# Patient Record
Sex: MALE | Race: BLACK OR AFRICAN AMERICAN | NOT HISPANIC OR LATINO | URBAN - METROPOLITAN AREA
[De-identification: names, ages, dates, MRNs, and addresses within clinical notes are randomized per-mention and may not be internally consistent; named-entity substitution may affect disease eponyms.]

---

## 2017-05-26 ENCOUNTER — INPATIENT (INPATIENT)
Facility: HOSPITAL | Age: 27
LOS: 4 days | Discharge: HOME HEALTH | End: 2017-05-31
Attending: HOSPITALIST | Admitting: HOSPITALIST
Payer: MEDICARE

## 2017-05-26 VITALS
OXYGEN SATURATION: 84 % | SYSTOLIC BLOOD PRESSURE: 120 MMHG | RESPIRATION RATE: 14 BRPM | TEMPERATURE: 98 F | DIASTOLIC BLOOD PRESSURE: 84 MMHG | HEART RATE: 84 BPM

## 2017-05-26 DIAGNOSIS — E87.2 ACIDOSIS: ICD-10-CM

## 2017-05-26 DIAGNOSIS — R73.9 HYPERGLYCEMIA, UNSPECIFIED: ICD-10-CM

## 2017-05-26 DIAGNOSIS — E13.10 OTHER SPECIFIED DIABETES MELLITUS WITH KETOACIDOSIS WITHOUT COMA: ICD-10-CM

## 2017-05-26 DIAGNOSIS — F25.0 SCHIZOAFFECTIVE DISORDER, BIPOLAR TYPE: ICD-10-CM

## 2017-05-26 DIAGNOSIS — Z29.9 ENCOUNTER FOR PROPHYLACTIC MEASURES, UNSPECIFIED: ICD-10-CM

## 2017-05-26 DIAGNOSIS — F31.9 BIPOLAR DISORDER, UNSPECIFIED: ICD-10-CM

## 2017-05-26 LAB
ALBUMIN SERPL ELPH-MCNC: 4.1 G/DL — SIGNIFICANT CHANGE UP (ref 3.3–5)
ALP SERPL-CCNC: 70 U/L — SIGNIFICANT CHANGE UP (ref 40–120)
ALT FLD-CCNC: 12 U/L — SIGNIFICANT CHANGE UP (ref 4–41)
APPEARANCE UR: CLEAR — SIGNIFICANT CHANGE UP
AST SERPL-CCNC: 15 U/L — SIGNIFICANT CHANGE UP (ref 4–40)
B-OH-BUTYR SERPL-SCNC: 0.9 MMOL/L — HIGH (ref 0–0.4)
BASE EXCESS BLDV CALC-SCNC: 1.4 MMOL/L — SIGNIFICANT CHANGE UP
BASOPHILS # BLD AUTO: 0.02 K/UL — SIGNIFICANT CHANGE UP (ref 0–0.2)
BASOPHILS NFR BLD AUTO: 0.2 % — SIGNIFICANT CHANGE UP (ref 0–2)
BILIRUB SERPL-MCNC: 0.2 MG/DL — SIGNIFICANT CHANGE UP (ref 0.2–1.2)
BILIRUB UR-MCNC: NEGATIVE — SIGNIFICANT CHANGE UP
BLOOD GAS VENOUS - CREATININE: 0.72 MG/DL — SIGNIFICANT CHANGE UP (ref 0.5–1.3)
BLOOD UR QL VISUAL: NEGATIVE — SIGNIFICANT CHANGE UP
BUN SERPL-MCNC: 13 MG/DL — SIGNIFICANT CHANGE UP (ref 7–23)
CALCIUM SERPL-MCNC: 9.6 MG/DL — SIGNIFICANT CHANGE UP (ref 8.4–10.5)
CHLORIDE BLDV-SCNC: 97 MMOL/L — SIGNIFICANT CHANGE UP (ref 96–108)
CHLORIDE SERPL-SCNC: 91 MMOL/L — LOW (ref 98–107)
CO2 SERPL-SCNC: 21 MMOL/L — LOW (ref 22–31)
COLOR SPEC: SIGNIFICANT CHANGE UP
CREAT SERPL-MCNC: 0.95 MG/DL — SIGNIFICANT CHANGE UP (ref 0.5–1.3)
EOSINOPHIL # BLD AUTO: 0.03 K/UL — SIGNIFICANT CHANGE UP (ref 0–0.5)
EOSINOPHIL NFR BLD AUTO: 0.3 % — SIGNIFICANT CHANGE UP (ref 0–6)
GAS PNL BLDV: 130 MMOL/L — LOW (ref 136–146)
GLUCOSE BLDV-MCNC: 513 — CRITICAL HIGH (ref 70–99)
GLUCOSE SERPL-MCNC: 585 MG/DL — CRITICAL HIGH (ref 70–99)
GLUCOSE UR-MCNC: >1000 — SIGNIFICANT CHANGE UP
HCO3 BLDV-SCNC: 25 MMOL/L — SIGNIFICANT CHANGE UP (ref 20–27)
HCT VFR BLD CALC: 44.2 % — SIGNIFICANT CHANGE UP (ref 39–50)
HCT VFR BLDV CALC: 47.1 % — SIGNIFICANT CHANGE UP (ref 39–51)
HGB BLD-MCNC: 15.3 G/DL — SIGNIFICANT CHANGE UP (ref 13–17)
HGB BLDV-MCNC: 15.4 G/DL — SIGNIFICANT CHANGE UP (ref 13–17)
IMM GRANULOCYTES NFR BLD AUTO: 0.4 % — SIGNIFICANT CHANGE UP (ref 0–1.5)
KETONES UR-MCNC: SIGNIFICANT CHANGE UP
LACTATE BLDV-MCNC: 1.6 MMOL/L — SIGNIFICANT CHANGE UP (ref 0.5–2)
LEUKOCYTE ESTERASE UR-ACNC: NEGATIVE — SIGNIFICANT CHANGE UP
LYMPHOCYTES # BLD AUTO: 3.92 K/UL — HIGH (ref 1–3.3)
LYMPHOCYTES # BLD AUTO: 38.1 % — SIGNIFICANT CHANGE UP (ref 13–44)
MCHC RBC-ENTMCNC: 28.8 PG — SIGNIFICANT CHANGE UP (ref 27–34)
MCHC RBC-ENTMCNC: 34.6 % — SIGNIFICANT CHANGE UP (ref 32–36)
MCV RBC AUTO: 83.1 FL — SIGNIFICANT CHANGE UP (ref 80–100)
MONOCYTES # BLD AUTO: 0.74 K/UL — SIGNIFICANT CHANGE UP (ref 0–0.9)
MONOCYTES NFR BLD AUTO: 7.2 % — SIGNIFICANT CHANGE UP (ref 2–14)
NEUTROPHILS # BLD AUTO: 5.55 K/UL — SIGNIFICANT CHANGE UP (ref 1.8–7.4)
NEUTROPHILS NFR BLD AUTO: 53.8 % — SIGNIFICANT CHANGE UP (ref 43–77)
NITRITE UR-MCNC: NEGATIVE — SIGNIFICANT CHANGE UP
PCO2 BLDV: 43 MMHG — SIGNIFICANT CHANGE UP (ref 41–51)
PH BLDV: 7.4 PH — SIGNIFICANT CHANGE UP (ref 7.32–7.43)
PH UR: 6 — SIGNIFICANT CHANGE UP (ref 4.6–8)
PLATELET # BLD AUTO: 223 K/UL — SIGNIFICANT CHANGE UP (ref 150–400)
PMV BLD: 11.1 FL — SIGNIFICANT CHANGE UP (ref 7–13)
PO2 BLDV: 45 MMHG — HIGH (ref 35–40)
POTASSIUM BLDV-SCNC: 4.4 MMOL/L — SIGNIFICANT CHANGE UP (ref 3.4–4.5)
POTASSIUM SERPL-MCNC: 5 MMOL/L — SIGNIFICANT CHANGE UP (ref 3.5–5.3)
POTASSIUM SERPL-SCNC: 5 MMOL/L — SIGNIFICANT CHANGE UP (ref 3.5–5.3)
PROT SERPL-MCNC: 7.4 G/DL — SIGNIFICANT CHANGE UP (ref 6–8.3)
PROT UR-MCNC: NEGATIVE — SIGNIFICANT CHANGE UP
RBC # BLD: 5.32 M/UL — SIGNIFICANT CHANGE UP (ref 4.2–5.8)
RBC # FLD: 12.8 % — SIGNIFICANT CHANGE UP (ref 10.3–14.5)
RBC CASTS # UR COMP ASSIST: SIGNIFICANT CHANGE UP (ref 0–?)
SAO2 % BLDV: 80.2 % — SIGNIFICANT CHANGE UP (ref 60–85)
SODIUM SERPL-SCNC: 130 MMOL/L — LOW (ref 135–145)
SP GR SPEC: 1.03 — HIGH (ref 1–1.03)
UROBILINOGEN FLD QL: NORMAL E.U. — SIGNIFICANT CHANGE UP (ref 0.1–0.2)
WBC # BLD: 10.3 K/UL — SIGNIFICANT CHANGE UP (ref 3.8–10.5)
WBC # FLD AUTO: 10.3 K/UL — SIGNIFICANT CHANGE UP (ref 3.8–10.5)

## 2017-05-26 PROCEDURE — 99223 1ST HOSP IP/OBS HIGH 75: CPT | Mod: GC

## 2017-05-26 RX ORDER — PROPRANOLOL HCL 160 MG
40 CAPSULE, EXTENDED RELEASE 24HR ORAL
Qty: 0 | Refills: 0 | Status: DISCONTINUED | OUTPATIENT
Start: 2017-05-26 | End: 2017-05-31

## 2017-05-26 RX ORDER — SODIUM CHLORIDE 9 MG/ML
1000 INJECTION INTRAMUSCULAR; INTRAVENOUS; SUBCUTANEOUS
Qty: 0 | Refills: 0 | Status: DISCONTINUED | OUTPATIENT
Start: 2017-05-26 | End: 2017-05-27

## 2017-05-26 RX ORDER — DEXTROSE 50 % IN WATER 50 %
12.5 SYRINGE (ML) INTRAVENOUS ONCE
Qty: 0 | Refills: 0 | Status: DISCONTINUED | OUTPATIENT
Start: 2017-05-26 | End: 2017-05-31

## 2017-05-26 RX ORDER — DIVALPROEX SODIUM 500 MG/1
500 TABLET, DELAYED RELEASE ORAL
Qty: 0 | Refills: 0 | Status: DISCONTINUED | OUTPATIENT
Start: 2017-05-26 | End: 2017-05-31

## 2017-05-26 RX ORDER — DEXTROSE 50 % IN WATER 50 %
1 SYRINGE (ML) INTRAVENOUS ONCE
Qty: 0 | Refills: 0 | Status: DISCONTINUED | OUTPATIENT
Start: 2017-05-26 | End: 2017-05-31

## 2017-05-26 RX ORDER — INSULIN LISPRO 100/ML
VIAL (ML) SUBCUTANEOUS AT BEDTIME
Qty: 0 | Refills: 0 | Status: DISCONTINUED | OUTPATIENT
Start: 2017-05-26 | End: 2017-05-31

## 2017-05-26 RX ORDER — INSULIN LISPRO 100/ML
6 VIAL (ML) SUBCUTANEOUS ONCE
Qty: 0 | Refills: 0 | Status: COMPLETED | OUTPATIENT
Start: 2017-05-26 | End: 2017-05-26

## 2017-05-26 RX ORDER — DEXTROSE 50 % IN WATER 50 %
25 SYRINGE (ML) INTRAVENOUS ONCE
Qty: 0 | Refills: 0 | Status: DISCONTINUED | OUTPATIENT
Start: 2017-05-26 | End: 2017-05-31

## 2017-05-26 RX ORDER — INSULIN LISPRO 100/ML
VIAL (ML) SUBCUTANEOUS
Qty: 0 | Refills: 0 | Status: DISCONTINUED | OUTPATIENT
Start: 2017-05-26 | End: 2017-05-31

## 2017-05-26 RX ORDER — BENZTROPINE MESYLATE 1 MG
1 TABLET ORAL
Qty: 0 | Refills: 0 | Status: DISCONTINUED | OUTPATIENT
Start: 2017-05-26 | End: 2017-05-31

## 2017-05-26 RX ORDER — INSULIN GLARGINE 100 [IU]/ML
5 INJECTION, SOLUTION SUBCUTANEOUS ONCE
Qty: 0 | Refills: 0 | Status: COMPLETED | OUTPATIENT
Start: 2017-05-26 | End: 2017-05-26

## 2017-05-26 RX ORDER — SODIUM CHLORIDE 9 MG/ML
2000 INJECTION INTRAMUSCULAR; INTRAVENOUS; SUBCUTANEOUS ONCE
Qty: 0 | Refills: 0 | Status: COMPLETED | OUTPATIENT
Start: 2017-05-26 | End: 2017-05-26

## 2017-05-26 RX ORDER — SODIUM CHLORIDE 9 MG/ML
1000 INJECTION, SOLUTION INTRAVENOUS
Qty: 0 | Refills: 0 | Status: DISCONTINUED | OUTPATIENT
Start: 2017-05-26 | End: 2017-05-31

## 2017-05-26 RX ORDER — HALOPERIDOL DECANOATE 100 MG/ML
20 INJECTION INTRAMUSCULAR AT BEDTIME
Qty: 0 | Refills: 0 | Status: DISCONTINUED | OUTPATIENT
Start: 2017-05-26 | End: 2017-05-31

## 2017-05-26 RX ORDER — GLUCAGON INJECTION, SOLUTION 0.5 MG/.1ML
1 INJECTION, SOLUTION SUBCUTANEOUS ONCE
Qty: 0 | Refills: 0 | Status: DISCONTINUED | OUTPATIENT
Start: 2017-05-26 | End: 2017-05-31

## 2017-05-26 RX ADMIN — Medication 6 UNIT(S): at 20:14

## 2017-05-26 RX ADMIN — SODIUM CHLORIDE 2000 MILLILITER(S): 9 INJECTION INTRAMUSCULAR; INTRAVENOUS; SUBCUTANEOUS at 18:46

## 2017-05-26 NOTE — H&P ADULT - NSHPLABSRESULTS_GEN_ALL_CORE
15.3   1030 )-----------( 223      ( 26 May 2017 17:55 )             44.2           130<L>  |  91<L>  |  13  ----------------------------<  585<HH>  5.0   |  21<L>  |  0.95    Ca    9.6      26 May 2017 17:55    TPro  7.4  /  Alb  4.1  /  TBili  0.2  /  DBili  x   /  AST  15  /  ALT  12  /  AlkPhos  70                Urinalysis Basic - ( 26 May 2017 19:12 )    Color: COLORL / Appearance: CLEAR / S.034 / pH: 6.0  Gluc: >1000 / Ketone: MODERATE  / Bili: NEGATIVE / Urobili: NORMAL E.U.   Blood: NEGATIVE / Protein: NEGATIVE / Nitrite: NEGATIVE   Leuk Esterase: NEGATIVE / RBC: 0-2 / WBC x   Sq Epi: x / Non Sq Epi: x / Bacteria: x            Lactate Trend            CAPILLARY BLOOD GLUCOSE  290 (26 May 2017 21:46) 15.3   1030 )-----------( 223      ( 26 May 2017 17:55 )             44.2           130<L>  |  91<L>  |  13  ----------------------------<  585<HH>  5.0   |  21<L>  |  0.95    Ca    9.6      26 May 2017 17:55    TPro  7.4  /  Alb  4.1  /  TBili  0.2  /  DBili  x   /  AST  15  /  ALT  12  /  AlkPhos  70            Urinalysis Basic - ( 26 May 2017 19:12 )    Color: COLORL / Appearance: CLEAR / S.034 / pH: 6.0  Gluc: >1000 / Ketone: MODERATE  / Bili: NEGATIVE / Urobili: NORMAL E.U.   Blood: NEGATIVE / Protein: NEGATIVE / Nitrite: NEGATIVE   Leuk Esterase: NEGATIVE / RBC: 0-2 / WBC x   Sq Epi: x / Non Sq Epi: x / Bacteria: x      CAPILLARY BLOOD GLUCOSE  290 (26 May 2017 21:46)    EKG nsr, 70bpm, qtc 421, no ST changes, global Tw flattening  - my reading

## 2017-05-26 NOTE — H&P ADULT - HISTORY OF PRESENT ILLNESS
27M PMH scoliosis, DM, autism sent to ED by his PMD (Dr. Shania Ha) for elevated blood glucose in office (461 per note). Pt endorses polyuria and polydipsia but denies any other symptoms. Father at bedside notes pt drinks a lot of sugary sodas. Unsure what medications he takes. Father calling other family members to find out    In the ED, VS: T: 98.7, HR: 70s-90s, BP: 133-121/ 84-77, RR: 18, SpO2: 100% on RA. Labs on admission notable for Na of 132 (140 corrected for hyperglycemia), K+ of 5.0 (moderately hemolyzed), chloride of 91, bicarb of 21 (anion gap of 18), serum glucose of 585, and beta-hydroxybutyrate of 0.9. UA reveals moderate ketones, negative LE/nitrites, >1000 glucose. In the ED, pt recieved  humalog 6U SQ x 1 and 2L NS bolus x 1. Last 3 POC blood FS reads of: 464 --> 358 --> 290. 28yo M w/ pmhx of scoliosis, DM type II, bipolar type I disorder, schizo-affective schizophrenia, and autism spectrum disorder with mild mental retardation referred to the ER by his PCP for elevated blood glucose in the outpatient office during a routine office visit (FS of 461 as per note). Of note, collateral information obtained from pts father and sister given as he is able to provide only a limited history. Per sister, pt had been having increased thirst and urination over the past few days, but otherwise was in his normal state of health. Pt was diagnosed with DM several years ago and has been on metformin since. He does not routinely check his fingersticks at home. Pt's sister is responsible for administering his medications and reports compliance. Sister, however, notes that pt's father often takes him out to eat unhealthy, non-diabetes complaints foods from fast-food restaurants.  Pt at present has no complaints. Denies any vision changes, no headaches, no peripheral neuropathy, no abdominal pain, nausea, vomiting or diarrhea. Currently denies any increased thirst sensation or increased frequency of urination. No urinary symptoms or URI complaints. No recent illnesses preceding the event. No recent medication changes.     In the ED, VS: T: 98.7, HR: 70s-90s, BP: 133-121/ 84-77, RR: 18, SpO2: 100% on RA. Labs on admission notable for Na of 132 (140 corrected for hyperglycemia), K+ of 5.0 (moderately hemolyzed), chloride of 91, bicarb of 21 (anion gap of 18), serum glucose of 585, and beta-hydroxybutyrate of 0.9. UA reveals moderate ketones, negative LE/nitrites, >1000 glucose. In the ED, pt received  Humalog 6U SQ x 1 and 2L NS bolus x 1. Last 3 POC blood FS reads of: 464 --> 358 --> 290. 26yo Male, ambulatory without any assistive devices,w/ hx of scoliosis, DM type II, bipolar type I disorder, schizo-affective schizophrenia, and autism spectrum disorder with mild mental retardation referred to the ER by his PCP for elevated blood glucose in the outpatient office during a routine office visit (FS of 461 as per note). Of note, collateral information obtained from pts father and sister given as he is able to provide only a limited history. Per sister, pt had been having increased thirst and urination over the past few days, but otherwise was in his normal state of health. Pt was diagnosed with DM several years ago and has been on metformin since. He does not routinely check his fingersticks at home. Pt's sister is responsible for administering his medications and reports compliance. Sister, however, notes that pt's father often takes him out to eat unhealthy, non-diabetes complaints foods from fast-food restaurants.  Pt at present has no complaints. Denies any vision changes, no headaches, no peripheral neuropathy, no abdominal pain, nausea, vomiting or diarrhea. Currently denies any increased thirst sensation or increased frequency of urination. No urinary symptoms or URI complaints. No recent illnesses preceding the event. No recent medication changes.     In the ED, VS: T: 98.7, HR: 70s-90s, BP: 133-121/ 84-77, RR: 18, SpO2: 100% on RA. Labs on admission notable for Na of 132 (140 corrected for hyperglycemia), K+ of 5.0 (moderately hemolyzed), chloride of 91, bicarb of 21 (anion gap of 18), serum glucose of 585, and beta-hydroxybutyrate of 0.9. UA reveals moderate ketones, negative LE/nitrites, >1000 glucose. In the ED, pt received  Humalog 6U SQ x 1 and 2L NS bolus x 1. Last 3 POC blood FS reads of: 464 --> 358 --> 290.

## 2017-05-26 NOTE — H&P ADULT - PROBLEM SELECTOR PLAN 3
- c/w home meds as prescribed - c/w home meds as prescribed  - screening EKG r/o QTc prolongation EKg c/w global Tw flattening;   Likely due to dka/hhs  Repeat EKG once metabolic disarrangement resolved, if persisting would consider Cardiology evaluation

## 2017-05-26 NOTE — ED PROVIDER NOTE - ATTENDING CONTRIBUTION TO CARE
att28 y/o m with h/o DM, noncompliant with medications, MR, autism, schizoaffective, presents for hyperglycemia, sent in by PCP. + polyuria/polydipsia. No fever, no cough, no vomiting, no diarrhea, no abd pain. No resp distress. Exam as above. NTTP abd. Plan for labs, r/u dka, IVF, insulin, ua, r/u infection and admission since pt has cognitive impairment and noncomplaint. Mild elevation of bhb, will admit.   I have personally performed a face to face bedside history and physical examination of this patient. I have discussed the history, examination, review of systems, assessment and plan of management with the resident. I have reviewed the electronic medical record and amended it to reflect my history, review of systems, physical exam, assessment and plan.

## 2017-05-26 NOTE — ED ADULT NURSE REASSESSMENT NOTE - NS ED NURSE REASSESS COMMENT FT1
pt AO x1-2, ambulatory, transfers to [facility] in stable condition. report given MYRANDA Song.

## 2017-05-26 NOTE — H&P ADULT - LYMPHATIC
posterior cervical L/posterior cervical R/anterior cervical R/supraclavicular R/anterior cervical L/supraclavicular L

## 2017-05-26 NOTE — H&P ADULT - NSHPREVIEWOFSYSTEMS_GEN_ALL_CORE
REVIEW OF SYSTEMS:    CONSTITUTIONAL: No weakness, fevers or chills  EYES/ENT: No visual changes;  No dysphagia  NECK: No pain or stiffness  RESPIRATORY: No cough, wheezing, hemoptysis; No shortness of breath  CARDIOVASCULAR: No chest pain or palpitations; No lower extremity edema  GASTROINTESTINAL: No abdominal or epigastric pain. No nausea, vomiting, or hematemesis; No diarrhea or constipation. No melena or hematochezia.  GENITOURINARY: No dysuria, frequency or hematuria  NEUROLOGICAL: No numbness or weakness  SKIN: No itching, burning, rashes, or lesions   All other review of systems is negative unless indicated above. CONSTITUTIONAL: No weakness, fevers or chills  EYES/ENT: No visual changes;  No dysphagia  NECK: No pain or stiffness  RESPIRATORY: No cough, wheezing, hemoptysis; No shortness of breath  CARDIOVASCULAR: No chest pain or palpitations; No lower extremity edema  GASTROINTESTINAL: No abdominal or epigastric pain. No nausea, vomiting, or hematemesis; No diarrhea or constipation. No melena or hematochezia.  GENITOURINARY: No dysuria, frequency or hematuria  NEUROLOGICAL: No numbness or weakness  SKIN: No itching, burning, rashes, or lesions   All other review of systems is negative unless indicated above.

## 2017-05-26 NOTE — H&P ADULT - PROBLEM SELECTOR PLAN 2
A/w metabolic acidosis with AG (18) on admission with present of ketonuria and beta-hydroxybutyrate on labs  - trend BMP q4 hrs to ensure gap closing  - administer insulin (moderage ISS coverage and lantus 10U x 1 now)  - IVF hydration   - if gap remains open, will need MICU consult for insulin gtt A/w mild metabolic acidosis with AG (18) on admission with present of ketonuria and beta-hydroxybutyrate on labs  - trend BMP q4 hrs to ensure gap closing  - administer insulin (moderage ISS coverage and lantus 10U x 1 now)  - IVF hydration   - if gap remains open, will need MICU consult for insulin gtt

## 2017-05-26 NOTE — H&P ADULT - PROBLEM SELECTOR PLAN 1
Improved FS s/p Humalog 6U and 2L NS bolus (>500 -->  464 --> 358 --> 290). Likely in setting of inadequate diabetes medication as well as nonadherence to diabetic diet.   - would place pt on moderate ISS coverage for now  - will administer Lantus 10U this evening for long acting coverage  - restart pt on basal / bolus insulin regimen tmw based on 24 hr requirements  - check HgbA1c in a.m.   - trend BMP q6h and betahydroxybutyrate to ensure gap closing   - c/w aggressive IVF hydration (NS @ 125cc/hr for 10 hours) Mixed picture of mild DKA with possible hyperosmolar hyperglycemic state.  Improved FS s/p Humalog 6U and 2L NS bolus (>500 -->  464 --> 358 --> 290). Likely in setting of inadequate diabetes medication as well as nonadherence to diabetic diet.   - would place pt on moderate ISS coverage for now  - will administer Lantus 10U this evening for long acting coverage  - restart pt on basal / bolus insulin regimen tmw based on 24 hr requirements  - check HgbA1c in a.m.   - trend BMP q6h and betahydroxybutyrate to ensure gap closing   - c/w aggressive IVF hydration (NS @ 125cc/hr for 10 hours). Mixed picture of mild DKA hyperosmolar hyperglycemic state.  Improved FS s/p Humalog 6U and 2L NS bolus (>500 -->  464 --> 358 --> 290). Likely in setting of inadequate diabetes medication as well as nonadherence to diabetic diet.   - would place pt on moderate ISS coverage for now  - will administer Lantus 5U this evening for long acting coverage (naive to insulin)  - restart pt on basal / bolus insulin regimen tmw based on 24 hr requirements  - check HgbA1c in a.m.   - trend BMP q6h and betahydroxybutyrate to ensure gap closing   - c/w aggressive IVF hydration (NS @ 125cc/hr)

## 2017-05-26 NOTE — ED PROVIDER NOTE - CARE PLAN
Principal Discharge DX:	Hyperglycemia Principal Discharge DX:	Uncontrolled type 2 diabetes mellitus with ketoacidosis without coma, without long-term current use of insulin  Secondary Diagnosis:	Mental retardation

## 2017-05-26 NOTE — ED PROVIDER NOTE - OBJECTIVE STATEMENT
27M PMH scoliosis, DM, autism sent to ED by his PMD (Dr. Shania Ha) for elevated blood glucose in office (461 per note). Pt endorses polyuria and polydipsia but denies any other symptoms. Father at bedside notes pt drinks a lot of sugary sodas. Unsure what medications he takes. Father calling other family members to find out.

## 2017-05-26 NOTE — ED ADULT NURSE NOTE - OBJECTIVE STATEMENT
Pt AO x1-2, ambulatory, was sent by his PMD for elevated blood glucose levels. At triage blood glucose 464mg/dl. Denies any pain or discomfort by now. Pending evaluation by provider. Blood obtained and sent. IV inserted. Right Ac 20G. Awaiting further study. Will continue to monitor.

## 2017-05-26 NOTE — H&P ADULT - NSHPSOCIALHISTORY_GEN_ALL_CORE
Pt has mild mental retardation and autism. He lives with his parents and sister, who help him with ADLs and medication administration. Pt ambulates unassisted without difficulty. He is unemployed. He denies any h/o cigarettes smoking, no EtOH or illicit drug use.

## 2017-05-26 NOTE — ED PROVIDER NOTE - PRINCIPAL DIAGNOSIS
Hyperglycemia Uncontrolled type 2 diabetes mellitus with ketoacidosis without coma, without long-term current use of insulin

## 2017-05-26 NOTE — H&P ADULT - NSHPPHYSICALEXAM_GEN_ALL_CORE
GENERAL: No acute distress, well-developed  HEAD:  Atraumatic, Normocephalic  ENT: EOMI, PERRLA, conjunctiva and sclera clear, Neck supple, No JVD, moist mucosa, no pharynageal erythema, no tonsillar enlargement or exudate  CHEST/LUNG: Clear to auscultation bilaterally; No wheeze, equal breath sounds bilaterally   HEART: Regular rate and rhythm; No murmurs, rubs, or gallops  ABDOMEN: Soft, Nontender, Nondistended; Bowel sounds present, no organomegaly  EXTREMITIES:  No clubbing, cyanosis, or edema  PSYCH: Nl behavior, nl affect  NEUROLOGY: AAOx3, non-focal, cranial nerves intact  SKIN: Normal color, No rashes or lesions GENERAL: No acute distress, well-developed, non-toxic appearing  HEAD:  Atraumatic, Normocephalic  ENT: EOMI, PERRLA, conjunctiva and sclera clear, Neck supple, No JVD, moist mucosa, no pharynageal erythema, no tonsillar enlargement or exudate  CHEST/LUNG: Clear to auscultation bilaterally; No wheeze, equal breath sounds bilaterally   HEART: Regular rate and rhythm; No murmurs, rubs, or gallops  ABDOMEN: Soft, Nontender, Nondistended; Bowel sounds present, no organomegaly  EXTREMITIES:  No clubbing, cyanosis, or edema  PSYCH: Nl behavior, nl affect  NEUROLOGY: AAOx3, non-focal, cranial nerves intact  SKIN: Normal color, No rashes or lesions

## 2017-05-26 NOTE — H&P ADULT - PROBLEM SELECTOR PLAN 4
- c/w home meds as prescribed - c/w home meds as prescribed  - Transfer from 5N to 6N initiated - c/w home meds as prescribed  - screening EKG, QTc wnl

## 2017-05-26 NOTE — H&P ADULT - PROBLEM SELECTOR PLAN 5
DVT ppx: low risk, ambulatory, no need for DVT ppx - c/w home meds as prescribed  - Transfer from 5N to 6N initiated

## 2017-05-26 NOTE — H&P ADULT - ASSESSMENT
26yo M w/ pmhx of scoliosis, DM type II, bipolar type I disorder, schizo-affective schizophrenia, and autism spectrum disorder with mild mental retardation referred by PCP for symptomatic hyperglycemia admitted with blood glucose of 585 complicated by anion gap metabolic acidosis. Blood sugar now improved s/p IV fluid hydration and insulin administration. 26yo Male w/ hx of scoliosis, DM type II, bipolar type I disorder, schizo-affective schizophrenia, and autism spectrum disorder with mild mental retardation a/w mild dka/HHS picture in the setting of ketonuria glycosuria; 28yo Male w/ hx of scoliosis, DM type II, bipolar type I disorder, schizo-affective schizophrenia, and autism spectrum disorder with mild mental retardation a/w mild dka/HHS picture in the setting of ketonuria glycosuria; Abnormal EKG c/w global Tw flattening;

## 2017-05-27 DIAGNOSIS — E66.9 OBESITY, UNSPECIFIED: ICD-10-CM

## 2017-05-27 DIAGNOSIS — F84.0 AUTISTIC DISORDER: ICD-10-CM

## 2017-05-27 DIAGNOSIS — E13.10 OTHER SPECIFIED DIABETES MELLITUS WITH KETOACIDOSIS WITHOUT COMA: ICD-10-CM

## 2017-05-27 DIAGNOSIS — R94.31 ABNORMAL ELECTROCARDIOGRAM [ECG] [EKG]: ICD-10-CM

## 2017-05-27 LAB
BASE EXCESS BLDV CALC-SCNC: 3.3 MMOL/L — SIGNIFICANT CHANGE UP
BLOOD GAS VENOUS - CREATININE: 0.73 MG/DL — SIGNIFICANT CHANGE UP (ref 0.5–1.3)
BUN SERPL-MCNC: 10 MG/DL — SIGNIFICANT CHANGE UP (ref 7–23)
BUN SERPL-MCNC: 5 MG/DL — LOW (ref 7–23)
CALCIUM SERPL-MCNC: 8.5 MG/DL — SIGNIFICANT CHANGE UP (ref 8.4–10.5)
CALCIUM SERPL-MCNC: 9.8 MG/DL — SIGNIFICANT CHANGE UP (ref 8.4–10.5)
CHLORIDE BLDV-SCNC: 101 MMOL/L — SIGNIFICANT CHANGE UP (ref 96–108)
CHLORIDE SERPL-SCNC: 94 MMOL/L — LOW (ref 98–107)
CHLORIDE SERPL-SCNC: 97 MMOL/L — LOW (ref 98–107)
CO2 SERPL-SCNC: 24 MMOL/L — SIGNIFICANT CHANGE UP (ref 22–31)
CO2 SERPL-SCNC: 26 MMOL/L — SIGNIFICANT CHANGE UP (ref 22–31)
CREAT SERPL-MCNC: 0.72 MG/DL — SIGNIFICANT CHANGE UP (ref 0.5–1.3)
CREAT SERPL-MCNC: 0.95 MG/DL — SIGNIFICANT CHANGE UP (ref 0.5–1.3)
GAS PNL BLDV: 133 MMOL/L — LOW (ref 136–146)
GLUCOSE BLDV-MCNC: 368 — HIGH (ref 70–99)
GLUCOSE SERPL-MCNC: 300 MG/DL — HIGH (ref 70–99)
GLUCOSE SERPL-MCNC: 363 MG/DL — HIGH (ref 70–99)
HBA1C BLD-MCNC: 16.7 % — HIGH (ref 4–5.6)
HCO3 BLDV-SCNC: 24 MMOL/L — SIGNIFICANT CHANGE UP (ref 20–27)
HCT VFR BLD CALC: 40.2 % — SIGNIFICANT CHANGE UP (ref 39–50)
HCT VFR BLDV CALC: 51.9 % — HIGH (ref 39–51)
HGB BLD-MCNC: 13.6 G/DL — SIGNIFICANT CHANGE UP (ref 13–17)
HGB BLDV-MCNC: 17 G/DL — SIGNIFICANT CHANGE UP (ref 13–17)
LACTATE BLDV-MCNC: 1.3 MMOL/L — SIGNIFICANT CHANGE UP (ref 0.5–2)
MAGNESIUM SERPL-MCNC: 1.6 MG/DL — SIGNIFICANT CHANGE UP (ref 1.6–2.6)
MAGNESIUM SERPL-MCNC: 1.9 MG/DL — SIGNIFICANT CHANGE UP (ref 1.6–2.6)
MCHC RBC-ENTMCNC: 28.3 PG — SIGNIFICANT CHANGE UP (ref 27–34)
MCHC RBC-ENTMCNC: 33.8 % — SIGNIFICANT CHANGE UP (ref 32–36)
MCV RBC AUTO: 83.6 FL — SIGNIFICANT CHANGE UP (ref 80–100)
PCO2 BLDV: 55 MMHG — HIGH (ref 41–51)
PH BLDV: 7.34 PH — SIGNIFICANT CHANGE UP (ref 7.32–7.43)
PHOSPHATE SERPL-MCNC: 3.1 MG/DL — SIGNIFICANT CHANGE UP (ref 2.5–4.5)
PHOSPHATE SERPL-MCNC: 3.8 MG/DL — SIGNIFICANT CHANGE UP (ref 2.5–4.5)
PLATELET # BLD AUTO: 187 K/UL — SIGNIFICANT CHANGE UP (ref 150–400)
PMV BLD: 10.8 FL — SIGNIFICANT CHANGE UP (ref 7–13)
PO2 BLDV: < 24 MMHG — LOW (ref 35–40)
POTASSIUM BLDV-SCNC: 4.5 MMOL/L — SIGNIFICANT CHANGE UP (ref 3.4–4.5)
POTASSIUM SERPL-MCNC: 4 MMOL/L — SIGNIFICANT CHANGE UP (ref 3.5–5.3)
POTASSIUM SERPL-MCNC: 5.2 MMOL/L — SIGNIFICANT CHANGE UP (ref 3.5–5.3)
POTASSIUM SERPL-SCNC: 4 MMOL/L — SIGNIFICANT CHANGE UP (ref 3.5–5.3)
POTASSIUM SERPL-SCNC: 5.2 MMOL/L — SIGNIFICANT CHANGE UP (ref 3.5–5.3)
RBC # BLD: 4.81 M/UL — SIGNIFICANT CHANGE UP (ref 4.2–5.8)
RBC # FLD: 12.6 % — SIGNIFICANT CHANGE UP (ref 10.3–14.5)
SAO2 % BLDV: 16.5 % — LOW (ref 60–85)
SODIUM SERPL-SCNC: 135 MMOL/L — SIGNIFICANT CHANGE UP (ref 135–145)
SODIUM SERPL-SCNC: 135 MMOL/L — SIGNIFICANT CHANGE UP (ref 135–145)
VALPROATE SERPL-MCNC: 69.7 UG/ML — SIGNIFICANT CHANGE UP (ref 50–100)
WBC # BLD: 9.34 K/UL — SIGNIFICANT CHANGE UP (ref 3.8–10.5)
WBC # FLD AUTO: 9.34 K/UL — SIGNIFICANT CHANGE UP (ref 3.8–10.5)

## 2017-05-27 PROCEDURE — 99222 1ST HOSP IP/OBS MODERATE 55: CPT

## 2017-05-27 PROCEDURE — 93010 ELECTROCARDIOGRAM REPORT: CPT

## 2017-05-27 PROCEDURE — 99233 SBSQ HOSP IP/OBS HIGH 50: CPT

## 2017-05-27 RX ORDER — INSULIN LISPRO 100/ML
6 VIAL (ML) SUBCUTANEOUS
Qty: 0 | Refills: 0 | Status: DISCONTINUED | OUTPATIENT
Start: 2017-05-27 | End: 2017-05-29

## 2017-05-27 RX ORDER — SODIUM CHLORIDE 9 MG/ML
1000 INJECTION INTRAMUSCULAR; INTRAVENOUS; SUBCUTANEOUS
Qty: 0 | Refills: 0 | Status: DISCONTINUED | OUTPATIENT
Start: 2017-05-27 | End: 2017-05-27

## 2017-05-27 RX ORDER — INSULIN GLARGINE 100 [IU]/ML
7 INJECTION, SOLUTION SUBCUTANEOUS ONCE
Qty: 0 | Refills: 0 | Status: COMPLETED | OUTPATIENT
Start: 2017-05-27 | End: 2017-05-27

## 2017-05-27 RX ORDER — INSULIN LISPRO 100/ML
4 VIAL (ML) SUBCUTANEOUS ONCE
Qty: 0 | Refills: 0 | Status: COMPLETED | OUTPATIENT
Start: 2017-05-27 | End: 2017-05-27

## 2017-05-27 RX ORDER — SODIUM CHLORIDE 9 MG/ML
1000 INJECTION INTRAMUSCULAR; INTRAVENOUS; SUBCUTANEOUS
Qty: 0 | Refills: 0 | Status: DISCONTINUED | OUTPATIENT
Start: 2017-05-27 | End: 2017-05-30

## 2017-05-27 RX ORDER — INSULIN LISPRO 100/ML
5 VIAL (ML) SUBCUTANEOUS
Qty: 0 | Refills: 0 | Status: DISCONTINUED | OUTPATIENT
Start: 2017-05-27 | End: 2017-05-27

## 2017-05-27 RX ORDER — INSULIN GLARGINE 100 [IU]/ML
16 INJECTION, SOLUTION SUBCUTANEOUS AT BEDTIME
Qty: 0 | Refills: 0 | Status: DISCONTINUED | OUTPATIENT
Start: 2017-05-27 | End: 2017-05-27

## 2017-05-27 RX ORDER — INSULIN LISPRO 100/ML
4 VIAL (ML) SUBCUTANEOUS
Qty: 0 | Refills: 0 | Status: DISCONTINUED | OUTPATIENT
Start: 2017-05-27 | End: 2017-05-27

## 2017-05-27 RX ORDER — INSULIN GLARGINE 100 [IU]/ML
16 INJECTION, SOLUTION SUBCUTANEOUS EVERY MORNING
Qty: 0 | Refills: 0 | Status: DISCONTINUED | OUTPATIENT
Start: 2017-05-28 | End: 2017-05-29

## 2017-05-27 RX ORDER — INSULIN GLARGINE 100 [IU]/ML
12 INJECTION, SOLUTION SUBCUTANEOUS AT BEDTIME
Qty: 0 | Refills: 0 | Status: DISCONTINUED | OUTPATIENT
Start: 2017-05-27 | End: 2017-05-27

## 2017-05-27 RX ADMIN — Medication: at 22:21

## 2017-05-27 RX ADMIN — HALOPERIDOL DECANOATE 20 MILLIGRAM(S): 100 INJECTION INTRAMUSCULAR at 21:37

## 2017-05-27 RX ADMIN — Medication 4 UNIT(S): at 09:21

## 2017-05-27 RX ADMIN — HALOPERIDOL DECANOATE 20 MILLIGRAM(S): 100 INJECTION INTRAMUSCULAR at 01:10

## 2017-05-27 RX ADMIN — Medication 4 UNIT(S): at 01:43

## 2017-05-27 RX ADMIN — SODIUM CHLORIDE 125 MILLILITER(S): 9 INJECTION INTRAMUSCULAR; INTRAVENOUS; SUBCUTANEOUS at 19:58

## 2017-05-27 RX ADMIN — Medication 8: at 11:34

## 2017-05-27 RX ADMIN — INSULIN GLARGINE 7 UNIT(S): 100 INJECTION, SOLUTION SUBCUTANEOUS at 04:58

## 2017-05-27 RX ADMIN — SODIUM CHLORIDE 125 MILLILITER(S): 9 INJECTION INTRAMUSCULAR; INTRAVENOUS; SUBCUTANEOUS at 01:40

## 2017-05-27 RX ADMIN — INSULIN GLARGINE 5 UNIT(S): 100 INJECTION, SOLUTION SUBCUTANEOUS at 01:10

## 2017-05-27 RX ADMIN — Medication 4: at 09:21

## 2017-05-27 RX ADMIN — SODIUM CHLORIDE 125 MILLILITER(S): 9 INJECTION INTRAMUSCULAR; INTRAVENOUS; SUBCUTANEOUS at 12:10

## 2017-05-27 RX ADMIN — Medication 1 MILLIGRAM(S): at 21:38

## 2017-05-27 RX ADMIN — Medication 2 MILLIGRAM(S): at 18:19

## 2017-05-27 RX ADMIN — DIVALPROEX SODIUM 500 MILLIGRAM(S): 500 TABLET, DELAYED RELEASE ORAL at 05:06

## 2017-05-27 RX ADMIN — Medication 1 MILLIGRAM(S): at 05:06

## 2017-05-27 RX ADMIN — Medication 40 MILLIGRAM(S): at 21:38

## 2017-05-27 RX ADMIN — DIVALPROEX SODIUM 500 MILLIGRAM(S): 500 TABLET, DELAYED RELEASE ORAL at 21:37

## 2017-05-27 RX ADMIN — Medication 5 UNIT(S): at 11:35

## 2017-05-27 NOTE — CONSULT NOTE ADULT - PROBLEM SELECTOR RECOMMENDATION 9
Discussed blood glucose goals and Hba1c target. Given Hba1c of 16.7%, will start basal bolus regimen. Patient will need diabetic teaching and nutrition consult. Will start Lantus 16 units QHS and Humalog 6 units QAC with moderate correction scale. Will follow closely and will need outpatient follow up. For discharge, will need insulin regimen if basal bolus regimen is too complicated may consider Novolog 70/30 mix twice daily regimen along with Metformin.  As outpatient, will need opthalmology evaluation and urine microalb/cr ratio. Recommend checking fasting lipid panel. Discussed carbohydrate consistent diet and exercise.

## 2017-05-27 NOTE — CHART NOTE - NSCHARTNOTEFT_GEN_A_CORE
Endocrine consult called for uncontrolled DM,  Hgb A1C 16.7.  As per verbal recommendations, pre-meal Humalog changed to 5units, Lantus dose to be determined.  Will follow up further recommendations.  Continue Consistent Carb Diet.  Will Continue to monitor.    --Maryanne Aldridge, FNP [id number]

## 2017-05-27 NOTE — CONSULT NOTE ADULT - SUBJECTIVE AND OBJECTIVE BOX
HPI:  26yo Male, ambulatory without any assistive devices,w/ hx of scoliosis, Type 2 DM, bipolar type I disorder, schizo-affective schizophrenia, and autism spectrum disorder with mild mental retardation referred to the ER by his PCP for elevated blood glucose in the outpatient office during a routine office visit (FS of 461 as per note). Of note, collateral information obtained from pts father and sister given as he is able to provide only a limited history. Per sister, pt had been having increased thirst and urination over the past few days, but otherwise was in his normal state of health. Pt was diagnosed with Type 2 DM several years ago and has been on Metformin since. He does not routinely check his fingersticks at home. Pt's sister is responsible for administering his medications and reports compliance. Sister, however, notes that pt's father often takes him out to eat unhealthy, non-diabetes complaints foods from fast-food restaurants.  Pt at present has no complaints. Denies any vision changes, no headaches, no peripheral neuropathy, no abdominal pain, vomiting or diarrhea. Currently denies any increased thirst sensation or increased frequency of urination. No urinary symptoms or URI complaints. No recent illnesses preceding the event. No recent medication changes.     In the ED, VS: T: 98.7, HR: 70s-90s, BP: 133-121/ 84-77, RR: 18, SpO2: 100% on RA. Labs on admission notable for Na of 132 (140 corrected for hyperglycemia), K+ of 5.0 (moderately hemolyzed), chloride of 91, bicarb of 21 (anion gap of 18), serum glucose of 585, and beta-hydroxybutyrate of 0.9. UA reveals moderate ketones, negative LE/nitrites, >1000 glucose. In the ED, pt received  Humalog 6U SQ x 1 and 2L NS bolus x 1. Last 3 POC blood FS reads of: 464 --> 358 --> 290. (26 May 2017 22:12)    In regards to Type 2DM, not certain when diagnosed. Noncompliant with diet and drinks regular soda and likes Chinese food. No exercise. At presents reports nausea but tolerating p.o. intake. No SOB or CP. Less polyuria and polydipsia now. Past due optho and not aware of retinopathy. No foot complaints including numbness or tingling.      PAST MEDICAL & SURGICAL HISTORY:  Schizo-affective schizophrenia  Bipolar 1 disorder  Obesity  Scoliosis  Mental retardation  No significant past surgical history      FAMILY HISTORY:  Father Type 2 DM      Social History: No smoking and no alcohol use    Outpatient Medications:  Metformin    MEDICATIONS  (STANDING):  propranolol 40milliGRAM(s) Oral two times a day  diVALproex DR 500milliGRAM(s) Oral two times a day  benztropine 1milliGRAM(s) Oral two times a day  haloperidol     Tablet 20milliGRAM(s) Oral at bedtime  insulin lispro (HumaLOG) corrective regimen sliding scale  SubCutaneous three times a day before meals  insulin lispro (HumaLOG) corrective regimen sliding scale  SubCutaneous at bedtime  dextrose 5%. 1000milliLiter(s) IV Continuous <Continuous>  dextrose 50% Injectable 12.5Gram(s) IV Push once  dextrose 50% Injectable 25Gram(s) IV Push once  dextrose 50% Injectable 25Gram(s) IV Push once  insulin lispro Injectable (HumaLOG) 5Unit(s) SubCutaneous three times a day with meals  sodium chloride 0.9%. 1000milliLiter(s) IV Continuous <Continuous>    MEDICATIONS  (PRN):  dextrose Gel 1Dose(s) Oral once PRN Blood Glucose LESS THAN 70 milliGRAM(s)/deciLiter  glucagon  Injectable 1milliGRAM(s) IntraMuscular once PRN Glucose <70 milliGRAM(s)/deciLiter      Allergies    No Known Allergies    Intolerances      Review of Systems:  Constitutional: No fever  Eyes: No blurry vision  Neuro: No tremors  HEENT: No pain  Cardiovascular: No chest pain, palpitations  Respiratory: No SOB, no cough  GI: Mild nausea and no vomiting, no abdominal pain  : No dysuria  Skin: no rash  Psych: no depression  Endocrine: no polyuria, polydipsia  Hem/lymph: no swelling      ALL OTHER SYSTEMS REVIEWED AND NEGATIVE        PHYSICAL EXAM:  VITALS: T(C): 36.6  T(F): 97.8, Max: 98.8 (05-26 @ 23:02)  HR: 76 (66 - 98)  BP: 112/70 (103/78 - 133/84)  RR:  (14 - 18)  SpO2:  (84% - 100%)  Wt(kg): --  GENERAL: NAD, well-groomed, well-developed  EYES: No proptosis, no lid lag, anicteric  HEENT:  Atraumatic, Normocephalic, moist mucous membranes  THYROID: Normal size, no palpable nodules  RESPIRATORY: Clear to auscultation bilaterally; No rales, rhonchi, wheezing  CARDIOVASCULAR: Regular rate and rhythm; No murmurs; no peripheral edema  GI: Soft, nontender, non distended, normal bowel sounds  SKIN: Dry, intact, No rashes or lesions  MUSCULOSKELETAL: Full range of motion, normal strength  NEURO: sensation intact, extraocular movements intact, no tremor  PSYCH: Alert and oriented x 3, normal mood  CUSHING'S SIGNS: no striae    CAPILLARY BLOOD GLUCOSE  303 (05-27 @ 11:18)  244 (05-27 @ 09:06)  223 (05-27 @ 08:38)  238 (05-27 @ 04:47)  300 (05-27 @ 00:55)  243 (05-26 @ 23:02)  290 (05-26 @ 21:46)  358 (05-26 @ 20:14)  464 (05-26 @ 17:14)                            13.6   9.34  )-----------( 187      ( 27 May 2017 10:20 )             40.2       05-27    135  |  97<L>  |  10  ----------------------------<  300<H>  4.0   |  24  |  0.72    EGFR if : 149  EGFR if non : 129    Ca    8.5      05-27  Mg     1.6     05-27  Phos  3.1     05-27    TPro  7.4  /  Alb  4.1  /  TBili  0.2  /  DBili  x   /  AST  15  /  ALT  12  /  AlkPhos  70  05-26      Thyroid Function Tests:      Hemoglobin A1C, Whole Blood: 16.7 % <H> [4.0 - 5.6] (05-27 @ 01:30)          Radiology:

## 2017-05-27 NOTE — PROGRESS NOTE ADULT - SUBJECTIVE AND OBJECTIVE BOX
Patient is a 27y old  Male who presents with mild dka due to uncontrolled DM      SUBJECTIVE / OVERNIGHT EVENTS:    MEDICATIONS  (STANDING):  propranolol 40milliGRAM(s) Oral two times a day  diVALproex DR 500milliGRAM(s) Oral two times a day  benztropine 1milliGRAM(s) Oral two times a day  haloperidol     Tablet 20milliGRAM(s) Oral at bedtime  insulin lispro (HumaLOG) corrective regimen sliding scale  SubCutaneous three times a day before meals  insulin lispro (HumaLOG) corrective regimen sliding scale  SubCutaneous at bedtime  dextrose 5%. 1000milliLiter(s) IV Continuous <Continuous>  dextrose 50% Injectable 12.5Gram(s) IV Push once  dextrose 50% Injectable 25Gram(s) IV Push once  dextrose 50% Injectable 25Gram(s) IV Push once  sodium chloride 0.9%. 1000milliLiter(s) IV Continuous <Continuous>  insulin lispro Injectable (HumaLOG) 4Unit(s) SubCutaneous three times a day with meals    MEDICATIONS  (PRN):  dextrose Gel 1Dose(s) Oral once PRN Blood Glucose LESS THAN 70 milliGRAM(s)/deciLiter  glucagon  Injectable 1milliGRAM(s) IntraMuscular once PRN Glucose <70 milliGRAM(s)/deciLiter      Vital Signs Last 24 Hrs  T(C): 36.7, Max: 37.1 ( @ 20:23)  HR: 66 (66 - 98)  BP: 111/73 (103/78 - 133/84)  RR: 18 (14 - 18)  SpO2: 99% (84% - 100%)  Wt(kg): --  CAPILLARY BLOOD GLUCOSE  244 (27 May 2017 09:06)  223 (27 May 2017 08:38)  238 (27 May 2017 04:47)  300 (27 May 2017 00:55)  243 (26 May 2017 23:02)  290 (26 May 2017 21:46)  358 (26 May 2017 20:14)  464 (26 May 2017 17:14)          PHYSICAL EXAM:  GENERAL: NAD, well-developed  HEAD:  Atraumatic, Normocephalic  EYES: EOMI, PERRLA, conjunctiva and sclera clear  CHEST/LUNG: Clear to auscultation bilaterally; No wheeze  HEART: Regular rate and rhythm; No murmurs, rubs, or gallops  ABDOMEN: Soft, Nontender, Nondistended; Bowel sounds present  EXTREMITIES:  2+ Peripheral Pulses, No clubbing, cyanosis, or edema  PSYCH: calm, coop  NEUROLOGY: non-focal      LABS:                        15.3   10.30 )-----------( 223      ( 26 May 2017 17:55 )             44.2         135  |  94<L>  |  5<L>  ----------------------------<  363<H>  5.2   |  26  |  0.95    Ca    9.8      27 May 2017 01:30  Phos  3.8       Mg     1.9         TPro  7.4  /  Alb  4.1  /  TBili  0.2  /  DBili  x   /  AST  15  /  ALT  12  /  AlkPhos  70            Urinalysis Basic - ( 26 May 2017 19:12 )    Color: COLORL / Appearance: CLEAR / S.034 / pH: 6.0  Gluc: >1000 / Ketone: MODERATE  / Bili: NEGATIVE / Urobili: NORMAL E.U.   Blood: NEGATIVE / Protein: NEGATIVE / Nitrite: NEGATIVE   Leuk Esterase: NEGATIVE / RBC: 0-2 / WBC x   Sq Epi: x / Non Sq Epi: x / Bacteria: x

## 2017-05-28 DIAGNOSIS — E11.9 TYPE 2 DIABETES MELLITUS WITHOUT COMPLICATIONS: ICD-10-CM

## 2017-05-28 LAB
BASOPHILS # BLD AUTO: 0.02 K/UL — SIGNIFICANT CHANGE UP (ref 0–0.2)
BASOPHILS NFR BLD AUTO: 0.2 % — SIGNIFICANT CHANGE UP (ref 0–2)
BUN SERPL-MCNC: 10 MG/DL — SIGNIFICANT CHANGE UP (ref 7–23)
CALCIUM SERPL-MCNC: 9.1 MG/DL — SIGNIFICANT CHANGE UP (ref 8.4–10.5)
CHLORIDE SERPL-SCNC: 100 MMOL/L — SIGNIFICANT CHANGE UP (ref 98–107)
CHOLEST SERPL-MCNC: 290 MG/DL — HIGH (ref 120–199)
CO2 SERPL-SCNC: 16 MMOL/L — LOW (ref 22–31)
CREAT SERPL-MCNC: 0.83 MG/DL — SIGNIFICANT CHANGE UP (ref 0.5–1.3)
EOSINOPHIL # BLD AUTO: 0.04 K/UL — SIGNIFICANT CHANGE UP (ref 0–0.5)
EOSINOPHIL NFR BLD AUTO: 0.4 % — SIGNIFICANT CHANGE UP (ref 0–6)
GLUCOSE SERPL-MCNC: 229 MG/DL — HIGH (ref 70–99)
HCT VFR BLD CALC: 45.2 % — SIGNIFICANT CHANGE UP (ref 39–50)
HDLC SERPL-MCNC: 32 MG/DL — LOW (ref 35–55)
HGB BLD-MCNC: 15.1 G/DL — SIGNIFICANT CHANGE UP (ref 13–17)
IMM GRANULOCYTES NFR BLD AUTO: 0.4 % — SIGNIFICANT CHANGE UP (ref 0–1.5)
LIPID PNL WITH DIRECT LDL SERPL: 228 MG/DL — SIGNIFICANT CHANGE UP
LYMPHOCYTES # BLD AUTO: 49.6 % — HIGH (ref 13–44)
LYMPHOCYTES # BLD AUTO: 5.53 K/UL — HIGH (ref 1–3.3)
MAGNESIUM SERPL-MCNC: 1.8 MG/DL — SIGNIFICANT CHANGE UP (ref 1.6–2.6)
MANUAL SMEAR VERIFICATION: SIGNIFICANT CHANGE UP
MCHC RBC-ENTMCNC: 28.3 PG — SIGNIFICANT CHANGE UP (ref 27–34)
MCHC RBC-ENTMCNC: 33.4 % — SIGNIFICANT CHANGE UP (ref 32–36)
MCV RBC AUTO: 84.6 FL — SIGNIFICANT CHANGE UP (ref 80–100)
MONOCYTES # BLD AUTO: 1.1 K/UL — HIGH (ref 0–0.9)
MONOCYTES NFR BLD AUTO: 9.9 % — SIGNIFICANT CHANGE UP (ref 2–14)
MORPHOLOGY BLD-IMP: SIGNIFICANT CHANGE UP
NEUTROPHILS # BLD AUTO: 4.43 K/UL — SIGNIFICANT CHANGE UP (ref 1.8–7.4)
NEUTROPHILS NFR BLD AUTO: 39.5 % — LOW (ref 43–77)
PHOSPHATE SERPL-MCNC: 3.9 MG/DL — SIGNIFICANT CHANGE UP (ref 2.5–4.5)
PLATELET # BLD AUTO: 197 K/UL — SIGNIFICANT CHANGE UP (ref 150–400)
PLATELET COUNT - ESTIMATE: NORMAL — SIGNIFICANT CHANGE UP
PMV BLD: 10.9 FL — SIGNIFICANT CHANGE UP (ref 7–13)
POTASSIUM SERPL-MCNC: 4.6 MMOL/L — SIGNIFICANT CHANGE UP (ref 3.5–5.3)
POTASSIUM SERPL-SCNC: 4.6 MMOL/L — SIGNIFICANT CHANGE UP (ref 3.5–5.3)
RBC # BLD: 5.34 M/UL — SIGNIFICANT CHANGE UP (ref 4.2–5.8)
RBC # FLD: 13.2 % — SIGNIFICANT CHANGE UP (ref 10.3–14.5)
SODIUM SERPL-SCNC: 135 MMOL/L — SIGNIFICANT CHANGE UP (ref 135–145)
TRIGL SERPL-MCNC: 202 MG/DL — HIGH (ref 10–149)
WBC # BLD: 11.16 K/UL — HIGH (ref 3.8–10.5)
WBC # FLD AUTO: 11.16 K/UL — HIGH (ref 3.8–10.5)

## 2017-05-28 PROCEDURE — 99233 SBSQ HOSP IP/OBS HIGH 50: CPT

## 2017-05-28 PROCEDURE — 99232 SBSQ HOSP IP/OBS MODERATE 35: CPT

## 2017-05-28 RX ORDER — ATORVASTATIN CALCIUM 80 MG/1
20 TABLET, FILM COATED ORAL AT BEDTIME
Qty: 0 | Refills: 0 | Status: DISCONTINUED | OUTPATIENT
Start: 2017-05-28 | End: 2017-05-30

## 2017-05-28 RX ADMIN — ATORVASTATIN CALCIUM 20 MILLIGRAM(S): 80 TABLET, FILM COATED ORAL at 21:41

## 2017-05-28 RX ADMIN — Medication 40 MILLIGRAM(S): at 17:06

## 2017-05-28 RX ADMIN — Medication 1 MILLIGRAM(S): at 06:23

## 2017-05-28 RX ADMIN — DIVALPROEX SODIUM 500 MILLIGRAM(S): 500 TABLET, DELAYED RELEASE ORAL at 17:06

## 2017-05-28 RX ADMIN — Medication: at 16:40

## 2017-05-28 RX ADMIN — HALOPERIDOL DECANOATE 20 MILLIGRAM(S): 100 INJECTION INTRAMUSCULAR at 21:41

## 2017-05-28 RX ADMIN — Medication 6 UNIT(S): at 12:21

## 2017-05-28 RX ADMIN — DIVALPROEX SODIUM 500 MILLIGRAM(S): 500 TABLET, DELAYED RELEASE ORAL at 06:23

## 2017-05-28 RX ADMIN — Medication 6 UNIT(S): at 16:40

## 2017-05-28 RX ADMIN — Medication 1 MILLIGRAM(S): at 17:06

## 2017-05-28 RX ADMIN — Medication: at 08:23

## 2017-05-28 RX ADMIN — Medication 40 MILLIGRAM(S): at 06:23

## 2017-05-28 RX ADMIN — Medication 6 UNIT(S): at 08:24

## 2017-05-28 RX ADMIN — SODIUM CHLORIDE 125 MILLILITER(S): 9 INJECTION INTRAMUSCULAR; INTRAVENOUS; SUBCUTANEOUS at 09:48

## 2017-05-28 RX ADMIN — Medication: at 12:20

## 2017-05-28 RX ADMIN — INSULIN GLARGINE 16 UNIT(S): 100 INJECTION, SOLUTION SUBCUTANEOUS at 08:57

## 2017-05-28 NOTE — PROGRESS NOTE ADULT - SUBJECTIVE AND OBJECTIVE BOX
Patient is a 27y old  Male who presents with mild DKA      SUBJECTIVE / OVERNIGHT EVENTS: denies sx    MEDICATIONS  (STANDING):  propranolol 40milliGRAM(s) Oral two times a day  diVALproex DR 500milliGRAM(s) Oral two times a day  benztropine 1milliGRAM(s) Oral two times a day  haloperidol     Tablet 20milliGRAM(s) Oral at bedtime  insulin lispro (HumaLOG) corrective regimen sliding scale  SubCutaneous three times a day before meals  insulin lispro (HumaLOG) corrective regimen sliding scale  SubCutaneous at bedtime  dextrose 5%. 1000milliLiter(s) IV Continuous <Continuous>  dextrose 50% Injectable 12.5Gram(s) IV Push once  dextrose 50% Injectable 25Gram(s) IV Push once  dextrose 50% Injectable 25Gram(s) IV Push once  sodium chloride 0.9%. 1000milliLiter(s) IV Continuous <Continuous>  insulin lispro Injectable (HumaLOG) 6Unit(s) SubCutaneous three times a day before meals  insulin glargine Injectable (LANTUS) 16Unit(s) SubCutaneous every morning    MEDICATIONS  (PRN):  dextrose Gel 1Dose(s) Oral once PRN Blood Glucose LESS THAN 70 milliGRAM(s)/deciLiter  glucagon  Injectable 1milliGRAM(s) IntraMuscular once PRN Glucose <70 milliGRAM(s)/deciLiter      Vital Signs Last 24 Hrs  T(C): 36.7, Max: 36.7 (05-27 @ 09:06)  HR: 69 (66 - 79)  BP: 108/81 (108/81 - 123/72)  RR: 18 (18 - 18)  SpO2: 98% (98% - 99%)  Wt(kg): --  CAPILLARY BLOOD GLUCOSE  231 (28 May 2017 07:45)  255 (27 May 2017 22:20)  102 (27 May 2017 17:03)  303 (27 May 2017 11:18)  244 (27 May 2017 09:06)  223 (27 May 2017 08:38)    PHYSICAL EXAM:  GENERAL: NAD, well-developed  HEAD:  Atraumatic, Normocephalic  EYES: EOMI, PERRLA, conjunctiva and sclera clear  CHEST/LUNG: non labored  HEART: Regular rate and rhythm; No murmurs, rubs, or gallops  ABDOMEN: Soft, Nontender, Nondistended; Bowel sounds present  EXTREMITIES:  No clubbing, cyanosis, or edema  PSYCH: calm, non wanting to interact with me  NEUROLOGY: grossly non-focal      LABS:                        15.1   11.16 )-----------( 197      ( 28 May 2017 06:50 )             45.2

## 2017-05-28 NOTE — DISCHARGE NOTE ADULT - CARE PROVIDERS DIRECT ADDRESSES
[email or web address]

## 2017-05-28 NOTE — PROGRESS NOTE ADULT - PROBLEM SELECTOR PLAN 1
cont IVF  aggressive gluc control  endo eval appreciated, basal/bolus started  DM education will need to include family as pt with intellectual limitations

## 2017-05-28 NOTE — DISCHARGE NOTE ADULT - PRINCIPAL DIAGNOSIS
Uncontrolled type 2 diabetes mellitus with ketoacidosis without coma, without long-term current use of insulin

## 2017-05-28 NOTE — DIETITIAN INITIAL EVALUATION ADULT. - NS AS NUTRI INTERV ED CONTENT3
RDN to follow-up with family Re: educations as schedule permits./Purpose of the nutrition education/Priority modifications/Survival information/Nutrition relationship to health/disease

## 2017-05-28 NOTE — DISCHARGE NOTE ADULT - MEDICATION SUMMARY - MEDICATIONS TO TAKE
I will START or STAY ON the medications listed below when I get home from the hospital:    glucometer  -- 1 unit(s) subcutaneous once a day  -- Indication: For Diabetes    test strips  -- 1 unit(s) subcutaneous 3 times a day  -- Indication: For Diabetes    BD pen mercedes needles  -- 1 unit(s) subcutaneous 2 times a day  -- Indication: For Diabetes    lancets  -- 1 unit(s) subcutaneous 3 times  -- Indication: For Diabetes    propranolol 40 mg oral tablet  -- 1 tab(s) by mouth 2 times a day  -- Indication: For Prophylactic measure    divalproex sodium 500 mg oral delayed release tablet  -- 1 tab(s) by mouth 2 times a day  -- Indication: For Mood stablization    NovoLOG Mix 70/30 FlexPen subcutaneous suspension  -- 32 unit(s) subcutaneouspre breakfast  16 units subcutaneous pre dinner  -- Check with your doctor before becoming pregnant.  Do not drink alcoholic beverages when taking this medication.  It is very important that you take or use this exactly as directed.  Do not skip doses or discontinue unless directed by your doctor.  Keep in refrigerator.  Do not freeze.  Obtain medical advice before taking any non-prescription drugs as some may affect the action of this medication.  This drug may impair the ability to drive or operate machinery.  Use care until you become familiar with its effects.    -- Indication: For Diabetes    atorvastatin 40 mg oral tablet  -- 1 tab(s) by mouth once a day (at bedtime)  -- Indication: For Hypercholesterolemia with LDL greater than 190 mg/dL    benztropine 1 mg oral tablet  -- 1 tab(s) by mouth 2 times a day  -- Indication: For Prophylactic measure    haloperidol 20 mg oral tablet  -- 1 tab(s) by mouth once a day (at bedtime)  -- Indication: For Schizo-affective schizophrenia

## 2017-05-28 NOTE — DISCHARGE NOTE ADULT - HOSPITAL COURSE
28yo Male, ambulatory without any assistive devices,w/ hx of scoliosis, DM type II, bipolar type I disorder, schizo-affective schizophrenia, and autism spectrum disorder with mild mental retardation referred to the ER by his PCP for elevated blood glucose in the outpatient office during a routine office visit (FS of 461 as per note)    Uncontrolled type 2 diabetes mellitus with ketoacidosis without coma, without long-term current use of insulin   - endo consulted  - started on basal bolus   - Hgb A1C 16.7  - Consistent Carb Diet  - trend BMP  - c/w aggressive IVF hydration  - serum chosesterol 290; Triglycerides 202; HDL 32: started Lipitor    Abnormal EKG.    - EKg c/w global Tw flattening;   Likely due to dka/hhs  Repeat EKG once metabolic disarrangement resolved, if persisting would consider Cardiology evaluation.     Schizo-affective schizophrenia.    - c/w home meds as prescribed  - screening EKG, QTc wnl.     Bipolar 1 disorder.    - c/w home meds as prescribed 26yo Male, ambulatory without any assistive devices,w/ hx of scoliosis, DM type II, bipolar type I disorder, schizo-affective schizophrenia, and autism spectrum disorder with mild mental retardation referred to the ER by his PCP for elevated blood glucose in the outpatient office during a routine office visit (FS of 461 as per note)    Uncontrolled type 2 diabetes mellitus with ketoacidosis without coma, without long-term current use of insulin   - endo consulted  - started on basal bolus   - Hgb A1C 16.7  - Consistent Carb Diet  - family education on diabetes management and insulin administration  - serum chosesterol 290; Triglycerides 202; HDL 32: started Lipitor      Schizo-affective schizophrenia.    - c/w home meds as prescribed  - behaviorally controlled during admission  - screening EKG, QTc wnl.     Bipolar 1 disorder.    - c/w home meds as prescribed 28yo Male, ambulatory without any assistive devices,w/ hx of scoliosis, DM type II, bipolar type I disorder, schizo-affective schizophrenia, and autism spectrum disorder with mild mental retardation referred to the ER by his PCP for elevated blood glucose in the outpatient office during a routine office visit (FS of 461 as per note). Found to be in mild DKA on admission.    Uncontrolled type 2 diabetes mellitus with ketoacidosis without coma, without long-term current use of insulin   - endo consulted  - started on basal bolus   - Hgb A1C 16.7  - Consistent Carb Diet  - family education on diabetes management and insulin administration  - serum chosesterol 290; Triglycerides 202; HDL 32: started Lipitor      Schizo-affective schizophrenia.    - c/w home meds as prescribed  - behaviorally controlled during admission  - screening EKG, QTc wnl.     Bipolar 1 disorder.    - c/w home meds as prescribed 28yo Male, ambulatory without any assistive devices,w/ hx of scoliosis, DM type II, bipolar type I disorder, schizo-affective schizophrenia, and autism spectrum disorder with mild mental retardation referred to the ER by his PCP for elevated blood glucose in the outpatient office during a routine office visit (FS of 461 as per note). Found to be in mild DKA on admission.    Uncontrolled type 2 diabetes mellitus with ketoacidosis without coma, without long-term current use of insulin   - endo consulted, DKA resolved on basal insulin  - discharged on Novolog 70/30 flex pen 32 units prebreakfast, 16 units pre dinner with advised family to check fingersticks before Breakfast, lunch, dinner.  - Hgb A1C 16.7  - Consistent Carb Diet  - family education done on diabetes management and insulin administration  - serum chosesterol 290; Triglycerides 202; HDL 32: started Lipitor 40mg      Schizo-affective schizophrenia.    - c/w home meds as prescribed  - behaviorally controlled during admission  - screening EKG, QTc wnl.     Bipolar 1 disorder.    - c/w home meds as prescribed

## 2017-05-28 NOTE — DISCHARGE NOTE ADULT - CARE PLAN
Principal Discharge DX:	Uncontrolled type 2 diabetes mellitus with ketoacidosis without coma, without long-term current use of insulin  Goal:	stable  Instructions for follow-up, activity and diet:	Follow up with PMD, Dr. Ha, within one week of discharge. Diet and activity as tolerated. Continue to monitor fingersticks and take insulin as directed  Secondary Diagnosis:	Schizo-affective schizophrenia  Goal:	stable  Instructions for follow-up, activity and diet:	Please continue taking all home medications as directed.  Secondary Diagnosis:	Bipolar 1 disorder  Secondary Diagnosis:	Mental retardation Principal Discharge DX:	Uncontrolled type 2 diabetes mellitus with ketoacidosis without coma, without long-term current use of insulin  Goal:	stable  Instructions for follow-up, activity and diet:	Follow up with PMD, Dr. Ha, within one week of discharge. Diet and activity as tolerated. Continue to monitor fingersticks and take insulin as directed.  Follow up with endocrine at clinic ([phone])---  Secondary Diagnosis:	Schizo-affective schizophrenia  Goal:	stable  Instructions for follow-up, activity and diet:	Please continue taking all home medications as directed.  Secondary Diagnosis:	Bipolar 1 disorder  Secondary Diagnosis:	Mental retardation Principal Discharge DX:	Uncontrolled type 2 diabetes mellitus with ketoacidosis without coma, without long-term current use of insulin  Goal:	stable - blood glucose target range 100-180  Instructions for follow-up, activity and diet:	Follow up with PMD, Dr. Ha, within one week of discharge. Diet and activity as tolerated. Continue to monitor fingersticks and take insulin as directed.  Follow up with endocrine at clinic ([phone])---  Secondary Diagnosis:	Schizo-affective schizophrenia  Goal:	stable  Instructions for follow-up, activity and diet:	Please continue taking all home medications as directed.  Secondary Diagnosis:	Bipolar 1 disorder  Secondary Diagnosis:	Mental retardation Principal Discharge DX:	Uncontrolled type 2 diabetes mellitus with ketoacidosis without coma, without long-term current use of insulin  Goal:	stable - blood glucose target range 100-180  Instructions for follow-up, activity and diet:	Follow up with PMD, Dr. Ha, within one week of discharge. Diet and activity as tolerated. Continue to monitor fingersticks and take insulin as directed.  Follow up with endocrine at clinic ([phone]) on---  Secondary Diagnosis:	Schizo-affective schizophrenia  Goal:	stable  Instructions for follow-up, activity and diet:	Please continue taking all home medications as directed.  Secondary Diagnosis:	Bipolar 1 disorder  Goal:	stable  Secondary Diagnosis:	Mental retardation  Goal:	stable Principal Discharge DX:	Uncontrolled type 2 diabetes mellitus with ketoacidosis without coma, without long-term current use of insulin  Goal:	stable - blood glucose target range 100-180  Instructions for follow-up, activity and diet:	Follow up with PMD, Dr. Ha, within one week of discharge. Diet and activity as tolerated. Continue to monitor fingersticks and take insulin as directed.  Follow up with endocrine at clinic ([phone]) on---  Secondary Diagnosis:	Schizo-affective schizophrenia  Goal:	stable  Instructions for follow-up, activity and diet:	Please continue taking all home medications as directed.  Secondary Diagnosis:	Bipolar 1 disorder  Goal:	stable  Secondary Diagnosis:	Mental retardation  Goal:	stable Principal Discharge DX:	Uncontrolled type 2 diabetes mellitus with ketoacidosis without coma, without long-term current use of insulin  Goal:	stable - blood glucose target range 100-180  Instructions for follow-up, activity and diet:	Follow up with PMD, Dr. Ha, within one week of discharge.   Hypoglycemia Management : Please check your fingersticks every morning or if you are not feeling well.  If your fingerstick is >300 mg/dl x 3 or more readings, please contact (provider) Dr. Guillen or Tamera Portillo NP. If your fingerstick low, <70 mg/dl and/or you have symptoms of very low blood sugar, FIRST drink 1/2 cup of apple juice, (or take 4 glucose tabs/tube of glucose gel) and recheck fingerstick in 15 minutes.  Repeat these steps until blood sugar is above 100 mg/dl, IF NECESSARY.  Then call provider listed above to discuss low blood sugar at (phone number) [phone]    -What to expect at follow appointment:  Please bring a log of your fingersticks and/or your glucometer to your appointment.  Your blood sugar tracking will help your diabetes team determine the best plan.    Insulin regimen:  Novolog 70/30 flex pen 32 units prebreakfast, 16 units pre dinner.  Check fingersticks before Breakfast, lunch, dinner.  Secondary Diagnosis:	Schizo-affective schizophrenia  Goal:	stable  Instructions for follow-up, activity and diet:	Please continue taking all home medications as directed.  Secondary Diagnosis:	Bipolar 1 disorder  Goal:	stable Principal Discharge DX:	Uncontrolled type 2 diabetes mellitus with ketoacidosis without coma, without long-term current use of insulin  Goal:	stable - blood glucose target range 100-180  Instructions for follow-up, activity and diet:	Follow up with PMD, Dr. Ha, within one week of discharge.   Hypoglycemia Management : Please check your fingersticks every morning or if you are not feeling well.  If your fingerstick is >300 mg/dl x 3 or more readings, please contact (provider) Dr. Guillen or Tamera Portillo NP. If your fingerstick low, <70 mg/dl and/or you have symptoms of very low blood sugar, FIRST drink 1/2 cup of apple juice, (or take 4 glucose tabs/tube of glucose gel) and recheck fingerstick in 15 minutes.  Repeat these steps until blood sugar is above 100 mg/dl, IF NECESSARY.  Then call provider listed above to discuss low blood sugar at (phone number) [phone]    -What to expect at follow appointment:  Please bring a log of your fingersticks and/or your glucometer to your appointment.  Your blood sugar tracking will help your diabetes team determine the best plan.    Insulin regimen:  Novolog 70/30 flex pen 32 units prebreakfast, 16 units pre dinner.  Check fingersticks before Breakfast, lunch, dinner.  Secondary Diagnosis:	Schizo-affective schizophrenia  Goal:	stable  Instructions for follow-up, activity and diet:	Please continue taking all home medications as directed.  Secondary Diagnosis:	Bipolar 1 disorder  Goal:	stable Principal Discharge DX:	Uncontrolled type 2 diabetes mellitus with ketoacidosis without coma, without long-term current use of insulin  Goal:	stable - blood glucose target range 100-180  Instructions for follow-up, activity and diet:	Follow up with PMD, Dr. Ha, within one week of discharge.   Hypoglycemia Management : Please check your fingersticks every morning or if you are not feeling well.  If your fingerstick is >300 mg/dl x 3 or more readings, please contact (provider) Dr. Guillen or Tamera Portillo NP. If your fingerstick low, <70 mg/dl and/or you have symptoms of very low blood sugar, FIRST drink 1/2 cup of apple juice, (or take 4 glucose tabs/tube of glucose gel) and recheck fingerstick in 15 minutes.  Repeat these steps until blood sugar is above 100 mg/dl, IF NECESSARY.  Then call provider listed above to discuss low blood sugar at (phone number) [phone]    -What to expect at follow appointment:  Please bring a log of your fingersticks and/or your glucometer to your appointment.  Your blood sugar tracking will help your diabetes team determine the best plan.    Insulin regimen:  Novolog 70/30 flex pen 32 units prebreakfast, 16 units pre dinner.  Check fingersticks before Breakfast, lunch, dinner.  Secondary Diagnosis:	Schizo-affective schizophrenia  Goal:	stable  Instructions for follow-up, activity and diet:	Please continue taking all home medications as directed.  Secondary Diagnosis:	Bipolar 1 disorder  Goal:	stable Principal Discharge DX:	Uncontrolled type 2 diabetes mellitus with ketoacidosis without coma, without long-term current use of insulin  Goal:	stable - blood glucose target range 100-180  Instructions for follow-up, activity and diet:	Follow up with PMD, Dr. Ha, within one week of discharge.   Hypoglycemia Management : Please check your fingersticks every morning or if you are not feeling well.  If your fingerstick is >300 mg/dl x 3 or more readings, please contact (provider) Dr. Guillen or Tamera Portillo NP. If your fingerstick low, <70 mg/dl and/or you have symptoms of very low blood sugar, FIRST drink 1/2 cup of apple juice, (or take 4 glucose tabs/tube of glucose gel) and recheck fingerstick in 15 minutes.  Repeat these steps until blood sugar is above 100 mg/dl, IF NECESSARY.  Then call provider listed above to discuss low blood sugar at (phone number) [phone]    -What to expect at follow appointment:  Please bring a log of your fingersticks and/or your glucometer to your appointment.  Your blood sugar tracking will help your diabetes team determine the best plan.    Insulin regimen:  Novolog 70/30 flex pen 32 units prebreakfast, 16 units pre dinner.  Check fingersticks before Breakfast, lunch, dinner.  Secondary Diagnosis:	Schizo-affective schizophrenia  Goal:	stable  Instructions for follow-up, activity and diet:	Please continue taking all home medications as directed.  Secondary Diagnosis:	Bipolar 1 disorder  Goal:	stable

## 2017-05-28 NOTE — DISCHARGE NOTE ADULT - HOME CARE AGENCY
[facility] [phone].  Nurse to visit on the day after discharge.  Other appropriate services to be arranged thereafter.

## 2017-05-28 NOTE — DISCHARGE NOTE ADULT - PATIENT PORTAL LINK FT
“You can access the [employer] Patient Portal, offered by [facility], by registering with the following website: [email or web address]”

## 2017-05-28 NOTE — DISCHARGE NOTE ADULT - MEDICATION SUMMARY - MEDICATIONS TO STOP TAKING
I will STOP taking the medications listed below when I get home from the hospital:    metFORMIN 500 mg oral tablet  -- 1 tab(s) by mouth 2 times a day (with meals)

## 2017-05-28 NOTE — DIETITIAN INITIAL EVALUATION ADULT. - OTHER INFO
Nutrition consult for assessment. Per RN, PO intake good fro breakfast >85 % intake.  No GI distress (nausea/vomiting/diarrhea/constipation.) No reports of chewing or swallowing difficulties.  Pt noted with HbA1c 16.7, diet education not feasible 2/2 cognition. Family not available at this time.  Written diet education materials left with Pt's bedside, RN aware.  RDN to follow up with family for diet education, given Pt's cognition.

## 2017-05-28 NOTE — DIETITIAN INITIAL EVALUATION ADULT. - NS AS NUTRI INTERV COLLABORAT3
Suggest outpatient follow up with an endocrinologist to ensure long-term DM diet comprehension and compliance.

## 2017-05-28 NOTE — DISCHARGE NOTE ADULT - PLAN OF CARE
stable Follow up with PMD, Dr. Ha, within one week of discharge. Diet and activity as tolerated. Continue to monitor fingersticks and take insulin as directed Please continue taking all home medications as directed. Follow up with PMD, Dr. Ha, within one week of discharge. Diet and activity as tolerated. Continue to monitor fingersticks and take insulin as directed.  Follow up with endocrine at clinic ([phone])--- stable - blood glucose target range 100-180 Follow up with PMD, Dr. Ha, within one week of discharge. Diet and activity as tolerated. Continue to monitor fingersticks and take insulin as directed.  Follow up with endocrine at clinic ([phone]) on--- Follow up with PMD, Dr. Ha, within one week of discharge.   Hypoglycemia Management : Please check your fingersticks every morning or if you are not feeling well.  If your fingerstick is >300 mg/dl x 3 or more readings, please contact (provider) Dr. Guillen or Tamera Portillo NP. If your fingerstick low, <70 mg/dl and/or you have symptoms of very low blood sugar, FIRST drink 1/2 cup of apple juice, (or take 4 glucose tabs/tube of glucose gel) and recheck fingerstick in 15 minutes.  Repeat these steps until blood sugar is above 100 mg/dl, IF NECESSARY.  Then call provider listed above to discuss low blood sugar at (phone number) [phone]    -What to expect at follow appointment:  Please bring a log of your fingersticks and/or your glucometer to your appointment.  Your blood sugar tracking will help your diabetes team determine the best plan.    Insulin regimen:  Novolog 70/30 flex pen 32 units prebreakfast, 16 units pre dinner.  Check fingersticks before Breakfast, lunch, dinner.

## 2017-05-28 NOTE — PROGRESS NOTE ADULT - PROBLEM SELECTOR PLAN 1
- Anticipate DC on Novolog 70/30 pens BID- doses TBD  - Increase Humalog 8 units SQ TID AC  -C/w Lantus 16 units SQ QAM for now.  not enough data to make adjustments.  -c/w correction scale ac/hs  -Sister will need education for novolog 70/30 insulin pen   -Father will need education on nutrtion  -DC plan- likely f/u at clinic. - Anticipate DC on Novolog 70/30 pens BID- doses TBD  - Increase Humalog 8 units SQ TID AC  -C/w Lantus 16 units SQ QAM for now.  not enough data to make adjustments.  -c/w correction scale ac/hs  - Check insulin antibody, LIANNA  -Sister will need education for novolog 70/30 insulin pen   -Father will need education on nutrtion  -DC plan- likely f/u at clinic.

## 2017-05-28 NOTE — DISCHARGE NOTE ADULT - CARE PROVIDER_API CALL
Shania Ha (DO), Family Medicine  [address] [place] [address]  Phone: [phone]  Fax: [phone] Shania Ha (DO), Family Medicine  [address] [place] [address]  Phone: [phone]  Fax: [phone]    Shreya Guillen (DO), [facility]  [address] [place] [address]  Phone: [phone]  Fax: [phone]

## 2017-05-28 NOTE — PROGRESS NOTE ADULT - SUBJECTIVE AND OBJECTIVE BOX
Chief Complaint: hyperglycemia    History: denies n/v, father brought patient chinese food for lunch.    tolerates PO.  ate brekafast and lunch    MEDICATIONS  (STANDING):  insulin lispro (HumaLOG) corrective regimen sliding scale  SubCutaneous three times a day before meals  insulin lispro (HumaLOG) corrective regimen sliding scale  SubCutaneous at bedtime  dextrose 5%. 1000milliLiter(s) IV Continuous <Continuous>  dextrose 50% Injectable 12.5Gram(s) IV Push once  dextrose 50% Injectable 25Gram(s) IV Push once  dextrose 50% Injectable 25Gram(s) IV Push once  sodium chloride 0.9%. 1000milliLiter(s) IV Continuous <Continuous>  insulin lispro Injectable (HumaLOG) 6Unit(s) SubCutaneous three times a day before meals  insulin glargine Injectable (LANTUS) 16Unit(s) SubCutaneous every morning  atorvastatin 20milliGRAM(s) Oral at bedtime    MEDICATIONS  (PRN):  dextrose Gel 1Dose(s) Oral once PRN Blood Glucose LESS THAN 70 milliGRAM(s)/deciLiter  glucagon  Injectable 1milliGRAM(s) IntraMuscular once PRN Glucose <70 milliGRAM(s)/deciLiter      Allergies    No Known Allergies    Intolerances      Review of Systems:  Constitutional: No fever  Eyes: No blurry vision  Neuro: No tremors  HEENT: No pain  Cardiovascular: No chest pain, palpitations  Respiratory: No SOB, no cough  GI: No nausea, vomiting, abdominal pain  : No dysuria  Skin: no rash  Psych: no depression  Endocrine: no polyuria, polydipsia  Hem/lymph: no swelling  Osteoporosis: no fractures    ALL OTHER SYSTEMS REVIEWED AND NEGATIVE    UNABLE TO OBTAIN    PHYSICAL EXAM:  VITALS: T(C): 36.7  T(F): 98.1, Max: 98.1 (05-27 @ 21:37)  HR: 69 (69 - 79)  BP: 108/81 (108/81 - 123/72)  RR:  (18 - 18)  SpO2:  (98% - 98%)  Wt(kg): --  GENERAL: NAD, well-groomed, well-developed  EYES: No proptosis, no lid lag, anicteric  HEENT:  Atraumatic, Normocephalic, moist mucous membranes  THYROID: Normal size, no palpable nodules  RESPIRATORY: Clear to auscultation bilaterally; No rales, rhonchi, wheezing, or rubs  CARDIOVASCULAR: Regular rate and rhythm; No murmurs; no peripheral edema  GI: Soft, nontender, non distended, normal bowel sounds  SKIN: Dry, intact, No rashes or lesions  MUSCULOSKELETAL: Full range of motion, normal strength  NEURO: sensation intact, extraocular movements intact, no tremor, normal reflexes  PSYCH: Alert and oriented x 3, normal affect, normal mood  CUSHING'S SIGNS: no striae    CAPILLARY BLOOD GLUCOSE  200 (05-28 @ 11:27)  231 (05-28 @ 07:45)  255 (05-27 @ 22:20)  102 (05-27 @ 17:03)  303 (05-27 @ 11:18)  244 (05-27 @ 09:06)  223 (05-27 @ 08:38)  238 (05-27 @ 04:47)  300 (05-27 @ 00:55)  243 (05-26 @ 23:02)  290 (05-26 @ 21:46)  358 (05-26 @ 20:14)  464 (05-26 @ 17:14)      05-28    135  |  100  |  10  ----------------------------<  229<H>  4.6   |  16<L>  |  0.83    EGFR if : 141  EGFR if non : 121    Ca    9.1      05-28  Mg     1.8     05-28  Phos  3.9     05-28    TPro  7.4  /  Alb  4.1  /  TBili  0.2  /  DBili  x   /  AST  15  /  ALT  12  /  AlkPhos  70  05-26          Thyroid Function Tests:      Hemoglobin A1C, Whole Blood: 16.7 % <H> [4.0 - 5.6] (05-27 @ 01:30)      Case discussed with:

## 2017-05-28 NOTE — DISCHARGE NOTE ADULT - ADDITIONAL INSTRUCTIONS
Follow up at Endocrine clinic - [phone] June 29th at 9:15am with Tamera oPrtillo NP  [address] [facility] [address]  [phone]

## 2017-05-29 DIAGNOSIS — E78.4 OTHER HYPERLIPIDEMIA: ICD-10-CM

## 2017-05-29 LAB
BASOPHILS # BLD AUTO: 0 K/UL — SIGNIFICANT CHANGE UP (ref 0–0.2)
BASOPHILS NFR BLD AUTO: 0 % — SIGNIFICANT CHANGE UP (ref 0–2)
BUN SERPL-MCNC: 6 MG/DL — LOW (ref 7–23)
CALCIUM SERPL-MCNC: 8.9 MG/DL — SIGNIFICANT CHANGE UP (ref 8.4–10.5)
CHLORIDE SERPL-SCNC: 101 MMOL/L — SIGNIFICANT CHANGE UP (ref 98–107)
CO2 SERPL-SCNC: 26 MMOL/L — SIGNIFICANT CHANGE UP (ref 22–31)
CREAT SERPL-MCNC: 0.75 MG/DL — SIGNIFICANT CHANGE UP (ref 0.5–1.3)
EOSINOPHIL # BLD AUTO: 0.04 K/UL — SIGNIFICANT CHANGE UP (ref 0–0.5)
EOSINOPHIL NFR BLD AUTO: 0.4 % — SIGNIFICANT CHANGE UP (ref 0–6)
GLUCOSE SERPL-MCNC: 233 MG/DL — HIGH (ref 70–99)
HCT VFR BLD CALC: 42.9 % — SIGNIFICANT CHANGE UP (ref 39–50)
HGB BLD-MCNC: 14.4 G/DL — SIGNIFICANT CHANGE UP (ref 13–17)
IMM GRANULOCYTES NFR BLD AUTO: 0.2 % — SIGNIFICANT CHANGE UP (ref 0–1.5)
LYMPHOCYTES # BLD AUTO: 5.11 K/UL — HIGH (ref 1–3.3)
LYMPHOCYTES # BLD AUTO: 55.7 % — HIGH (ref 13–44)
MAGNESIUM SERPL-MCNC: 1.5 MG/DL — LOW (ref 1.6–2.6)
MCHC RBC-ENTMCNC: 28.5 PG — SIGNIFICANT CHANGE UP (ref 27–34)
MCHC RBC-ENTMCNC: 33.6 % — SIGNIFICANT CHANGE UP (ref 32–36)
MCV RBC AUTO: 85 FL — SIGNIFICANT CHANGE UP (ref 80–100)
MONOCYTES # BLD AUTO: 0.7 K/UL — SIGNIFICANT CHANGE UP (ref 0–0.9)
MONOCYTES NFR BLD AUTO: 7.6 % — SIGNIFICANT CHANGE UP (ref 2–14)
NEUTROPHILS # BLD AUTO: 3.3 K/UL — SIGNIFICANT CHANGE UP (ref 1.8–7.4)
NEUTROPHILS NFR BLD AUTO: 36.1 % — LOW (ref 43–77)
PHOSPHATE SERPL-MCNC: 3.6 MG/DL — SIGNIFICANT CHANGE UP (ref 2.5–4.5)
PLATELET # BLD AUTO: 196 K/UL — SIGNIFICANT CHANGE UP (ref 150–400)
PMV BLD: 11.3 FL — SIGNIFICANT CHANGE UP (ref 7–13)
POTASSIUM SERPL-MCNC: 3.7 MMOL/L — SIGNIFICANT CHANGE UP (ref 3.5–5.3)
POTASSIUM SERPL-SCNC: 3.7 MMOL/L — SIGNIFICANT CHANGE UP (ref 3.5–5.3)
RBC # BLD: 5.05 M/UL — SIGNIFICANT CHANGE UP (ref 4.2–5.8)
RBC # FLD: 13.1 % — SIGNIFICANT CHANGE UP (ref 10.3–14.5)
SODIUM SERPL-SCNC: 140 MMOL/L — SIGNIFICANT CHANGE UP (ref 135–145)
WBC # BLD: 9.17 K/UL — SIGNIFICANT CHANGE UP (ref 3.8–10.5)
WBC # FLD AUTO: 9.17 K/UL — SIGNIFICANT CHANGE UP (ref 3.8–10.5)

## 2017-05-29 PROCEDURE — 99233 SBSQ HOSP IP/OBS HIGH 50: CPT

## 2017-05-29 PROCEDURE — 99232 SBSQ HOSP IP/OBS MODERATE 35: CPT

## 2017-05-29 RX ORDER — MAGNESIUM OXIDE 400 MG ORAL TABLET 241.3 MG
400 TABLET ORAL
Qty: 0 | Refills: 0 | Status: COMPLETED | OUTPATIENT
Start: 2017-05-29 | End: 2017-05-31

## 2017-05-29 RX ORDER — INSULIN GLARGINE 100 [IU]/ML
20 INJECTION, SOLUTION SUBCUTANEOUS EVERY MORNING
Qty: 0 | Refills: 0 | Status: DISCONTINUED | OUTPATIENT
Start: 2017-05-29 | End: 2017-05-31

## 2017-05-29 RX ORDER — INSULIN LISPRO 100/ML
8 VIAL (ML) SUBCUTANEOUS
Qty: 0 | Refills: 0 | Status: DISCONTINUED | OUTPATIENT
Start: 2017-05-29 | End: 2017-05-31

## 2017-05-29 RX ADMIN — Medication 2: at 22:24

## 2017-05-29 RX ADMIN — Medication 1 MILLIGRAM(S): at 17:18

## 2017-05-29 RX ADMIN — Medication 2: at 16:36

## 2017-05-29 RX ADMIN — Medication 40 MILLIGRAM(S): at 05:55

## 2017-05-29 RX ADMIN — Medication 1 MILLIGRAM(S): at 05:55

## 2017-05-29 RX ADMIN — Medication 8 UNIT(S): at 16:36

## 2017-05-29 RX ADMIN — Medication 40 MILLIGRAM(S): at 17:19

## 2017-05-29 RX ADMIN — DIVALPROEX SODIUM 500 MILLIGRAM(S): 500 TABLET, DELAYED RELEASE ORAL at 17:19

## 2017-05-29 RX ADMIN — ATORVASTATIN CALCIUM 20 MILLIGRAM(S): 80 TABLET, FILM COATED ORAL at 21:28

## 2017-05-29 RX ADMIN — MAGNESIUM OXIDE 400 MG ORAL TABLET 400 MILLIGRAM(S): 241.3 TABLET ORAL at 11:52

## 2017-05-29 RX ADMIN — Medication 6 UNIT(S): at 07:49

## 2017-05-29 RX ADMIN — HALOPERIDOL DECANOATE 20 MILLIGRAM(S): 100 INJECTION INTRAMUSCULAR at 21:28

## 2017-05-29 RX ADMIN — Medication 6 UNIT(S): at 11:50

## 2017-05-29 RX ADMIN — DIVALPROEX SODIUM 500 MILLIGRAM(S): 500 TABLET, DELAYED RELEASE ORAL at 05:55

## 2017-05-29 RX ADMIN — MAGNESIUM OXIDE 400 MG ORAL TABLET 400 MILLIGRAM(S): 241.3 TABLET ORAL at 16:37

## 2017-05-29 RX ADMIN — Medication 2: at 07:48

## 2017-05-29 RX ADMIN — INSULIN GLARGINE 16 UNIT(S): 100 INJECTION, SOLUTION SUBCUTANEOUS at 07:54

## 2017-05-29 RX ADMIN — Medication 4: at 11:50

## 2017-05-29 NOTE — PROGRESS NOTE ADULT - PROBLEM SELECTOR PLAN 1
cont IVF  aggressive gluc control  endo f/u with insulin coverage adjusted  DM education will need to include family as pt with intellectual limitations

## 2017-05-29 NOTE — PROGRESS NOTE ADULT - PROBLEM SELECTOR PLAN 1
Blood glucose target is 100 to 180. Will increase Lantus to 20 units QAM and Humalog to 8 units QAC with moderate correction scale. Plan for discharge will transition to Novolog 70/30 mix or Humalog 75/25 mix and doses to be determined. Will need diabetic teaching. Encouraged carbohydrate consistent diet and meeting with RD. Will follow. Blood glucose target is 100 to 180.   c/w Lantus to 20 units QAM and Humalog to 10 units QAC with moderate correction scale. Plan for discharge will transition to Novolog 70/30 mix or Humalog 75/25 mix and doses to be determined. Will need diabetic teaching. Encouraged carbohydrate consistent diet and meeting with RD. Will follow.  Diabetes Educators to follow up with Sister tomorrow morning for full education.  In meantime, nursing to teach when sister at bedside. Blood glucose target is 100 to 180.   c/w Lantus to 20 units QAM and Humalog to 8 units QAC with moderate correction scale. Plan for discharge will transition to Novolog 70/30 mix or Humalog 75/25 mix and doses to be determined. Will need diabetic teaching. Encouraged carbohydrate consistent diet and meeting with RD. Will follow.

## 2017-05-29 NOTE — PROGRESS NOTE ADULT - SUBJECTIVE AND OBJECTIVE BOX
Chief Complaint: Type 2 DM    History: 27 y.o. male with h/o uncontrolled Type 2 DM presented with mild DKA. Feeling good today. Tolerating p.o. intake. No nausea and no vomiting. No abdominal pain. No SOB or CP.    MEDICATIONS  (STANDING):  propranolol 40milliGRAM(s) Oral two times a day  diVALproex DR 500milliGRAM(s) Oral two times a day  benztropine 1milliGRAM(s) Oral two times a day  haloperidol     Tablet 20milliGRAM(s) Oral at bedtime  insulin lispro (HumaLOG) corrective regimen sliding scale  SubCutaneous three times a day before meals  insulin lispro (HumaLOG) corrective regimen sliding scale  SubCutaneous at bedtime  dextrose 5%. 1000milliLiter(s) IV Continuous <Continuous>  dextrose 50% Injectable 12.5Gram(s) IV Push once  dextrose 50% Injectable 25Gram(s) IV Push once  dextrose 50% Injectable 25Gram(s) IV Push once  sodium chloride 0.9%. 1000milliLiter(s) IV Continuous <Continuous>  insulin lispro Injectable (HumaLOG) 6Unit(s) SubCutaneous three times a day before meals  insulin glargine Injectable (LANTUS) 16Unit(s) SubCutaneous every morning  atorvastatin 20milliGRAM(s) Oral at bedtime  magnesium oxide 400milliGRAM(s) Oral three times a day with meals    MEDICATIONS  (PRN):  dextrose Gel 1Dose(s) Oral once PRN Blood Glucose LESS THAN 70 milliGRAM(s)/deciLiter  glucagon  Injectable 1milliGRAM(s) IntraMuscular once PRN Glucose <70 milliGRAM(s)/deciLiter      Allergies    No Known Allergies    Intolerances      Review of Systems:  Constitutional: No fever  HEENT: No pain  Cardiovascular: No chest pain, palpitations  Respiratory: No SOB, no cough  GI: No nausea, vomiting, abdominal pain  Endocrine: no polyuria, polydipsia        PHYSICAL EXAM:  VITALS: T(C): 36.7  T(F): 98, Max: 98 (05-29 @ 05:49)  HR: 64 (64 - 89)  BP: 113/79 (113/79 - 116/88)  RR:  (18 - 18)  SpO2:  (98% - 99%)  Wt(kg): --  GENERAL: NAD  RESPIRATORY: Clear to auscultation bilaterally; No rales, rhonchi, wheezing  CARDIOVASCULAR: Regular rate and rhythm; No murmurs; no peripheral edema  GI: Soft, nontender, non distended, normal bowel sounds    CAPILLARY BLOOD GLUCOSE  240 (05-29 @ 11:19)  188 (05-29 @ 07:44)  245 (05-28 @ 21:45)  171 (05-28 @ 16:25)  200 (05-28 @ 11:27)  231 (05-28 @ 07:45)  255 (05-27 @ 22:20)  102 (05-27 @ 17:03)  303 (05-27 @ 11:18)  244 (05-27 @ 09:06)  223 (05-27 @ 08:38)  238 (05-27 @ 04:47)  300 (05-27 @ 00:55)  243 (05-26 @ 23:02)  290 (05-26 @ 21:46)  358 (05-26 @ 20:14)  464 (05-26 @ 17:14)      05-29    140  |  101  |  6<L>  ----------------------------<  233<H>  3.7   |  26  |  0.75    EGFR if : 147  EGFR if non : 127    Ca    8.9      05-29  Mg     1.5     05-29  Phos  3.6     05-29    TPro  7.4  /  Alb  4.1  /  TBili  0.2  /  DBili  x   /  AST  15  /  ALT  12  /  AlkPhos  70  05-26          Thyroid Function Tests:      Hemoglobin A1C, Whole Blood: 16.7 % <H> [4.0 - 5.6] (05-27 @ 01:30) Chief Complaint: Type 2 DM    History: 27 y.o. male with h/o uncontrolled Type 2 DM presented with mild DKA. Feeling good today. Tolerating p.o. intake. No nausea and no vomiting. No abdominal pain. No SOB or CP.    MEDICATIONS  (STANDING):  propranolol 40milliGRAM(s) Oral two times a day  insulin lispro (HumaLOG) corrective regimen sliding scale  SubCutaneous three times a day before meals  insulin lispro (HumaLOG) corrective regimen sliding scale  SubCutaneous at bedtime  dextrose 5%. 1000milliLiter(s) IV Continuous <Continuous>  dextrose 50% Injectable 12.5Gram(s) IV Push once  dextrose 50% Injectable 25Gram(s) IV Push once  dextrose 50% Injectable 25Gram(s) IV Push once  sodium chloride 0.9%. 1000milliLiter(s) IV Continuous <Continuous>  insulin lispro Injectable (HumaLOG) 6Unit(s) SubCutaneous three times a day before meals  insulin glargine Injectable (LANTUS) 16Unit(s) SubCutaneous every morning  atorvastatin 20milliGRAM(s) Oral at bedtime    MEDICATIONS  (PRN):  dextrose Gel 1Dose(s) Oral once PRN Blood Glucose LESS THAN 70 milliGRAM(s)/deciLiter  glucagon  Injectable 1milliGRAM(s) IntraMuscular once PRN Glucose <70 milliGRAM(s)/deciLiter      Allergies    No Known Allergies    Intolerances      Review of Systems:  Constitutional: No fever  HEENT: No pain  Cardiovascular: No chest pain, palpitations  Respiratory: No SOB, no cough  GI: No nausea, vomiting, abdominal pain  Endocrine: no polyuria, polydipsia        PHYSICAL EXAM:  VITALS: T(C): 36.7  T(F): 98, Max: 98 (05-29 @ 05:49)  HR: 64 (64 - 89)  BP: 113/79 (113/79 - 116/88)  RR:  (18 - 18)  SpO2:  (98% - 99%)  Wt(kg): --  GENERAL: NAD  RESPIRATORY: Clear to auscultation bilaterally; No rales, rhonchi, wheezing  CARDIOVASCULAR: Regular rate and rhythm; No murmurs; no peripheral edema  GI: Soft, nontender, non distended, normal bowel sounds    CAPILLARY BLOOD GLUCOSE  202 (30 May 2017 11:30)  172 (30 May 2017 07:46)  240 (05-29 @ 11:19)  188 (05-29 @ 07:44)  245 (05-28 @ 21:45)  171 (05-28 @ 16:25)  200 (05-28 @ 11:27)  231 (05-28 @ 07:45)  255 (05-27 @ 22:20)  102 (05-27 @ 17:03)  303 (05-27 @ 11:18)  244 (05-27 @ 09:06)  223 (05-27 @ 08:38)  238 (05-27 @ 04:47)  300 (05-27 @ 00:55)  243 (05-26 @ 23:02)  290 (05-26 @ 21:46)  358 (05-26 @ 20:14)  464 (05-26 @ 17:14)      05-29    140  |  101  |  6<L>  ----------------------------<  233<H>  3.7   |  26  |  0.75    EGFR if : 147  EGFR if non : 127    Ca    8.9      05-29  Mg     1.5     05-29  Phos  3.6     05-29    TPro  7.4  /  Alb  4.1  /  TBili  0.2  /  DBili  x   /  AST  15  /  ALT  12  /  AlkPhos  70  05-26          Thyroid Function Tests:      Hemoglobin A1C, Whole Blood: 16.7 % <H> [4.0 - 5.6] (05-27 @ 01:30) Chief Complaint: Type 2 DM    History: 27 y.o. male with h/o uncontrolled Type 2 DM presented with mild DKA. Feeling good today. Tolerating p.o. intake. No nausea and no vomiting. No abdominal pain. No SOB or CP.    MEDICATIONS  (STANDING):  propranolol 40milliGRAM(s) Oral two times a day  insulin lispro (HumaLOG) corrective regimen sliding scale  SubCutaneous three times a day before meals  insulin lispro (HumaLOG) corrective regimen sliding scale  SubCutaneous at bedtime  dextrose 5%. 1000milliLiter(s) IV Continuous <Continuous>  dextrose 50% Injectable 12.5Gram(s) IV Push once  dextrose 50% Injectable 25Gram(s) IV Push once  dextrose 50% Injectable 25Gram(s) IV Push once  sodium chloride 0.9%. 1000milliLiter(s) IV Continuous <Continuous>  insulin lispro Injectable (HumaLOG) 6Unit(s) SubCutaneous three times a day before meals  insulin glargine Injectable (LANTUS) 16Unit(s) SubCutaneous every morning  atorvastatin 20milliGRAM(s) Oral at bedtime    MEDICATIONS  (PRN):  dextrose Gel 1Dose(s) Oral once PRN Blood Glucose LESS THAN 70 milliGRAM(s)/deciLiter  glucagon  Injectable 1milliGRAM(s) IntraMuscular once PRN Glucose <70 milliGRAM(s)/deciLiter      Allergies    No Known Allergies    Intolerances      Review of Systems:  Constitutional: No fever  HEENT: No pain  Cardiovascular: No chest pain, palpitations  Respiratory: No SOB, no cough  GI: No nausea, vomiting, abdominal pain  Endocrine: no polyuria, polydipsia        PHYSICAL EXAM:  VITALS: T(C): 36.7  T(F): 98, Max: 98 (05-29 @ 05:49)  HR: 64 (64 - 89)  BP: 113/79 (113/79 - 116/88)  RR:  (18 - 18)  SpO2:  (98% - 99%)  Wt(kg): --  GENERAL: NAD  RESPIRATORY: Clear to auscultation bilaterally; No rales, rhonchi, wheezing  CARDIOVASCULAR: Regular rate and rhythm; No murmurs; no peripheral edema  GI: Soft, nontender, non distended, normal bowel sounds    CAPILLARY BLOOD GLUCOSE  240 (05-29 @ 11:19)  188 (05-29 @ 07:44)  245 (05-28 @ 21:45)  171 (05-28 @ 16:25)  200 (05-28 @ 11:27)  231 (05-28 @ 07:45)  255 (05-27 @ 22:20)  102 (05-27 @ 17:03)  303 (05-27 @ 11:18)  244 (05-27 @ 09:06)  223 (05-27 @ 08:38)  238 (05-27 @ 04:47)  300 (05-27 @ 00:55)  243 (05-26 @ 23:02)  290 (05-26 @ 21:46)  358 (05-26 @ 20:14)  464 (05-26 @ 17:14)      05-29    140  |  101  |  6<L>  ----------------------------<  233<H>  3.7   |  26  |  0.75    EGFR if : 147  EGFR if non : 127    Ca    8.9      05-29  Mg     1.5     05-29  Phos  3.6     05-29    TPro  7.4  /  Alb  4.1  /  TBili  0.2  /  DBili  x   /  AST  15  /  ALT  12  /  AlkPhos  70  05-26          Thyroid Function Tests:      Hemoglobin A1C, Whole Blood: 16.7 % <H> [4.0 - 5.6] (05-27 @ 01:30)

## 2017-05-29 NOTE — PROGRESS NOTE ADULT - SUBJECTIVE AND OBJECTIVE BOX
Patient is a 27y old  Male who presents with mild DKA    SUBJECTIVE / OVERNIGHT EVENTS: heather PO, no abd pain, pleasant; hungry    MEDICATIONS  (STANDING):  propranolol 40milliGRAM(s) Oral two times a day  diVALproex DR 500milliGRAM(s) Oral two times a day  benztropine 1milliGRAM(s) Oral two times a day  haloperidol     Tablet 20milliGRAM(s) Oral at bedtime  insulin lispro (HumaLOG) corrective regimen sliding scale  SubCutaneous three times a day before meals  insulin lispro (HumaLOG) corrective regimen sliding scale  SubCutaneous at bedtime  dextrose 5%. 1000milliLiter(s) IV Continuous <Continuous>  dextrose 50% Injectable 12.5Gram(s) IV Push once  dextrose 50% Injectable 25Gram(s) IV Push once  dextrose 50% Injectable 25Gram(s) IV Push once  sodium chloride 0.9%. 1000milliLiter(s) IV Continuous <Continuous>  insulin lispro Injectable (HumaLOG) 6Unit(s) SubCutaneous three times a day before meals  insulin glargine Injectable (LANTUS) 16Unit(s) SubCutaneous every morning  atorvastatin 20milliGRAM(s) Oral at bedtime    MEDICATIONS  (PRN):  dextrose Gel 1Dose(s) Oral once PRN Blood Glucose LESS THAN 70 milliGRAM(s)/deciLiter  glucagon  Injectable 1milliGRAM(s) IntraMuscular once PRN Glucose <70 milliGRAM(s)/deciLiter      Vital Signs Last 24 Hrs  T(C): 36.7, Max: 36.7 (05-29 @ 05:49)  HR: 64 (64 - 89)  BP: 113/79 (113/79 - 116/88)  RR: 18 (18 - 18)  SpO2: 99% (98% - 99%)  Wt(kg): --  CAPILLARY BLOOD GLUCOSE  245 (28 May 2017 21:45)  171 (28 May 2017 16:25)  200 (28 May 2017 11:27)  231 (28 May 2017 07:45)      PHYSICAL EXAM:  GENERAL: NAD, well-developed  HEAD:  Atraumatic, Normocephalic  EYES: EOMI, PERRLA, conjunctiva and sclera clear  CHEST/LUNG: b/l AE  HEART: Reg  ABDOMEN: Soft, Nontender, Nondistended; +BS  EXTREMITIES: No clubbing, cyanosis, or edema  PSYCH: pleasant, coop  NEUROLOGY: non-focal

## 2017-05-30 DIAGNOSIS — E78.00 PURE HYPERCHOLESTEROLEMIA, UNSPECIFIED: ICD-10-CM

## 2017-05-30 LAB
BASOPHILS # BLD AUTO: 0.01 K/UL — SIGNIFICANT CHANGE UP (ref 0–0.2)
BASOPHILS NFR BLD AUTO: 0.1 % — SIGNIFICANT CHANGE UP (ref 0–2)
BUN SERPL-MCNC: 8 MG/DL — SIGNIFICANT CHANGE UP (ref 7–23)
CALCIUM SERPL-MCNC: 8.6 MG/DL — SIGNIFICANT CHANGE UP (ref 8.4–10.5)
CHLORIDE SERPL-SCNC: 104 MMOL/L — SIGNIFICANT CHANGE UP (ref 98–107)
CO2 SERPL-SCNC: 24 MMOL/L — SIGNIFICANT CHANGE UP (ref 22–31)
CREAT SERPL-MCNC: 0.62 MG/DL — SIGNIFICANT CHANGE UP (ref 0.5–1.3)
EOSINOPHIL # BLD AUTO: 0.04 K/UL — SIGNIFICANT CHANGE UP (ref 0–0.5)
EOSINOPHIL NFR BLD AUTO: 0.5 % — SIGNIFICANT CHANGE UP (ref 0–6)
GLUCOSE SERPL-MCNC: 201 MG/DL — HIGH (ref 70–99)
HCT VFR BLD CALC: 40.2 % — SIGNIFICANT CHANGE UP (ref 39–50)
HGB BLD-MCNC: 13.6 G/DL — SIGNIFICANT CHANGE UP (ref 13–17)
IMM GRANULOCYTES NFR BLD AUTO: 0.2 % — SIGNIFICANT CHANGE UP (ref 0–1.5)
LYMPHOCYTES # BLD AUTO: 4.52 K/UL — HIGH (ref 1–3.3)
LYMPHOCYTES # BLD AUTO: 55.5 % — HIGH (ref 13–44)
MAGNESIUM SERPL-MCNC: 1.6 MG/DL — SIGNIFICANT CHANGE UP (ref 1.6–2.6)
MCHC RBC-ENTMCNC: 28.1 PG — SIGNIFICANT CHANGE UP (ref 27–34)
MCHC RBC-ENTMCNC: 33.8 % — SIGNIFICANT CHANGE UP (ref 32–36)
MCV RBC AUTO: 83.1 FL — SIGNIFICANT CHANGE UP (ref 80–100)
MONOCYTES # BLD AUTO: 0.81 K/UL — SIGNIFICANT CHANGE UP (ref 0–0.9)
MONOCYTES NFR BLD AUTO: 10 % — SIGNIFICANT CHANGE UP (ref 2–14)
NEUTROPHILS # BLD AUTO: 2.74 K/UL — SIGNIFICANT CHANGE UP (ref 1.8–7.4)
NEUTROPHILS NFR BLD AUTO: 33.7 % — LOW (ref 43–77)
PHOSPHATE SERPL-MCNC: 4.2 MG/DL — SIGNIFICANT CHANGE UP (ref 2.5–4.5)
PLATELET # BLD AUTO: 183 K/UL — SIGNIFICANT CHANGE UP (ref 150–400)
PMV BLD: 11.1 FL — SIGNIFICANT CHANGE UP (ref 7–13)
POTASSIUM SERPL-MCNC: 3.8 MMOL/L — SIGNIFICANT CHANGE UP (ref 3.5–5.3)
POTASSIUM SERPL-SCNC: 3.8 MMOL/L — SIGNIFICANT CHANGE UP (ref 3.5–5.3)
RBC # BLD: 4.84 M/UL — SIGNIFICANT CHANGE UP (ref 4.2–5.8)
RBC # FLD: 12.8 % — SIGNIFICANT CHANGE UP (ref 10.3–14.5)
SODIUM SERPL-SCNC: 140 MMOL/L — SIGNIFICANT CHANGE UP (ref 135–145)
WBC # BLD: 8.14 K/UL — SIGNIFICANT CHANGE UP (ref 3.8–10.5)
WBC # FLD AUTO: 8.14 K/UL — SIGNIFICANT CHANGE UP (ref 3.8–10.5)

## 2017-05-30 PROCEDURE — 99232 SBSQ HOSP IP/OBS MODERATE 35: CPT

## 2017-05-30 PROCEDURE — 99233 SBSQ HOSP IP/OBS HIGH 50: CPT

## 2017-05-30 RX ORDER — ATORVASTATIN CALCIUM 80 MG/1
40 TABLET, FILM COATED ORAL AT BEDTIME
Qty: 0 | Refills: 0 | Status: DISCONTINUED | OUTPATIENT
Start: 2017-05-30 | End: 2017-05-31

## 2017-05-30 RX ADMIN — HALOPERIDOL DECANOATE 20 MILLIGRAM(S): 100 INJECTION INTRAMUSCULAR at 21:16

## 2017-05-30 RX ADMIN — Medication 40 MILLIGRAM(S): at 06:33

## 2017-05-30 RX ADMIN — Medication 8 UNIT(S): at 08:10

## 2017-05-30 RX ADMIN — Medication 1 MILLIGRAM(S): at 06:35

## 2017-05-30 RX ADMIN — DIVALPROEX SODIUM 500 MILLIGRAM(S): 500 TABLET, DELAYED RELEASE ORAL at 06:35

## 2017-05-30 RX ADMIN — Medication 2: at 11:40

## 2017-05-30 RX ADMIN — MAGNESIUM OXIDE 400 MG ORAL TABLET 400 MILLIGRAM(S): 241.3 TABLET ORAL at 11:41

## 2017-05-30 RX ADMIN — Medication 2: at 08:10

## 2017-05-30 RX ADMIN — Medication 8 UNIT(S): at 17:49

## 2017-05-30 RX ADMIN — Medication 1 MILLIGRAM(S): at 16:54

## 2017-05-30 RX ADMIN — Medication 40 MILLIGRAM(S): at 16:54

## 2017-05-30 RX ADMIN — ATORVASTATIN CALCIUM 40 MILLIGRAM(S): 80 TABLET, FILM COATED ORAL at 21:16

## 2017-05-30 RX ADMIN — DIVALPROEX SODIUM 500 MILLIGRAM(S): 500 TABLET, DELAYED RELEASE ORAL at 16:54

## 2017-05-30 RX ADMIN — INSULIN GLARGINE 20 UNIT(S): 100 INJECTION, SOLUTION SUBCUTANEOUS at 08:10

## 2017-05-30 RX ADMIN — Medication 8 UNIT(S): at 11:41

## 2017-05-30 RX ADMIN — MAGNESIUM OXIDE 400 MG ORAL TABLET 400 MILLIGRAM(S): 241.3 TABLET ORAL at 08:10

## 2017-05-30 RX ADMIN — MAGNESIUM OXIDE 400 MG ORAL TABLET 400 MILLIGRAM(S): 241.3 TABLET ORAL at 16:55

## 2017-05-30 NOTE — PROGRESS NOTE ADULT - SUBJECTIVE AND OBJECTIVE BOX
Chief Complaint: uncontrolled dm2    History:  27 y.o. male with h/o uncontrolled Type 2 DM presented with mild DKA. Feeling good today. Tolerating p.o. intake. No nausea and no vomiting. No abdominal pain. No SOB or CP.    MEDICATIONS  (STANDING):  propranolol 40milliGRAM(s) Oral two times a day  diVALproex DR 500milliGRAM(s) Oral two times a day  benztropine 1milliGRAM(s) Oral two times a day  haloperidol     Tablet 20milliGRAM(s) Oral at bedtime  insulin lispro (HumaLOG) corrective regimen sliding scale  SubCutaneous three times a day before meals  insulin lispro (HumaLOG) corrective regimen sliding scale  SubCutaneous at bedtime  dextrose 5%. 1000milliLiter(s) IV Continuous <Continuous>  dextrose 50% Injectable 12.5Gram(s) IV Push once  dextrose 50% Injectable 25Gram(s) IV Push once  dextrose 50% Injectable 25Gram(s) IV Push once  magnesium oxide 400milliGRAM(s) Oral three times a day with meals  insulin lispro Injectable (HumaLOG) 8Unit(s) SubCutaneous three times a day before meals  insulin glargine Injectable (LANTUS) 20Unit(s) SubCutaneous every morning  atorvastatin 40milliGRAM(s) Oral at bedtime    MEDICATIONS  (PRN):  dextrose Gel 1Dose(s) Oral once PRN Blood Glucose LESS THAN 70 milliGRAM(s)/deciLiter  glucagon  Injectable 1milliGRAM(s) IntraMuscular once PRN Glucose <70 milliGRAM(s)/deciLiter      Allergies    No Known Allergies    Intolerances      Review of Systems:  Constitutional: No fever  Eyes: No blurry vision  Neuro: No tremors  HEENT: No pain  Cardiovascular: No chest pain, palpitations  Respiratory: No SOB, no cough  GI: No nausea, vomiting, abdominal pain  : No dysuria  Skin: no rash  Psych: no depression  Endocrine: no polyuria, polydipsia  Hem/lymph: no swelling  Osteoporosis: no fractures    ALL OTHER SYSTEMS REVIEWED AND NEGATIVE        PHYSICAL EXAM:  VITALS: T(C): 36.6  T(F): 97.8, Max: 97.9 (05-29 @ 19:08)  HR: 63 (63 - 70)  BP: 111/69 (107/60 - 125/84)  RR:  (18 - 18)  SpO2:  (100% - 100%)  Wt(kg): --  GENERAL: NAD, well-groomed, well-developed  EYES: No proptosis, no lid lag, anicteric  HEENT:  Atraumatic, Normocephalic, moist mucous membranes  THYROID: Normal size, no palpable nodules  RESPIRATORY: Clear to auscultation bilaterally; No rales, rhonchi, wheezing, or rubs  CARDIOVASCULAR: Regular rate and rhythm; No murmurs; no peripheral edema  GI: Soft, nontender, non distended, normal bowel sounds  SKIN: Dry, intact, No rashes or lesions  MUSCULOSKELETAL: Full range of motion, normal strength  NEURO: sensation intact, extraocular movements intact, no tremor, normal reflexes  PSYCH: Alert and oriented x 3, normal affect, normal mood  CUSHING'S SIGNS: no striae    CAPILLARY BLOOD GLUCOSE  202 (05-30 @ 11:30)  172 (05-30 @ 07:46)  285 (05-29 @ 22:00)  152 (05-29 @ 16:17)  240 (05-29 @ 11:19)  188 (05-29 @ 07:44)  245 (05-28 @ 21:45)  171 (05-28 @ 16:25)  200 (05-28 @ 11:27)  231 (05-28 @ 07:45)  255 (05-27 @ 22:20)  102 (05-27 @ 17:03)      05-30    140  |  104  |  8   ----------------------------<  201<H>  3.8   |  24  |  0.62    EGFR if : 159  EGFR if non : 137    Ca    8.6      05-30  Mg     1.6     05-30  Phos  4.2     05-30            Thyroid Function Tests:      Hemoglobin A1C, Whole Blood: 16.7 % <H> [4.0 - 5.6] (05-27 @ 01:30)

## 2017-05-30 NOTE — PROGRESS NOTE ADULT - PROBLEM SELECTOR PLAN 1
resolved  DC IVF  Patient is insulin naiive, Continue Lantus and premeal insulin, FS better controlled  Endocrine to provide DC recs for insulin  DM education to be given tmw at 11am to patient's sister who gives patient his meds

## 2017-05-30 NOTE — PROGRESS NOTE ADULT - PROBLEM SELECTOR PLAN 1
Blood glucose target is 100 to 180.   c/w Lantus to 20 units QAM and Humalog to 10 units QAC with moderate correction scale. Plan for discharge will transition to Novolog 70/30 mix or Humalog 75/25 mix and doses to be determined. Will need diabetic teaching. Encouraged carbohydrate consistent diet and meeting with RD. Will follow.

## 2017-05-30 NOTE — PROGRESS NOTE ADULT - SUBJECTIVE AND OBJECTIVE BOX
Patient is a 27y old  Male who presents with a chief complaint of hyperglycemia (28 May 2017 17:26)      SUBJECTIVE / OVERNIGHT EVENTS: No acute events overnight. No complaints from patient. No chest pain, SOB, N/V/D. ROS may be unreliable given underlying autism.    MEDICATIONS  (STANDING):  propranolol 40milliGRAM(s) Oral two times a day  diVALproex DR 500milliGRAM(s) Oral two times a day  benztropine 1milliGRAM(s) Oral two times a day  haloperidol     Tablet 20milliGRAM(s) Oral at bedtime  insulin lispro (HumaLOG) corrective regimen sliding scale  SubCutaneous three times a day before meals  insulin lispro (HumaLOG) corrective regimen sliding scale  SubCutaneous at bedtime  dextrose 5%. 1000milliLiter(s) IV Continuous <Continuous>  dextrose 50% Injectable 12.5Gram(s) IV Push once  dextrose 50% Injectable 25Gram(s) IV Push once  dextrose 50% Injectable 25Gram(s) IV Push once  magnesium oxide 400milliGRAM(s) Oral three times a day with meals  insulin lispro Injectable (HumaLOG) 8Unit(s) SubCutaneous three times a day before meals  insulin glargine Injectable (LANTUS) 20Unit(s) SubCutaneous every morning  atorvastatin 40milliGRAM(s) Oral at bedtime    MEDICATIONS  (PRN):  dextrose Gel 1Dose(s) Oral once PRN Blood Glucose LESS THAN 70 milliGRAM(s)/deciLiter  glucagon  Injectable 1milliGRAM(s) IntraMuscular once PRN Glucose <70 milliGRAM(s)/deciLiter      Vital Signs Last 24 Hrs  T(C): 36.6, Max: 36.6 (05-29 @ 19:08)  HR: 63 (63 - 70)  BP: 111/69 (107/60 - 125/84)  RR: 18 (18 - 18)  SpO2: 100% (100% - 100%)  Wt(kg): --  CAPILLARY BLOOD GLUCOSE  202 (30 May 2017 11:30)  172 (30 May 2017 07:46)  285 (29 May 2017 22:00)  152 (29 May 2017 16:17)    I&O's Summary    I & Os for current day (as of 30 May 2017 12:08)  =============================================  IN: 1250 ml / OUT: 0 ml / NET: 1250 ml      PHYSICAL EXAM:  no apparent distress, on room air  s1/s2  lungs clear b/l  abd soft, NT/ND  no LE edema  awake, alert, answers Qs, knows he is in hospital but unsure why  slow speech, autistic      LABS:                        13.6   8.14  )-----------( 183      ( 30 May 2017 06:02 )             40.2     05-30    140  |  104  |  8   ----------------------------<  201<H>  3.8   |  24  |  0.62    Ca    8.6      30 May 2017 06:02  Phos  4.2     05-30  Mg     1.6     05-30

## 2017-05-31 VITALS
HEART RATE: 71 BPM | DIASTOLIC BLOOD PRESSURE: 70 MMHG | OXYGEN SATURATION: 100 % | RESPIRATION RATE: 18 BRPM | SYSTOLIC BLOOD PRESSURE: 115 MMHG | TEMPERATURE: 97 F

## 2017-05-31 DIAGNOSIS — E11.65 TYPE 2 DIABETES MELLITUS WITH HYPERGLYCEMIA: ICD-10-CM

## 2017-05-31 PROCEDURE — 99232 SBSQ HOSP IP/OBS MODERATE 35: CPT

## 2017-05-31 PROCEDURE — 99239 HOSP IP/OBS DSCHRG MGMT >30: CPT

## 2017-05-31 PROCEDURE — 12345: CPT | Mod: NC

## 2017-05-31 RX ORDER — INSULIN ASPART 100 [IU]/ML
32 INJECTION, SUSPENSION SUBCUTANEOUS
Qty: 5 | Refills: 1 | OUTPATIENT
Start: 2017-05-31 | End: 2017-07-29

## 2017-05-31 RX ORDER — INSULIN LISPRO 100/ML
10 VIAL (ML) SUBCUTANEOUS
Qty: 0 | Refills: 0 | Status: DISCONTINUED | OUTPATIENT
Start: 2017-05-31 | End: 2017-05-31

## 2017-05-31 RX ORDER — ATORVASTATIN CALCIUM 80 MG/1
1 TABLET, FILM COATED ORAL
Qty: 30 | Refills: 0 | OUTPATIENT
Start: 2017-05-31 | End: 2017-06-30

## 2017-05-31 RX ADMIN — Medication 8 UNIT(S): at 08:15

## 2017-05-31 RX ADMIN — Medication 1 MILLIGRAM(S): at 07:47

## 2017-05-31 RX ADMIN — MAGNESIUM OXIDE 400 MG ORAL TABLET 400 MILLIGRAM(S): 241.3 TABLET ORAL at 08:15

## 2017-05-31 RX ADMIN — INSULIN GLARGINE 20 UNIT(S): 100 INJECTION, SOLUTION SUBCUTANEOUS at 08:15

## 2017-05-31 RX ADMIN — Medication 2: at 08:15

## 2017-05-31 RX ADMIN — DIVALPROEX SODIUM 500 MILLIGRAM(S): 500 TABLET, DELAYED RELEASE ORAL at 05:15

## 2017-05-31 NOTE — PROGRESS NOTE ADULT - PROBLEM SELECTOR PROBLEM 2
Hypercholesterolemia with LDL greater than 190 mg/dL
Hypercholesterolemia with LDL greater than 190 mg/dL
Other hyperlipidemia
Other hyperlipidemia
Schizo-affective schizophrenia
Schizo-affective schizophrenia
Other hyperlipidemia
Schizo-affective schizophrenia

## 2017-05-31 NOTE — PROGRESS NOTE ADULT - PROBLEM SELECTOR PLAN 1
Blood glucose target is 100 to 180.   c/w Lantus to 20 units QAM and Humalog to 10 units QAC with moderate correction scale.

## 2017-05-31 NOTE — PROGRESS NOTE ADULT - ATTENDING COMMENTS
Dispo: DC home today after diabetic education provided to patient's sister and father  Discharge time: 35 minutes spent coordinating plan and discharge  PCP: Dr. Ha from [facility]: [phone]
Dispo: DC home tmw after diabetic education provided to patient's sister and father  Discussed and updated plan with patient's sister, Sophie Ha. Per Sophie, patient's father has a brain tumor and does not always comprehend the severity of his son's diabetes and feeds him inappropriate foods. I encouraged Sophie to bring her father in with her tmw for diabetes education. Sophie states she has no other form of help at home.   Will refer to  for possible home care services  PCP: Dr. Ha from [facility]: [phone]

## 2017-05-31 NOTE — PROGRESS NOTE ADULT - ASSESSMENT
26 yo male with ho schizoaff d/o, DM, autism spectrum d/o a/w mild DKA due to uncontrolled DM
27 y.o. male with h/o uncontrolled Type 2 DM and hyperlipidemia presents with mild DKA
27 y.o. male with h/o uncontrolled Type 2 DM and hyperlipidemia presents with mild DKA    DISCHARGE PLAN: *********Advise Patient on Discharge note:  - Hypoglycemia Management : Please check your fingersticks every morning or if you are not feeling well.  If your fingerstick is >300 mg/dl x 3 or more readings, please contact (provider) Dr. Guillen or Tamera Portillo NP. If your fingerstick low, <70 mg/dl and/or you have symptoms of very low blood sugar, FIRST drink 1/2 cup of apple juice, (or take 4 glucose tabs/tube of glucose gel) and recheck fingerstick in 15 minutes.  Repeat these steps until blood sugar is above 100 mg/dl, IF NECESSARY.  Then call provider listed above to discuss low blood sugar at (phone number) [phone]    -What to expect at follow appointment:  Please bring a log of your fingersticks and/or your glucometer to your appointment.  Your blood sugar tracking will help your diabetes team determine the best plan.    Insulin regimen:  Novolog 70/30 flex pen 32 units prebreakfast, 16 units pre dinner.  Check fingersticks before Breakfast, lunch, dinner.    Additional Discharge recs:  Discontinue metformin   Diabetes supplies to be orderd via E-prescribe:    (Please order at least 24 hours prior to discharge to avoid any unforseen insurance/discharge issues.)    - Alcohol pads  - Glucometer as per insurance  - Test strips  3x/day  - Lancets      3x/day  - BD mercedes needles    Discharge Appointments: June 29th at 9:15am with Tamera Portillo NP
28 yo male with diabetes, likely type 2 given poor diet, will r/o type 1 dm.  a/w mild DKA, hx of developmental disability, schizoaffective d/o
28 yo male with ho schizoaff d/o, DM, autism spectrum d/o a/w mild DKA due to uncontrolled DM
27 y.o. male with h/o uncontrolled Type 2 DM and hyperlipidemia presents with mild DKA

## 2017-05-31 NOTE — PROGRESS NOTE ADULT - PROBLEM SELECTOR PLAN 4
ADA diet  DM education with family for healthy food choices
ADA diet  DM education with family for healthy food choices done with RD this AM
stable

## 2017-05-31 NOTE — PROGRESS NOTE ADULT - PROBLEM SELECTOR PLAN 1
DKA resolved  Appreciate endocrine recs  On DC, start Novolog 70/30 flex pen 32 units prebreakfast, 16 units pre dinner.  Check fingersticks before Breakfast, lunch, dinner.

## 2017-05-31 NOTE — PROGRESS NOTE ADULT - PROBLEM SELECTOR PROBLEM 1
DKA (diabetic ketoacidoses)
Type 2 diabetes mellitus with hyperglycemia, without long-term current use of insulin
Uncontrolled type 2 diabetes mellitus with ketoacidosis without coma, without long-term current use of insulin
Uncontrolled type 2 diabetes mellitus with ketoacidosis without coma, without long-term current use of insulin
DKA (diabetic ketoacidoses)
Type 2 diabetes mellitus without complication, without long-term current use of insulin
Uncontrolled type 2 diabetes mellitus with ketoacidosis without coma, without long-term current use of insulin

## 2017-05-31 NOTE — PROGRESS NOTE ADULT - SUBJECTIVE AND OBJECTIVE BOX
Chief Complaint: uncontrolled dm2    History:  27 y.o. male with h/o uncontrolled Type 2 DM presented with mild DKA. Feeling good today. Tolerating p.o. intake. No nausea and no vomiting. No abdominal pain. No SOB or CP.    MEDICATIONS  (STANDING):  propranolol 40milliGRAM(s) Oral two times a day  diVALproex DR 500milliGRAM(s) Oral two times a day  benztropine 1milliGRAM(s) Oral two times a day  haloperidol     Tablet 20milliGRAM(s) Oral at bedtime  insulin lispro (HumaLOG) corrective regimen sliding scale  SubCutaneous three times a day before meals  insulin lispro (HumaLOG) corrective regimen sliding scale  SubCutaneous at bedtime  dextrose 5%. 1000milliLiter(s) IV Continuous <Continuous>  dextrose 50% Injectable 12.5Gram(s) IV Push once  dextrose 50% Injectable 25Gram(s) IV Push once  dextrose 50% Injectable 25Gram(s) IV Push once  magnesium oxide 400milliGRAM(s) Oral three times a day with meals  insulin lispro Injectable (HumaLOG) 8Unit(s) SubCutaneous three times a day before meals  insulin glargine Injectable (LANTUS) 20Unit(s) SubCutaneous every morning  atorvastatin 40milliGRAM(s) Oral at bedtime    MEDICATIONS  (PRN):  dextrose Gel 1Dose(s) Oral once PRN Blood Glucose LESS THAN 70 milliGRAM(s)/deciLiter  glucagon  Injectable 1milliGRAM(s) IntraMuscular once PRN Glucose <70 milliGRAM(s)/deciLiter      Allergies    No Known Allergies    Intolerances      Review of Systems:  Constitutional: No fever  Eyes: No blurry vision  Neuro: No tremors  HEENT: No pain  Cardiovascular: No chest pain, palpitations  Respiratory: No SOB, no cough  GI: No nausea, vomiting, abdominal pain  : No dysuria  Skin: no rash  Psych: no depression  Endocrine: no polyuria, polydipsia  Hem/lymph: no swelling  Osteoporosis: no fractures    ALL OTHER SYSTEMS REVIEWED AND NEGATIVE        PHYSICAL EXAM:  Vital Signs Last 24 Hrs  T(C): 36.9, Max: 36.9 (05-31 @ 05:13)  T(F): 98.4, Max: 98.4 (05-31 @ 05:13)  HR: 63 (63 - 77)  BP: 111/76 (105/67 - 126/81)  BP(mean): --  RR: 19 (18 - 20)  SpO2: 100% (100% - 100%)  Wt(kg): --  GENERAL: NAD, well-groomed, well-developed  EYES: No proptosis, no lid lag, anicteric  HEENT:  Atraumatic, Normocephalic, moist mucous membranes  THYROID: Normal size, no palpable nodules  RESPIRATORY: Clear to auscultation bilaterally; No rales, rhonchi, wheezing, or rubs  CARDIOVASCULAR: Regular rate and rhythm; No murmurs; no peripheral edema  GI: Soft, nontender, non distended, normal bowel sounds  SKIN: Dry, intact, No rashes or lesions  MUSCULOSKELETAL: Full range of motion, normal strength  NEURO: sensation intact, extraocular movements intact, no tremor, normal reflexes  PSYCH: Alert and oriented x 3, normal affect, normal mood  CUSHING'S SIGNS: no striae    CAPILLARY BLOOD GLUCOSE  123 (31 May 2017 11:44)  179 (31 May 2017 07:52)  202 (05-30 @ 11:30)  172 (05-30 @ 07:46)  285 (05-29 @ 22:00)  152 (05-29 @ 16:17)  240 (05-29 @ 11:19)  188 (05-29 @ 07:44)  245 (05-28 @ 21:45)  171 (05-28 @ 16:25)  200 (05-28 @ 11:27)  231 (05-28 @ 07:45)  255 (05-27 @ 22:20)  102 (05-27 @ 17:03)      05-30    140  |  104  |  8   ----------------------------<  201<H>  3.8   |  24  |  0.62    EGFR if : 159  EGFR if non : 137    Ca    8.6      05-30  Mg     1.6     05-30  Phos  4.2     05-30    Hemoglobin A1C, Whole Blood: 16.7 % <H> [4.0 - 5.6] (05-27 @ 01:30)

## 2017-05-31 NOTE — PROGRESS NOTE ADULT - PROBLEM SELECTOR PLAN 3
ADA diet
ADA diet  DM education with family for healthy food choices
calm ,cooperative, + bipolar disorder  Continue home meds: haldol, cogentin, depakote
calm ,cooperative, + bipolar disorder  Continue home meds: haldol, cogentin, depakote
ADA diet

## 2017-05-31 NOTE — PROGRESS NOTE ADULT - SUBJECTIVE AND OBJECTIVE BOX
Patient is a 27y old  Male who presents with a chief complaint of hyperglycemia (28 May 2017 17:26)      SUBJECTIVE / OVERNIGHT EVENTS: No acute events overnight. No complaints from patient. ROS unreliable due to autism.    MEDICATIONS  (STANDING):  propranolol 40milliGRAM(s) Oral two times a day  diVALproex DR 500milliGRAM(s) Oral two times a day  benztropine 1milliGRAM(s) Oral two times a day  haloperidol     Tablet 20milliGRAM(s) Oral at bedtime  insulin lispro (HumaLOG) corrective regimen sliding scale  SubCutaneous three times a day before meals  insulin lispro (HumaLOG) corrective regimen sliding scale  SubCutaneous at bedtime  dextrose 5%. 1000milliLiter(s) IV Continuous <Continuous>  dextrose 50% Injectable 12.5Gram(s) IV Push once  dextrose 50% Injectable 25Gram(s) IV Push once  dextrose 50% Injectable 25Gram(s) IV Push once  insulin glargine Injectable (LANTUS) 20Unit(s) SubCutaneous every morning  atorvastatin 40milliGRAM(s) Oral at bedtime  insulin lispro Injectable (HumaLOG) 10Unit(s) SubCutaneous three times a day before meals    MEDICATIONS  (PRN):  dextrose Gel 1Dose(s) Oral once PRN Blood Glucose LESS THAN 70 milliGRAM(s)/deciLiter  glucagon  Injectable 1milliGRAM(s) IntraMuscular once PRN Glucose <70 milliGRAM(s)/deciLiter      Vital Signs Last 24 Hrs  T(C): 36, Max: 36.9 (05-31 @ 05:13)  HR: 71 (63 - 72)  BP: 115/70 (105/67 - 126/81)  RR: 18 (18 - 20)  SpO2: 100% (100% - 100%)  Wt(kg): --  CAPILLARY BLOOD GLUCOSE  123 (31 May 2017 11:44)  179 (31 May 2017 07:52)  234 (30 May 2017 21:34)  89 (30 May 2017 16:00)    I&O's Summary      PHYSICAL EXAM:  no apparent distress  s1/s2  lungs clear b/l  abd soft, NT/ND  no LE edema  awake, alert, answers Qs, knows he is in hospital but unsure why  slow speech, autistic  no skin rashes    LABS:                        13.6   8.14  )-----------( 183      ( 30 May 2017 06:02 )             40.2     05-30    140  |  104  |  8   ----------------------------<  201<H>  3.8   |  24  |  0.62    Ca    8.6      30 May 2017 06:02  Phos  4.2     05-30  Mg     1.6     05-30

## 2017-06-01 LAB — ISLET CELL512 AB SER-ACNC: <5 — SIGNIFICANT CHANGE UP

## 2017-06-02 LAB — GAD65 AB SER-MCNC: 0 NMOL/L — SIGNIFICANT CHANGE UP

## 2017-06-03 LAB — INSULIN HUMAN IGE QN: <0.4 U/ML — SIGNIFICANT CHANGE UP

## 2017-07-27 ENCOUNTER — APPOINTMENT (OUTPATIENT)
Dept: ENDOCRINOLOGY | Facility: CLINIC | Age: 27
End: 2017-07-27

## 2017-10-31 PROCEDURE — 99213 OFFICE O/P EST LOW 20 MIN: CPT

## 2018-05-26 ENCOUNTER — EMERGENCY (EMERGENCY)
Facility: HOSPITAL | Age: 28
LOS: 1 days | Discharge: HOME | End: 2018-05-26
Admitting: EMERGENCY MEDICINE
Payer: MEDICARE

## 2018-05-26 VITALS
RESPIRATION RATE: 18 BRPM | HEART RATE: 94 BPM | TEMPERATURE: 98 F | DIASTOLIC BLOOD PRESSURE: 90 MMHG | SYSTOLIC BLOOD PRESSURE: 136 MMHG

## 2018-05-26 PROCEDURE — 99283 EMERGENCY DEPT VISIT LOW MDM: CPT

## 2018-05-26 RX ORDER — CHLORPROMAZINE HCL 10 MG
50 TABLET ORAL ONCE
Qty: 0 | Refills: 0 | Status: COMPLETED | OUTPATIENT
Start: 2018-05-26 | End: 2018-05-26

## 2018-05-26 RX ADMIN — Medication 50 MILLIGRAM(S): at 14:34

## 2018-05-26 NOTE — ED PROVIDER NOTE - MEDICAL DECISION MAKING DETAILS
27 y/o M hx Bipolar, MR, Obesity, Schizophrenia  No evidence of physical injuries, broken  skin or deformities.       Medical evaluation performed. There is no clinical evidence of intoxication or any acute medical problem requiring immediate intervention. Discuss plan with parent.

## 2018-05-26 NOTE — ED PROVIDER NOTE - OBJECTIVE STATEMENT
27 y/o M hx Bipolar, MR, Obesity, Schizophrenia BIB parent  w c/o agitation secondary to verbal alteration while  having a bar b que . Denies any physical altercation. Denies pain, SOB, fever, chills, chest/abdominal discomfort. Denies falling, punching or kicking any objects. Denies SI/HI/AH/VH. Denies recent use of alcohol or illicit drugs.

## 2018-07-02 ENCOUNTER — EMERGENCY (EMERGENCY)
Facility: HOSPITAL | Age: 28
LOS: 1 days | Discharge: HOME | End: 2018-07-02
Attending: EMERGENCY MEDICINE | Admitting: EMERGENCY MEDICINE
Payer: MEDICAID

## 2018-07-02 VITALS
TEMPERATURE: 98 F | HEART RATE: 92 BPM | SYSTOLIC BLOOD PRESSURE: 149 MMHG | OXYGEN SATURATION: 100 % | RESPIRATION RATE: 17 BRPM | DIASTOLIC BLOOD PRESSURE: 104 MMHG

## 2018-07-02 VITALS
TEMPERATURE: 98 F | SYSTOLIC BLOOD PRESSURE: 143 MMHG | HEART RATE: 88 BPM | RESPIRATION RATE: 18 BRPM | OXYGEN SATURATION: 98 % | DIASTOLIC BLOOD PRESSURE: 88 MMHG

## 2018-07-02 LAB
ALBUMIN SERPL ELPH-MCNC: 4.4 G/DL — SIGNIFICANT CHANGE UP (ref 3.3–5)
ALP SERPL-CCNC: 62 U/L — SIGNIFICANT CHANGE UP (ref 40–120)
ALT FLD-CCNC: 10 U/L — SIGNIFICANT CHANGE UP (ref 4–41)
AST SERPL-CCNC: 12 U/L — SIGNIFICANT CHANGE UP (ref 4–40)
BASOPHILS # BLD AUTO: 0.01 K/UL — SIGNIFICANT CHANGE UP (ref 0–0.2)
BASOPHILS NFR BLD AUTO: 0.1 % — SIGNIFICANT CHANGE UP (ref 0–2)
BILIRUB SERPL-MCNC: 0.4 MG/DL — SIGNIFICANT CHANGE UP (ref 0.2–1.2)
BUN SERPL-MCNC: 14 MG/DL — SIGNIFICANT CHANGE UP (ref 7–23)
CALCIUM SERPL-MCNC: 9.2 MG/DL — SIGNIFICANT CHANGE UP (ref 8.4–10.5)
CHLORIDE SERPL-SCNC: 93 MMOL/L — LOW (ref 98–107)
CO2 SERPL-SCNC: 26 MMOL/L — SIGNIFICANT CHANGE UP (ref 22–31)
CREAT SERPL-MCNC: 0.91 MG/DL — SIGNIFICANT CHANGE UP (ref 0.5–1.3)
EOSINOPHIL # BLD AUTO: 0.02 K/UL — SIGNIFICANT CHANGE UP (ref 0–0.5)
EOSINOPHIL NFR BLD AUTO: 0.2 % — SIGNIFICANT CHANGE UP (ref 0–6)
GLUCOSE SERPL-MCNC: 425 MG/DL — HIGH (ref 70–99)
HCT VFR BLD CALC: 45.6 % — SIGNIFICANT CHANGE UP (ref 39–50)
HGB BLD-MCNC: 14.8 G/DL — SIGNIFICANT CHANGE UP (ref 13–17)
IMM GRANULOCYTES # BLD AUTO: 0.03 # — SIGNIFICANT CHANGE UP
IMM GRANULOCYTES NFR BLD AUTO: 0.3 % — SIGNIFICANT CHANGE UP (ref 0–1.5)
LYMPHOCYTES # BLD AUTO: 3.43 K/UL — HIGH (ref 1–3.3)
LYMPHOCYTES # BLD AUTO: 37.2 % — SIGNIFICANT CHANGE UP (ref 13–44)
MCHC RBC-ENTMCNC: 26.9 PG — LOW (ref 27–34)
MCHC RBC-ENTMCNC: 32.5 % — SIGNIFICANT CHANGE UP (ref 32–36)
MCV RBC AUTO: 82.9 FL — SIGNIFICANT CHANGE UP (ref 80–100)
MONOCYTES # BLD AUTO: 0.87 K/UL — SIGNIFICANT CHANGE UP (ref 0–0.9)
MONOCYTES NFR BLD AUTO: 9.4 % — SIGNIFICANT CHANGE UP (ref 2–14)
NEUTROPHILS # BLD AUTO: 4.87 K/UL — SIGNIFICANT CHANGE UP (ref 1.8–7.4)
NEUTROPHILS NFR BLD AUTO: 52.8 % — SIGNIFICANT CHANGE UP (ref 43–77)
NRBC # FLD: 0 — SIGNIFICANT CHANGE UP
PLATELET # BLD AUTO: 254 K/UL — SIGNIFICANT CHANGE UP (ref 150–400)
PMV BLD: 10.8 FL — SIGNIFICANT CHANGE UP (ref 7–13)
POTASSIUM SERPL-MCNC: 4 MMOL/L — SIGNIFICANT CHANGE UP (ref 3.5–5.3)
POTASSIUM SERPL-SCNC: 4 MMOL/L — SIGNIFICANT CHANGE UP (ref 3.5–5.3)
PROT SERPL-MCNC: 8.2 G/DL — SIGNIFICANT CHANGE UP (ref 6–8.3)
RBC # BLD: 5.5 M/UL — SIGNIFICANT CHANGE UP (ref 4.2–5.8)
RBC # FLD: 12.9 % — SIGNIFICANT CHANGE UP (ref 10.3–14.5)
SODIUM SERPL-SCNC: 134 MMOL/L — LOW (ref 135–145)
WBC # BLD: 9.23 K/UL — SIGNIFICANT CHANGE UP (ref 3.8–10.5)
WBC # FLD AUTO: 9.23 K/UL — SIGNIFICANT CHANGE UP (ref 3.8–10.5)

## 2018-07-02 PROCEDURE — 99284 EMERGENCY DEPT VISIT MOD MDM: CPT | Mod: GC

## 2018-07-02 RX ORDER — DIPHENHYDRAMINE HCL 50 MG
25 CAPSULE ORAL ONCE
Qty: 0 | Refills: 0 | Status: COMPLETED | OUTPATIENT
Start: 2018-07-02 | End: 2018-07-02

## 2018-07-02 RX ORDER — SODIUM CHLORIDE 9 MG/ML
1000 INJECTION INTRAMUSCULAR; INTRAVENOUS; SUBCUTANEOUS ONCE
Qty: 0 | Refills: 0 | Status: COMPLETED | OUTPATIENT
Start: 2018-07-02 | End: 2018-07-02

## 2018-07-02 RX ADMIN — SODIUM CHLORIDE 1000 MILLILITER(S): 9 INJECTION INTRAMUSCULAR; INTRAVENOUS; SUBCUTANEOUS at 23:25

## 2018-07-02 RX ADMIN — Medication 25 MILLIGRAM(S): at 22:15

## 2018-07-02 NOTE — ED PROVIDER NOTE - ATTENDING CONTRIBUTION TO CARE
29yo M PMH: bipolar disorder, MR, schizophrenia presents with father for b/l hand tremors, first noticed this morning, intermittent, similar to prior episodes usually attributed to side effect from Haldol, pt states he felt chills this morning, denies cough, n/v, abd pain  On exam awake & alert, NAD., mmm, lungs CTAB, RRR, abdomen soft NT/ND, 2+ pulses b/l, neuro A&Ox3, no focal deficits, no visible tremor currently, gait normal, skin warm and dry no rash

## 2018-07-02 NOTE — ED PROVIDER NOTE - MEDICAL DECISION MAKING DETAILS
28M here for tremor in hand(s). Pt difficult to understand in mild distress on exam. Small resting tremor noted in R hand during lung ascultation. Neuro intact otherwise. Will check basic labs w/ likely DC neuro f/u.

## 2018-07-02 NOTE — ED PROVIDER NOTE - PROGRESS NOTE DETAILS
[place]: FSG improving, other labs grossly wnl, given copy of results, comfortable for dc, outpt pmd /psych f/u.

## 2018-07-02 NOTE — ED PROVIDER NOTE - OBJECTIVE STATEMENT
29 y/o M hx Bipolar, MR, Obesity, Schizophrenia BIB parent for tremor 29 y/o M hx Bipolar, MR, Obesity, Schizophrenia, DM BIB parent for tremor in R hand. Started this AM. Pt is concerned it is related to his blood sugar level. Pt is compliant with his medications (relies on Mom) and has had no recent changes to his medication regimen. He takes Haldol 20mg nightly. He is also on benztropine for neuroleptic side effect control. Pt denies recent illness, pain anywhere.

## 2018-07-02 NOTE — ED ADULT TRIAGE NOTE - CHIEF COMPLAINT QUOTE
p/t with hx  MR , schizoaffective disorder brought in by father with c/o of tremors to upper extremities since this am, nad noted p/t ambulatory

## 2018-07-02 NOTE — ED ADULT NURSE NOTE - OBJECTIVE STATEMENT
Pt received to room 8. Presents alert from home w/ c/o bilateral hand tremors. Pt with MR history. RAC 20 gauge IV placed and labs sent. Pt medicated as ordered. Will continue to monitor.

## 2018-07-02 NOTE — ED PROVIDER NOTE - PLAN OF CARE
1) Please follow-up with a neurologist about your symptom (list attached).  If you cannot follow-up with your doctor(s), please return to the ED for any urgent issues.  2) If you have any worsening of symptoms or any other concerns please return to the ED immediately.  3) Please continue taking your home medications as directed.  4) You may have been given a copy of your labs and/or imaging.  Please go over these with your primary care doctor.

## 2018-07-02 NOTE — ED PROVIDER NOTE - CARE PLAN
Assessment and plan of treatment:	1) Please follow-up with a neurologist about your symptom (list attached).  If you cannot follow-up with your doctor(s), please return to the ED for any urgent issues.  2) If you have any worsening of symptoms or any other concerns please return to the ED immediately.  3) Please continue taking your home medications as directed.  4) You may have been given a copy of your labs and/or imaging.  Please go over these with your primary care doctor. Principal Discharge DX:	Tremor  Assessment and plan of treatment:	1) Please follow-up with a neurologist about your symptom (list attached).  If you cannot follow-up with your doctor(s), please return to the ED for any urgent issues.  2) If you have any worsening of symptoms or any other concerns please return to the ED immediately.  3) Please continue taking your home medications as directed.  4) You may have been given a copy of your labs and/or imaging.  Please go over these with your primary care doctor.

## 2018-07-03 RX ORDER — OXYCODONE AND ACETAMINOPHEN 5; 325 MG/1; MG/1
1 TABLET ORAL ONCE
Qty: 0 | Refills: 0 | Status: DISCONTINUED | OUTPATIENT
Start: 2018-07-03 | End: 2018-07-03

## 2018-12-16 ENCOUNTER — EMERGENCY (EMERGENCY)
Facility: HOSPITAL | Age: 28
LOS: 1 days | Discharge: HOME | End: 2018-12-16
Attending: EMERGENCY MEDICINE | Admitting: EMERGENCY MEDICINE
Payer: MEDICARE

## 2018-12-16 VITALS
SYSTOLIC BLOOD PRESSURE: 141 MMHG | DIASTOLIC BLOOD PRESSURE: 93 MMHG | HEART RATE: 80 BPM | OXYGEN SATURATION: 100 % | RESPIRATION RATE: 18 BRPM | TEMPERATURE: 98 F

## 2018-12-16 LAB
HCT VFR BLD CALC: 46.3 % — SIGNIFICANT CHANGE UP (ref 39–50)
HGB BLD-MCNC: 15.5 G/DL — SIGNIFICANT CHANGE UP (ref 13–17)
MCHC RBC-ENTMCNC: 27.9 PG — SIGNIFICANT CHANGE UP (ref 27–34)
MCHC RBC-ENTMCNC: 33.5 % — SIGNIFICANT CHANGE UP (ref 32–36)
MCV RBC AUTO: 83.3 FL — SIGNIFICANT CHANGE UP (ref 80–100)
NRBC # FLD: 0 — SIGNIFICANT CHANGE UP
PLATELET # BLD AUTO: 269 K/UL — SIGNIFICANT CHANGE UP (ref 150–400)
PMV BLD: 10.9 FL — SIGNIFICANT CHANGE UP (ref 7–13)
RBC # BLD: 5.56 M/UL — SIGNIFICANT CHANGE UP (ref 4.2–5.8)
RBC # FLD: 12.6 % — SIGNIFICANT CHANGE UP (ref 10.3–14.5)
WBC # BLD: 8.31 K/UL — SIGNIFICANT CHANGE UP (ref 3.8–10.5)
WBC # FLD AUTO: 8.31 K/UL — SIGNIFICANT CHANGE UP (ref 3.8–10.5)

## 2018-12-16 PROCEDURE — 99284 EMERGENCY DEPT VISIT MOD MDM: CPT | Mod: GC

## 2018-12-16 RX ORDER — SODIUM CHLORIDE 9 MG/ML
1000 INJECTION INTRAMUSCULAR; INTRAVENOUS; SUBCUTANEOUS ONCE
Qty: 0 | Refills: 0 | Status: COMPLETED | OUTPATIENT
Start: 2018-12-16 | End: 2018-12-16

## 2018-12-16 RX ADMIN — SODIUM CHLORIDE 1000 MILLILITER(S): 9 INJECTION INTRAMUSCULAR; INTRAVENOUS; SUBCUTANEOUS at 23:00

## 2018-12-16 NOTE — ED PROVIDER NOTE - ATTENDING CONTRIBUTION TO CARE
Seen and examined, NAD at time of exam, states agitated PTA, in ED calm and allowing exam, per mother pt. at baseline mental status with intermittent hostile verbal behavior and occ. inappropriate sexual behavior. Unclear if compliant with meds, skipped this a.m. and took extra meds later. No SI/HI, no reported hallucinations. Clear lungs, heart reg, abd soft, NT to palp, no edema, no ecchymoses.

## 2018-12-16 NOTE — ED ADULT NURSE NOTE - OBJECTIVE STATEMENT
Pt received in #15 calm and cooperative. Per triage note, 911 was initiated by the pt's mother due to the pt being agitated/aggressive at home. Pt currently denies all often stating "I am okay" to various questions asked. Pt denies si, hi, ah/vh, or etoh/substance use. IV established, labs collected and pt medicated without incident.

## 2018-12-16 NOTE — ED ADULT TRIAGE NOTE - CHIEF COMPLAINT QUOTE
acting out    as per ems, mother states pt was argumentative, acting out, yelling, screaming, cursing... now cooperative and redirectable. hx of mr, dai, htn...ACCUCHECK 490

## 2018-12-16 NOTE — ED PROVIDER NOTE - PROGRESS NOTE DETAILS
Danielle CHILD: Patient medically cleared; spoke with Psychiatry and will have the patient transferred and further evaluated in .

## 2018-12-16 NOTE — ED PROVIDER NOTE - CARE PLAN
Principal Discharge DX:	Alcohol abuse  Secondary Diagnosis:	Agitation  Secondary Diagnosis:	Right hand pain

## 2018-12-16 NOTE — ED PROVIDER NOTE - OBJECTIVE STATEMENT
28 year-old male with history of bipolar, MR, schizophrenia, DM presents to the Emergency Department for aggressive behavior.  Patient denies any pain, trauma, recent altercations.  No fevers, chills, nausea, vomiting, chest pain, shortness of breath, abdominal pain.  Denies SI/HI.  Spoke with patient mother (Crista Caraballo) on the phone who expresses that the patient is regularly violent at home and gets into physical and verbal fights with family members on occasion; no attempts have been made with any weapons at this time.  No SI attempts.  Patient has been more aggressive since AM with more verbal altercations, showing his penis to other people and not taking his medications.  Later in the day family reports that patient had taken increase dose of his home medications; unknown how much he took.  His home medications include benztropine 1mg, metformin 1000 mg, propranolol 40mg, haldol 20 mg, divalproex 500mg. 28 year-old male with history of bipolar, MR, schizophrenia, DM presents to the Emergency Department for aggressive behavior.  Patient denies any pain, trauma, recent altercations.  No fevers, chills, nausea, vomiting, chest pain, shortness of breath, abdominal pain, dysuria.  Denies SI/HI.  Spoke with patient mother (Crista Caraballo) on the phone who expresses that the patient is regularly violent at home and gets into physical and verbal fights with family members on occasion; no attempts have been made with any weapons at this time.  No SI attempts.  Patient has been more aggressive since AM with more verbal altercations, showing his penis to other people and not taking his medications.  Later in the day family reports that patient had taken increase dose of his home medications; unknown how much he took.  His home medications include benztropine 1mg, metformin 1000 mg, propranolol 40mg, haldol 20 mg, divalproex 500mg.

## 2018-12-16 NOTE — ED PROVIDER NOTE - MEDICAL DECISION MAKING DETAILS
28 year-old male with history of bipolar, MR, schizophrenia, DM presents to the Emergency Department for aggressive behavior; plan to do blood work, fluids and recheck finger stick.  Plan to have BH evaluation given medication non-compliance and altercations at home.

## 2018-12-16 NOTE — ED PROVIDER NOTE - NSFOLLOWUPINSTRUCTIONS_ED_ALL_ED_FT
Follow-up with your Primary Care Physician within 24-48 hours.  Please return to the Emergency Department immediately for any new, worsening or concerning symptoms; specifically those included in the attached information brochure.    Can use Tylenol (up to 1000mg every 6 hours) or Ibuprofen (up to 600mg every 6 hours) as per package directions for pain - use only as needed.  These are over-the-counter medications - please respect the warnings on the label.   Do NOT use Tylenol with alcohol

## 2018-12-16 NOTE — ED ADULT NURSE NOTE - NSIMPLEMENTINTERV_GEN_ALL_ED
Implemented All Universal Safety Interventions:  [place] to call system. Call bell, personal items and telephone within reach. Instruct patient to call for assistance. Room bathroom lighting operational. Non-slip footwear when patient is off stretcher. Physically safe environment: no spills, clutter or unnecessary equipment. Stretcher in lowest position, wheels locked, appropriate side rails in place.

## 2018-12-16 NOTE — ED PROVIDER NOTE - PHYSICAL EXAMINATION
*Gen: NAD  *HEENT: NC/AT, MMM, airway patent, trachea midline  *CV: RRR, S1/S2 present, no murmurs/rubs/gallops  *Resp: no respiratory distress, LCTAB, no wheezing/rales/rhonchi  *Abd: non-distended, soft N/Tx4, no guarding or rigidity  *Neuro: no focal neuro deficits, moving all limbs appropriately  *Extremities: no gross deformity  *Skin: no rashes, no wounds   ~ Tiffanie Santos M.D.

## 2018-12-17 VITALS
OXYGEN SATURATION: 100 % | HEART RATE: 78 BPM | TEMPERATURE: 98 F | SYSTOLIC BLOOD PRESSURE: 135 MMHG | RESPIRATION RATE: 18 BRPM | DIASTOLIC BLOOD PRESSURE: 92 MMHG

## 2018-12-17 DIAGNOSIS — F39 UNSPECIFIED MOOD [AFFECTIVE] DISORDER: ICD-10-CM

## 2018-12-17 DIAGNOSIS — F63.81 INTERMITTENT EXPLOSIVE DISORDER: ICD-10-CM

## 2018-12-17 DIAGNOSIS — F71 MODERATE INTELLECTUAL DISABILITIES: ICD-10-CM

## 2018-12-17 DIAGNOSIS — R69 ILLNESS, UNSPECIFIED: ICD-10-CM

## 2018-12-17 LAB
ALBUMIN SERPL ELPH-MCNC: 4.5 G/DL — SIGNIFICANT CHANGE UP (ref 3.3–5)
ALP SERPL-CCNC: 75 U/L — SIGNIFICANT CHANGE UP (ref 40–120)
ALT FLD-CCNC: 14 U/L — SIGNIFICANT CHANGE UP (ref 4–41)
AST SERPL-CCNC: 31 U/L — SIGNIFICANT CHANGE UP (ref 4–40)
BILIRUB SERPL-MCNC: 0.3 MG/DL — SIGNIFICANT CHANGE UP (ref 0.2–1.2)
BUN SERPL-MCNC: 7 MG/DL — SIGNIFICANT CHANGE UP (ref 7–23)
CALCIUM SERPL-MCNC: 9.7 MG/DL — SIGNIFICANT CHANGE UP (ref 8.4–10.5)
CHLORIDE SERPL-SCNC: 92 MMOL/L — LOW (ref 98–107)
CO2 SERPL-SCNC: 21 MMOL/L — LOW (ref 22–31)
CREAT SERPL-MCNC: 0.61 MG/DL — SIGNIFICANT CHANGE UP (ref 0.5–1.3)
GLUCOSE SERPL-MCNC: 493 MG/DL — CRITICAL HIGH (ref 70–99)
POTASSIUM SERPL-MCNC: 6.1 MMOL/L — HIGH (ref 3.5–5.3)
POTASSIUM SERPL-SCNC: 6.1 MMOL/L — HIGH (ref 3.5–5.3)
PROT SERPL-MCNC: 8 G/DL — SIGNIFICANT CHANGE UP (ref 6–8.3)
SODIUM SERPL-SCNC: 130 MMOL/L — LOW (ref 135–145)

## 2018-12-17 PROCEDURE — 90792 PSYCH DIAG EVAL W/MED SRVCS: CPT

## 2018-12-17 RX ORDER — INSULIN LISPRO 100/ML
5 VIAL (ML) SUBCUTANEOUS ONCE
Qty: 0 | Refills: 0 | Status: COMPLETED | OUTPATIENT
Start: 2018-12-17 | End: 2018-12-17

## 2018-12-17 RX ORDER — SODIUM CHLORIDE 9 MG/ML
1000 INJECTION INTRAMUSCULAR; INTRAVENOUS; SUBCUTANEOUS ONCE
Qty: 0 | Refills: 0 | Status: COMPLETED | OUTPATIENT
Start: 2018-12-17 | End: 2018-12-17

## 2018-12-17 RX ADMIN — SODIUM CHLORIDE 2000 MILLILITER(S): 9 INJECTION INTRAMUSCULAR; INTRAVENOUS; SUBCUTANEOUS at 02:08

## 2018-12-17 RX ADMIN — SODIUM CHLORIDE 1000 MILLILITER(S): 9 INJECTION INTRAMUSCULAR; INTRAVENOUS; SUBCUTANEOUS at 02:08

## 2018-12-17 RX ADMIN — Medication 5 UNIT(S): at 04:45

## 2018-12-17 NOTE — ED BEHAVIORAL HEALTH ASSESSMENT NOTE - DESCRIPTION
intermittently crying and clinging on to sister with eyes close, follows commands and responsive to direct questions. diabetes Lives with parents and siblings calm and cooperative, did not require IM medication  ICU Vital Signs Last 24 Hrs  T(C): 36.6 (17 Dec 2018 03:45), Max: 36.7 (16 Dec 2018 21:48)  T(F): 97.8 (17 Dec 2018 03:45), Max: 98 (16 Dec 2018 21:48)  HR: 70 (17 Dec 2018 03:45) (70 - 80)  BP: 152/97 (17 Dec 2018 03:45) (141/93 - 152/97)  BP(mean): --  ABP: --  ABP(mean): --  RR: 18 (17 Dec 2018 03:45) (16 - 18)  SpO2: 100% (17 Dec 2018 03:45) (100% - 100%)

## 2018-12-17 NOTE — ED BEHAVIORAL HEALTH ASSESSMENT NOTE - RISK ASSESSMENT
Risk factors include aggression history, noncompliance, past hospitalizations.  Protective factors include supportive family, recently started on long acting injectable with [facility] AOP[facility] appointment in the morning Risk factors include aggression history, noncompliance, past hospitalizations.  Protective factors include supportive family, calm and cooperative in the ED, in outpatient treatment. Pt has chronic baseline risk given hx of aggressive outbursts at home, impulsivity, and poor frustration tolerance in keeping with low intellectual functioning and mental retardation. Inpatient psychiatric hospitalization is unlikely to target these chronic risks. Pt is not at an acutely elevated risk, he does not pose an imminent danger to self or others and does not meet criteria for involuntary psychiatric admission.

## 2018-12-17 NOTE — ED BEHAVIORAL HEALTH NOTE - BEHAVIORAL HEALTH NOTE
Writer called pt's mother  and left a voicemail for pt's mother.  Writer called pt's sister  Sim who states she is unable to transport patient as she is in school, but will call her mother.

## 2018-12-17 NOTE — ED BEHAVIORAL HEALTH ASSESSMENT NOTE - OTHER PAST PSYCHIATRIC HISTORY (INCLUDE DETAILS REGARDING ONSET, COURSE OF ILLNESS, INPATIENT/OUTPATIENT TREATMENT)
recently discharged from [facility] on Oct 8 2015.  Received prolixin injection 37.5 on Oct 1 2015. see hpi

## 2018-12-17 NOTE — ED BEHAVIORAL HEALTH ASSESSMENT NOTE - DIFFERENTIAL
bipolar disorder  intermittent explosive disorder  mild MR bipolar disorder NOS  intermittent explosive disorder  mild MR intermittent explosive disorder  MR (IQ 40)  Unspecified bipolar d/o

## 2018-12-17 NOTE — ED BEHAVIORAL HEALTH ASSESSMENT NOTE - CURRENT MEDICATION
divalproex 500mg bid  benztropine 1mg bid  fluphenazine 10mg bid Propranlol 40mg bid Divalproex 500mg  bid Haloperidol 20mg qd Benztropine 1mg bid.

## 2018-12-17 NOTE — ED BEHAVIORAL HEALTH NOTE - BEHAVIORAL HEALTH NOTE
Writer was informed patient was medically and psychiatrically cleared for discharge. writer called number for pt's mother rossana  continuously busy and  left voicemail.  writer called pt's sister Sim  left voicemail and received callback that she would coordinate  with her mother.  Writer obtained pt's mother's number  where writer called and left a voicemail.  Writer received a call from patient's mother stating she will transport patient home around 10am. She states she would like placement for patient as she cannot physically manage him when he is aggressive.  Writer suggested she contact pt's medicaid service coordinator and begin process for [employer] testing as she states patient is not connected to [employer].

## 2018-12-17 NOTE — ED BEHAVIORAL HEALTH ASSESSMENT NOTE - SUMMARY
Patient is a 24 yo man, with mild mental retardation, bipolar disorder, and intermittent explosive disorder, discharged from [facility] on divalproex 500mg bid, benztropine 1mg bid, fluphenazine 10mg bid and prolixin dec 37.5mg, who presented after refusing to take nighttime dose of medications. Patient lives with sister who accompanied him to the hospital and does not have any acute safety concerns.  Patient is denying symptoms of depression, anxiety, duke, or psychosis.  Denies suicidal/homicidal ideations. Patient is relatively calm in the ER and has appointment scheduled with [facility] resident James on Oct 8 2015 at 9am who was notified and aware of disposition. Patient is a 27 yo single never  man of [place] descent with no dependents, domiciled at home with parents who are his caregivers, unemployed, diagnosed with bipolar disorder, Intermittent Explosive Disorder, mental retardation (IQ=40), multiple inpatient psychiatric admissions last to [facility] in Sept 29 2015 to Oct 7 2015, currently in [facility] with Nury Pabon; he has a long history of aggression toward family and chronic poor frustration tolerance; no history of suicide, no legal problems, no substance use, no abuse history, PMH DM2 (poorly controlled) BIB EMS for agitation at home.Patient is denying symptoms of depression, anxiety, duke, or psychosis.  Denies suicidal/homicidal ideations. Patient is relatively calm in the ER and has appointment scheduled with [facility] resident James on Oct 8 2015 at 9am who was notified and aware of disposition. Patient is a 29 yo single never  man of [place] descent with no dependents, domiciled at home with parents who are his caregivers, unemployed, diagnosed with bipolar disorder, Intermittent Explosive Disorder, mental retardation (IQ=40), multiple inpatient psychiatric admissions last to [facility] in Sept 29 2015 to Oct 7 2015, currently in [facility] with Nury Pabon; he has a long history of aggression toward family and chronic poor frustration tolerance; no history of suicide, no legal problems, no substance use, no abuse history, PMH DM2 (poorly controlled) BIB EMS for agitation at home. On interview, pt is cooperative, smiling, limited intellectual functioning, makes good eye contact. Speech-dysarthric but not pressured. Mood "good", euthymic, thought processes are concrete. Denies SI/I/P or HI/I/P, denies delusions or AVH. He remained in good behavioral control throughout several hours stay in ED and did not require IM medication. Pt is not presenting as acutely manic, psychotic or depressed. He has a chronic hx of aggressive outbursts at home, impulsivity, and poor frustration tolerance in keeping with low intellectual functioning and mental retardation. Inpatient psychiatric hospitalization is unlikely to target these chronic Patient is a 29 yo single never  man of [facility] descent with no dependents, domiciled at home with parents who are his caregivers, unemployed, diagnosed with bipolar disorder, Intermittent Explosive Disorder, mental retardation (IQ=40), multiple inpatient psychiatric admissions last to [facility] in Sept 29 2015 to Oct 7 2015, currently in [facility] with Nury Pabon; he has a long history of aggression toward family and chronic poor frustration tolerance; no history of suicide, no legal problems, no substance use, no abuse history, PMH DM2 (poorly controlled) BIB EMS for agitation at home. On interview, pt is cooperative, smiling, limited intellectual functioning, makes good eye contact. Speech-dysarthric but not pressured. Mood "good", euthymic, thought processes are concrete. Denies SI/I/P or HI/I/P, denies delusions or AVH. He remained in good behavioral control throughout several hours stay in ED and did not require IM medication. Pt is not presenting as acutely manic, psychotic or depressed. He has a chronic hx of aggressive outbursts at home, impulsivity, and poor frustration tolerance in keeping with low intellectual functioning and mental retardation. Inpatient psychiatric hospitalization is unlikely to target these chronic behaviors, and there is better evidence for the efficacy of behavioral management treatment in the community setting, pt and family may also need more supports in the community for this persistent challenging behavior.

## 2018-12-17 NOTE — ED BEHAVIORAL HEALTH ASSESSMENT NOTE - PAST PSYCHOTROPIC MEDICATION
unknown prolixin, prolixin dec, haldol dec. CORTEZ was discontinued as pt would get agitated in days preceded CORTEZ administartion prolixin, prolixin dec, haldol dec. CORTEZ was discontinued as pt would get agitated in days preceded CORTEZ administration

## 2018-12-17 NOTE — ED BEHAVIORAL HEALTH ASSESSMENT NOTE - HPI (INCLUDE ILLNESS QUALITY, SEVERITY, DURATION, TIMING, CONTEXT, MODIFYING FACTORS, ASSOCIATED SIGNS AND SYMPTOMS)
Patient is a 29 yo single never  man of [place] descent with no dependents, domiciled at home with parents who are his caregivers, unemployed, diagnosed with bipolar disorder, Intermittent Explosive Disorder, mental retardation (IQ=40), multiple inpatient psychiatric admissions last to [facility] in Sept 29 2015 to Oct 7 2015, currently in [facility] with Nury Pabon; he has a long history of aggression toward family and chronic poor frustration tolerance; no history of suicide, no legal problems, no substance use, no abuse history, PMH DM2 (poorly controlled) BIB EMS for agitation at home.    Patient is accompanied by sister Lynette [phone].  She states that he lives with them.  Patient came into hospital crying and latched on to sister.  Sister states that patient was discharged from the hospital yesterday and was given medications prior to discharge.  She states that he took his medications this morning without difficulty.  Patient states that he was playing on the computer all day today and wants to go to [employer].  Sister states that she went to give him his nighttime medications and he refused.  She states that they got into a "scuffle."  She denies any physical violence or damage to property in the house, but states that she said that she was going to call 911 and patient went to grab the phone. She states that she does not have any acute concerns and states that he has an appointment with [facility] for intake with Dr. Ramirez Oct 9 2015 at 9am.     Patient is mild MR and limited in history, who presented incessantly crying and clinging on to sister.  He reports feeling "good." He states that he was playing on the computer all day and wants to go home.  He states that he was "scared" to take the medications but was unable to elaborate why since he took the morning medications.  He denies feeling depressed, anxious, manic.  He denies auditory/visual hallucinations, suicidal/homicidal ideations, intent or plans.     Sister and patient have no acute concerns and were educated about compliance of medications.  Patient agreed to take dose of prescribed medications prior to discharge. Patient is a 27 yo single never  man of [place] descent with no dependents, domiciled at home with parents who are his caregivers, unemployed, diagnosed with bipolar disorder, Intermittent Explosive Disorder, mental retardation (IQ=40), multiple inpatient psychiatric admissions last to [facility] in Sept 29 2015 to Oct 7 2015, currently in [facility] with Nury Pabon; he has a long history of aggression toward family and chronic poor frustration tolerance; no history of suicide, no legal problems, no substance use, no abuse history, PMH DM2 (poorly controlled) BIB EMS for agitation at home. On arrival pt found to have high blood glucose. Throughout stay in ED, pt has been calm and cooperative and did not require IM medication.     As per CVM patient is taking Propranlol 40mg bid Divalproex 500mg  bid Haloperidol 20mg qd Benztropine 1mg bid. Patient has an appointment with Nury Pabon NP today at 10 am. Pt has been missing appointments at [facility], refill was sent on 11/17.  accompanied by sister Lynette [phone].    Patient is mild MR and limited in history, who presented incessantly crying and clinging on to sister.  He reports feeling "good." He states that he was playing on the computer all day and wants to go home.  He states that he was "scared" to take the medications but was unable to elaborate why since he took the morning medications.  He denies feeling depressed, anxious, manic.  He denies auditory/visual hallucinations, suicidal/homicidal ideations, intent or plans.     Sister and patient have no acute concerns and were educated about compliance of medications.  Patient agreed to take dose of prescribed medications prior to discharge. Patient is a 27 yo single never  man of [place]ine[place] descent with no dependents, domiciled at home with parents who are his caregivers, unemployed, diagnosed with bipolar disorder, Intermittent Explosive Disorder, mental retardation (IQ=40), multiple inpatient psychiatric admissions last to [facility] in Sept 29 2015 to Oct 7 2015, currently in [facility] with Nury Pabon; he has a long history of aggression toward family and chronic poor frustration tolerance; no history of suicide, no legal problems, no substance use, no abuse history, PMH DM2 (poorly controlled) BIB EMS for agitation at home. On arrival pt found to have high blood glucose. Throughout stay in ED, pt has been calm and cooperative and did not require IM medication. On interview, pt is cooperative, smiling, limited intellectual functioning, makes good eye contact. Speech-dysarthric but not pressured. Mood "good", euthymic, thought processes are concrete. Denies SI/I/P or HI/I/P, denies delusions or AVH. He states that yesterday he "went to [employer] and was praying to Deonte". He states that his mother called 911, but he does not know why. He does not recall being aggressive and has no ongoing aggressive ideation. He states that he is taking his meds, he acknowledges missing appointments at [facility] because his dad who takes him to the appointments "was too tired" and "he couldn't find the place".  He denies feeling depressed, anxious, manic.      As per CVM patient is taking Propranlol 40mg bid Divalproex 500mg  bid Haloperidol 20mg qd Benztropine 1mg bid. Patient has an appointment with Nury Pabon NP today at 10 am. Pt has been missing appointments at [facility], refill was sent on 11/17.  accompanied by sister Lynette [phone].        Sister and patient have no acute concerns and were educated about compliance of medications.  Patient agreed to take dose of prescribed medications prior to discharge. Patient is a 29 yo single never  man of [place]onegas[place] descent with no dependents, domiciled at home with parents who are his caregivers, unemployed, diagnosed with bipolar disorder, Intermittent Explosive Disorder, mental retardation (IQ=40), multiple inpatient psychiatric admissions last to [facility] in Sept 29 2015 to Oct 7 2015, currently in [facility] with Nury Pabon; he has a long history of aggression toward family and chronic poor frustration tolerance; no history of suicide, no legal problems, no substance use, no abuse history, PMH DM2 (poorly controlled) BIB EMS for agitation at home. On arrival pt found to have high blood glucose. Throughout stay in ED, pt has been calm and cooperative and did not require IM medication.  He states that yesterday he "went to [employer] and was praying to [place]". He states that his mother called 911, but he does not know why. He does not recall being aggressive and has no ongoing aggressive ideation. He states that he is taking his meds, he acknowledges missing appointments at [facility] because his dad who takes him to the appointments "was too tired" and "he couldn't find the place".  He denies feeling depressed, anxious, manic.      As per CVM patient is taking Propranlol 40mg bid Divalproex 500mg  bid Haloperidol 20mg qd Benztropine 1mg bid. Patient has an appointment with Nury Pabon NP today at 10 am. Pt has been missing appointments at [facility], refill was sent on 11/17.        Sister and patient have no acute concerns and were educated about compliance of medications.  Patient agreed to take dose of prescribed medications prior to discharge. Patient is a 27 yo single never  man of [place] descent with no dependents, domiciled at home with parents who are his caregivers, unemployed, diagnosed with bipolar disorder, Intermittent Explosive Disorder, mental retardation (IQ=40), multiple inpatient psychiatric admissions last to [facility] in Sept 29 2015 to Oct 7 2015, currently in [facility] with Nury Pabon; he has a long history of aggression toward family and chronic poor frustration tolerance; no history of suicide, no legal problems, no substance use, no abuse history, PMH DM2 (poorly controlled) BIB EMS for agitation at home. On arrival pt found to have high blood glucose. Throughout stay in ED, pt has been calm and cooperative and did not require IM medication.  He states that yesterday he "went to [employer] and was praying to [place]onte". He states that his mother called 911, but he does not know why. He does not recall being aggressive and has no ongoing aggressive ideation. He states that he is taking his meds, he acknowledges missing appointments at [facility] because his dad who takes him to the appointments "was too tired" and "he couldn't find the place".  He denies feeling depressed, anxious, manic.      As per CVM patient is taking Propranlol 40mg bid Divalproex 500mg  bid Haloperidol 20mg qd Benztropine 1mg bid. Patient has an appointment with Nury Pabon NP today at 10 am. Pt has been missing appointments at [facility], refill was sent on 11/17.      As per sister Lynette Caraballo: Pt was aggressive earlier yesterday morning, refusing meds initially, but when he took the meds eventually, he took more than the daily dose out of anger, sister thinks perhaps 2 dose. Mother is struggling with patient's frequent aggressive outbursts. Family were trying to get him to long term care, but said he was too high functioning. Writer discussed assessment w sister, and made recommendations for increasing community supports (might benefit from referral to [facility] [place] and should bring this up with [facility] provider). Pt states that she can pick pt up at 12 noon, and she will call mom to see if she can  pt earlier. Sister agreed w plan.       Sister and patient have no acute concerns and were educated about compliance of medications.  Patient agreed to take dose of prescribed medications prior to discharge.

## 2018-12-17 NOTE — ED ADULT NURSE REASSESSMENT NOTE - NS ED NURSE REASSESS COMMENT FT1
Pt received in .  Alert, calm, and cooperative.  Pt aware of waiting for pick-up by mother or sister this morning.

## 2018-12-22 ENCOUNTER — INPATIENT (INPATIENT)
Facility: HOSPITAL | Age: 28
LOS: 16 days | Discharge: HOME | End: 2019-01-08
Attending: HOSPITALIST | Admitting: HOSPITALIST
Payer: MEDICARE

## 2018-12-22 VITALS
OXYGEN SATURATION: 100 % | WEIGHT: 198.42 LBS | DIASTOLIC BLOOD PRESSURE: 106 MMHG | SYSTOLIC BLOOD PRESSURE: 146 MMHG | TEMPERATURE: 98 F | RESPIRATION RATE: 17 BRPM | HEART RATE: 87 BPM

## 2018-12-22 LAB
ALBUMIN SERPL ELPH-MCNC: 3.8 G/DL — SIGNIFICANT CHANGE UP (ref 3.3–5)
ALBUMIN SERPL ELPH-MCNC: 4.7 G/DL — SIGNIFICANT CHANGE UP (ref 3.3–5)
ALP SERPL-CCNC: 66 U/L — SIGNIFICANT CHANGE UP (ref 40–120)
ALP SERPL-CCNC: 80 U/L — SIGNIFICANT CHANGE UP (ref 40–120)
ALT FLD-CCNC: 11 U/L — SIGNIFICANT CHANGE UP (ref 4–41)
ALT FLD-CCNC: 11 U/L — SIGNIFICANT CHANGE UP (ref 4–41)
AMPHET UR-MCNC: NEGATIVE — SIGNIFICANT CHANGE UP
APAP SERPL-MCNC: < 15 UG/ML — LOW (ref 15–25)
APPEARANCE UR: CLEAR — SIGNIFICANT CHANGE UP
APTT BLD: 28.9 SEC — SIGNIFICANT CHANGE UP (ref 27.5–36.3)
AST SERPL-CCNC: 13 U/L — SIGNIFICANT CHANGE UP (ref 4–40)
AST SERPL-CCNC: 20 U/L — SIGNIFICANT CHANGE UP (ref 4–40)
B-OH-BUTYR SERPL-SCNC: 2.8 MMOL/L — HIGH (ref 0–0.4)
BARBITURATES UR SCN-MCNC: NEGATIVE — SIGNIFICANT CHANGE UP
BASE EXCESS BLDV CALC-SCNC: -3.1 MMOL/L — SIGNIFICANT CHANGE UP
BASE EXCESS BLDV CALC-SCNC: 1.4 MMOL/L — SIGNIFICANT CHANGE UP
BASOPHILS # BLD AUTO: 0.02 K/UL — SIGNIFICANT CHANGE UP (ref 0–0.2)
BASOPHILS NFR BLD AUTO: 0.1 % — SIGNIFICANT CHANGE UP (ref 0–2)
BENZODIAZ UR-MCNC: NEGATIVE — SIGNIFICANT CHANGE UP
BILIRUB SERPL-MCNC: 0.3 MG/DL — SIGNIFICANT CHANGE UP (ref 0.2–1.2)
BILIRUB SERPL-MCNC: 0.4 MG/DL — SIGNIFICANT CHANGE UP (ref 0.2–1.2)
BILIRUB UR-MCNC: NEGATIVE — SIGNIFICANT CHANGE UP
BLOOD GAS VENOUS - CREATININE: 0.66 MG/DL — SIGNIFICANT CHANGE UP (ref 0.5–1.3)
BLOOD GAS VENOUS - CREATININE: SIGNIFICANT CHANGE UP MG/DL (ref 0.5–1.3)
BLOOD UR QL VISUAL: NEGATIVE — SIGNIFICANT CHANGE UP
BUN SERPL-MCNC: 8 MG/DL — SIGNIFICANT CHANGE UP (ref 7–23)
BUN SERPL-MCNC: 9 MG/DL — SIGNIFICANT CHANGE UP (ref 7–23)
CALCIUM SERPL-MCNC: 7.8 MG/DL — LOW (ref 8.4–10.5)
CALCIUM SERPL-MCNC: 9.8 MG/DL — SIGNIFICANT CHANGE UP (ref 8.4–10.5)
CANNABINOIDS UR-MCNC: NEGATIVE — SIGNIFICANT CHANGE UP
CHLORIDE BLDV-SCNC: 101 MMOL/L — SIGNIFICANT CHANGE UP (ref 96–108)
CHLORIDE BLDV-SCNC: 102 MMOL/L — SIGNIFICANT CHANGE UP (ref 96–108)
CHLORIDE SERPL-SCNC: 102 MMOL/L — SIGNIFICANT CHANGE UP (ref 98–107)
CHLORIDE SERPL-SCNC: 93 MMOL/L — LOW (ref 98–107)
CK MB BLD-MCNC: 1.89 NG/ML — SIGNIFICANT CHANGE UP (ref 1–6.6)
CK MB BLD-MCNC: SIGNIFICANT CHANGE UP (ref 0–2.5)
CK SERPL-CCNC: 82 U/L — SIGNIFICANT CHANGE UP (ref 30–200)
CO2 SERPL-SCNC: 15 MMOL/L — LOW (ref 22–31)
CO2 SERPL-SCNC: 22 MMOL/L — SIGNIFICANT CHANGE UP (ref 22–31)
COCAINE METAB.OTHER UR-MCNC: NEGATIVE — SIGNIFICANT CHANGE UP
COLOR SPEC: YELLOW — SIGNIFICANT CHANGE UP
CREAT SERPL-MCNC: 0.54 MG/DL — SIGNIFICANT CHANGE UP (ref 0.5–1.3)
CREAT SERPL-MCNC: 0.68 MG/DL — SIGNIFICANT CHANGE UP (ref 0.5–1.3)
EOSINOPHIL # BLD AUTO: 0.02 K/UL — SIGNIFICANT CHANGE UP (ref 0–0.5)
EOSINOPHIL NFR BLD AUTO: 0.1 % — SIGNIFICANT CHANGE UP (ref 0–6)
ETHANOL BLD-MCNC: < 10 MG/DL — SIGNIFICANT CHANGE UP
GAS PNL BLDV: 132 MMOL/L — LOW (ref 136–146)
GAS PNL BLDV: 135 MMOL/L — LOW (ref 136–146)
GLUCOSE BLDV-MCNC: 371 — HIGH (ref 70–99)
GLUCOSE BLDV-MCNC: 430 — HIGH (ref 70–99)
GLUCOSE SERPL-MCNC: 350 MG/DL — HIGH (ref 70–99)
GLUCOSE SERPL-MCNC: 443 MG/DL — HIGH (ref 70–99)
GLUCOSE UR-MCNC: 500 — HIGH
HCO3 BLDV-SCNC: 20 MMOL/L — SIGNIFICANT CHANGE UP (ref 20–27)
HCO3 BLDV-SCNC: 24 MMOL/L — SIGNIFICANT CHANGE UP (ref 20–27)
HCT VFR BLD CALC: 46.4 % — SIGNIFICANT CHANGE UP (ref 39–50)
HCT VFR BLDV CALC: 46.1 % — SIGNIFICANT CHANGE UP (ref 39–51)
HCT VFR BLDV CALC: 50.6 % — SIGNIFICANT CHANGE UP (ref 39–51)
HGB BLD-MCNC: 15.7 G/DL — SIGNIFICANT CHANGE UP (ref 13–17)
HGB BLDV-MCNC: 15.1 G/DL — SIGNIFICANT CHANGE UP (ref 13–17)
HGB BLDV-MCNC: 16.5 G/DL — SIGNIFICANT CHANGE UP (ref 13–17)
IMM GRANULOCYTES # BLD AUTO: 0.04 # — SIGNIFICANT CHANGE UP
IMM GRANULOCYTES NFR BLD AUTO: 0.3 % — SIGNIFICANT CHANGE UP (ref 0–1.5)
INR BLD: 1.04 — SIGNIFICANT CHANGE UP (ref 0.88–1.17)
KETONES UR-MCNC: 160 — SIGNIFICANT CHANGE UP
LACTATE BLDV-MCNC: 2.4 MMOL/L — HIGH (ref 0.5–2)
LACTATE BLDV-MCNC: 2.7 MMOL/L — HIGH (ref 0.5–2)
LEUKOCYTE ESTERASE UR-ACNC: NEGATIVE — SIGNIFICANT CHANGE UP
LYMPHOCYTES # BLD AUTO: 17.3 % — SIGNIFICANT CHANGE UP (ref 13–44)
LYMPHOCYTES # BLD AUTO: 2.36 K/UL — SIGNIFICANT CHANGE UP (ref 1–3.3)
MCHC RBC-ENTMCNC: 27.3 PG — SIGNIFICANT CHANGE UP (ref 27–34)
MCHC RBC-ENTMCNC: 33.8 % — SIGNIFICANT CHANGE UP (ref 32–36)
MCV RBC AUTO: 80.6 FL — SIGNIFICANT CHANGE UP (ref 80–100)
METHADONE UR-MCNC: NEGATIVE — SIGNIFICANT CHANGE UP
MONOCYTES # BLD AUTO: 0.82 K/UL — SIGNIFICANT CHANGE UP (ref 0–0.9)
MONOCYTES NFR BLD AUTO: 6 % — SIGNIFICANT CHANGE UP (ref 2–14)
NEUTROPHILS # BLD AUTO: 10.39 K/UL — HIGH (ref 1.8–7.4)
NEUTROPHILS NFR BLD AUTO: 76.2 % — SIGNIFICANT CHANGE UP (ref 43–77)
NITRITE UR-MCNC: NEGATIVE — SIGNIFICANT CHANGE UP
NRBC # FLD: 0 — SIGNIFICANT CHANGE UP
OPIATES UR-MCNC: POSITIVE — SIGNIFICANT CHANGE UP
OXYCODONE UR-MCNC: NEGATIVE — SIGNIFICANT CHANGE UP
PCO2 BLDV: 49 MMHG — SIGNIFICANT CHANGE UP (ref 41–51)
PCO2 BLDV: 53 MMHG — HIGH (ref 41–51)
PCP UR-MCNC: NEGATIVE — SIGNIFICANT CHANGE UP
PH BLDV: 7.26 PH — LOW (ref 7.32–7.43)
PH BLDV: 7.35 PH — SIGNIFICANT CHANGE UP (ref 7.32–7.43)
PH UR: 6 — SIGNIFICANT CHANGE UP (ref 5–8)
PLATELET # BLD AUTO: 235 K/UL — SIGNIFICANT CHANGE UP (ref 150–400)
PMV BLD: 11.2 FL — SIGNIFICANT CHANGE UP (ref 7–13)
PO2 BLDV: 31 MMHG — LOW (ref 35–40)
PO2 BLDV: 34 MMHG — LOW (ref 35–40)
POTASSIUM BLDV-SCNC: 3.7 MMOL/L — SIGNIFICANT CHANGE UP (ref 3.4–4.5)
POTASSIUM BLDV-SCNC: 3.9 MMOL/L — SIGNIFICANT CHANGE UP (ref 3.4–4.5)
POTASSIUM SERPL-MCNC: 3.9 MMOL/L — SIGNIFICANT CHANGE UP (ref 3.5–5.3)
POTASSIUM SERPL-MCNC: 3.9 MMOL/L — SIGNIFICANT CHANGE UP (ref 3.5–5.3)
POTASSIUM SERPL-SCNC: 3.9 MMOL/L — SIGNIFICANT CHANGE UP (ref 3.5–5.3)
POTASSIUM SERPL-SCNC: 3.9 MMOL/L — SIGNIFICANT CHANGE UP (ref 3.5–5.3)
PROT SERPL-MCNC: 6.7 G/DL — SIGNIFICANT CHANGE UP (ref 6–8.3)
PROT SERPL-MCNC: 8.1 G/DL — SIGNIFICANT CHANGE UP (ref 6–8.3)
PROT UR-MCNC: NEGATIVE — SIGNIFICANT CHANGE UP
PROTHROM AB SERPL-ACNC: 11.6 SEC — SIGNIFICANT CHANGE UP (ref 9.8–13.1)
RBC # BLD: 5.76 M/UL — SIGNIFICANT CHANGE UP (ref 4.2–5.8)
RBC # FLD: 12.5 % — SIGNIFICANT CHANGE UP (ref 10.3–14.5)
SALICYLATES SERPL-MCNC: < 5 MG/DL — LOW (ref 15–30)
SAO2 % BLDV: 50.3 % — LOW (ref 60–85)
SAO2 % BLDV: 55.7 % — LOW (ref 60–85)
SODIUM SERPL-SCNC: 135 MMOL/L — SIGNIFICANT CHANGE UP (ref 135–145)
SODIUM SERPL-SCNC: 137 MMOL/L — SIGNIFICANT CHANGE UP (ref 135–145)
SP GR SPEC: 1.02 — SIGNIFICANT CHANGE UP (ref 1–1.04)
TROPONIN T, HIGH SENSITIVITY: 27 NG/L — SIGNIFICANT CHANGE UP (ref ?–14)
TROPONIN T, HIGH SENSITIVITY: 45 NG/L — SIGNIFICANT CHANGE UP (ref ?–14)
UROBILINOGEN FLD QL: 0.2 — SIGNIFICANT CHANGE UP
WBC # BLD: 13.65 K/UL — HIGH (ref 3.8–10.5)
WBC # FLD AUTO: 13.65 K/UL — HIGH (ref 3.8–10.5)

## 2018-12-22 PROCEDURE — 71045 X-RAY EXAM CHEST 1 VIEW: CPT | Mod: 26

## 2018-12-22 RX ORDER — INSULIN HUMAN 100 [IU]/ML
5 INJECTION, SOLUTION SUBCUTANEOUS ONCE
Qty: 0 | Refills: 0 | Status: COMPLETED | OUTPATIENT
Start: 2018-12-22 | End: 2018-12-22

## 2018-12-22 RX ORDER — SODIUM CHLORIDE 9 MG/ML
2000 INJECTION INTRAMUSCULAR; INTRAVENOUS; SUBCUTANEOUS ONCE
Qty: 0 | Refills: 0 | Status: COMPLETED | OUTPATIENT
Start: 2018-12-22 | End: 2018-12-22

## 2018-12-22 RX ORDER — INSULIN HUMAN 100 [IU]/ML
3 INJECTION, SOLUTION SUBCUTANEOUS
Qty: 100 | Refills: 0 | Status: DISCONTINUED | OUTPATIENT
Start: 2018-12-22 | End: 2018-12-23

## 2018-12-22 RX ORDER — HEPARIN SODIUM 5000 [USP'U]/ML
INJECTION INTRAVENOUS; SUBCUTANEOUS
Qty: 25000 | Refills: 0 | Status: DISCONTINUED | OUTPATIENT
Start: 2018-12-22 | End: 2018-12-23

## 2018-12-22 RX ORDER — HEPARIN SODIUM 5000 [USP'U]/ML
5000 INJECTION INTRAVENOUS; SUBCUTANEOUS ONCE
Qty: 0 | Refills: 0 | Status: COMPLETED | OUTPATIENT
Start: 2018-12-22 | End: 2018-12-23

## 2018-12-22 RX ORDER — MORPHINE SULFATE 50 MG/1
4 CAPSULE, EXTENDED RELEASE ORAL ONCE
Qty: 0 | Refills: 0 | Status: DISCONTINUED | OUTPATIENT
Start: 2018-12-22 | End: 2018-12-22

## 2018-12-22 RX ORDER — ONDANSETRON 8 MG/1
4 TABLET, FILM COATED ORAL ONCE
Qty: 0 | Refills: 0 | Status: COMPLETED | OUTPATIENT
Start: 2018-12-22 | End: 2018-12-22

## 2018-12-22 RX ORDER — HEPARIN SODIUM 5000 [USP'U]/ML
5300 INJECTION INTRAVENOUS; SUBCUTANEOUS EVERY 6 HOURS
Qty: 0 | Refills: 0 | Status: DISCONTINUED | OUTPATIENT
Start: 2018-12-22 | End: 2018-12-25

## 2018-12-22 RX ADMIN — SODIUM CHLORIDE 2000 MILLILITER(S): 9 INJECTION INTRAMUSCULAR; INTRAVENOUS; SUBCUTANEOUS at 20:38

## 2018-12-22 RX ADMIN — MORPHINE SULFATE 4 MILLIGRAM(S): 50 CAPSULE, EXTENDED RELEASE ORAL at 17:44

## 2018-12-22 RX ADMIN — SODIUM CHLORIDE 2000 MILLILITER(S): 9 INJECTION INTRAMUSCULAR; INTRAVENOUS; SUBCUTANEOUS at 17:31

## 2018-12-22 RX ADMIN — INSULIN HUMAN 5 UNIT(S): 100 INJECTION, SOLUTION SUBCUTANEOUS at 20:40

## 2018-12-22 RX ADMIN — MORPHINE SULFATE 4 MILLIGRAM(S): 50 CAPSULE, EXTENDED RELEASE ORAL at 20:38

## 2018-12-22 RX ADMIN — Medication 2 MILLIGRAM(S): at 18:28

## 2018-12-22 RX ADMIN — INSULIN HUMAN 7 UNIT(S)/HR: 100 INJECTION, SOLUTION SUBCUTANEOUS at 20:42

## 2018-12-22 RX ADMIN — ONDANSETRON 4 MILLIGRAM(S): 8 TABLET, FILM COATED ORAL at 17:45

## 2018-12-22 RX ADMIN — SODIUM CHLORIDE 2000 MILLILITER(S): 9 INJECTION INTRAMUSCULAR; INTRAVENOUS; SUBCUTANEOUS at 23:16

## 2018-12-22 NOTE — CONSULT NOTE ADULT - SUBJECTIVE AND OBJECTIVE BOX
CHIEF COMPLAINT: Patient is a 28y old  Male who presents with a chief complaint of     HPI: 28 year old man history of uncontrolled T2DM on oral Metformin, intellectual disability, bipolar d/o, schizophrenia presents to the ED on  with complaints of chest pain.     PAST MEDICAL & SURGICAL HISTORY:  Schizo-affective schizophrenia  Bipolar 1 disorder  Obesity  Scoliosis  Mental retardation  No significant past surgical history    FAMILY HISTORY:  No pertinent family history in first degree relatives    SOCIAL HISTORY:  Smoking: [ ] Never Smoked [ ] Former Smoker (__ packs x ___ years) [x] Current Smoker 1 cigarette per day  Substance Use: [x] Never Used [ ] Used ____  EtOH Use: daily per family. [employer] Umu beers unknown amount   Marital Status: [x] Single [ ]  [ ]  [ ]   Sexual History: Unknown  Occupation:  Recent Travel: Denies   Country of Birth:  Advance Directives:    Allergies    No Known Allergies    Intolerances    HOME MEDICATIONS:    REVIEW OF SYSTEMS:  Constitutional: [ ] negative [ ] fevers [ ] chills [ ] weight loss [ ] weight gain  HEENT: [ ] negative [ ] dry eyes [ ] eye irritation [ ] postnasal drip [ ] nasal congestion  CV: [ ] negative  [ ] chest pain [ ] orthopnea [ ] palpitations [ ] murmur  Resp: [ ] negative [ ] cough [ ] shortness of breath [ ] dyspnea [ ] wheezing [ ] sputum [ ] hemoptysis  GI: [ ] negative [x] nausea [x] vomiting [ ] diarrhea [ ] constipation [ ] abd pain [ ] dysphagia   : [ ] negative [ ] dysuria [ ] nocturia [ ] hematuria [ ] increased urinary frequency  Musculoskeletal: [ ] negative [ ] back pain [ ] myalgias [ ] arthralgias [ ] fracture  Skin: [ ] negative [ ] rash [ ] itch  Neurological: [ ] negative [ ] headache [ ] dizziness [ ] syncope [ ] weakness [ ] numbness  Psychiatric: [ ] negative [ ] anxiety [ ] depression  Endocrine: [ ] negative [ ] diabetes [ ] thyroid problem  Hematologic/Lymphatic: [ ] negative [ ] anemia [ ] bleeding problem  Allergic/Immunologic: [ ] negative [ ] itchy eyes [ ] nasal discharge [ ] hives [ ] angioedema  [x] All other systems negative  [ ] Unable to assess ROS because ________    OBJECTIVE:  ICU Vital Signs Last 24 Hrs  T(C): 36.3 (22 Dec 2018 20:21), Max: 36.4 (22 Dec 2018 16:26)  T(F): 97.3 (22 Dec 2018 20:21), Max: 97.6 (22 Dec 2018 16:26)  HR: 118 (22 Dec 2018 21:57) (87 - 123)  BP: 150/101 (22 Dec 2018 21:23) (146/106 - 172/112)  RR: 23 (22 Dec 2018 21:57) (17 - 28)  SpO2: 99% (22 Dec 2018 21:57) (97% - 100%)     @ 07:01  -   @ 22:03  --------------------------------------------------------  IN: 0 mL / OUT: 675 mL / NET: -675 mL    CAPILLARY BLOOD GLUCOSE    POCT Blood Glucose.: 449 mg/dL (22 Dec 2018 21:22)    PHYSICAL EXAM:  General:   HEENT:   Lymph Nodes:  Neck:   Respiratory:   Cardiovascular:   Abdomen:   Extremities:   Skin:   Neurological:  Psychiatry:    LINES: [facility]     HOSPITAL MEDICATIONS:  insulin Infusion 7 Unit(s)/Hr IV Continuous <Continuous>    LABS:                        15.7   13.65 )-----------( 235      ( 22 Dec 2018 17:18 )             46.4     Hgb Trend: 15.7<--, 15.5<--      137  |  102  |  8   ----------------------------<  350<H>  3.9   |  15<L>  |  0.54    Ca    7.8<L>      22 Dec 2018 18:36    TPro  6.7  /  Alb  3.8  /  TBili  0.3  /  DBili  x   /  AST  20  /  ALT  11  /  AlkPhos  66      Creatinine Trend: 0.54<--, 0.68<--, 0.61<--    Urinalysis Basic - ( 22 Dec 2018 18:36 )    Color: YELLOW / Appearance: CLEAR / S.020 / pH: 6.0  Gluc: 500 / Ketone: 160  / Bili: NEGATIVE / Urobili: 0.2   Blood: NEGATIVE / Protein: NEGATIVE / Nitrite: NEGATIVE   Leuk Esterase: NEGATIVE / RBC: x / WBC x   Sq Epi: x / Non Sq Epi: x / Bacteria: x    Venous Blood Gas:   @ 18:36  7.26/53/34/20/55.7  VBG Lactate: 2.7  Venous Blood Gas:   @ 17:18  7.35/49/31/24/50.3  VBG Lactate: 2.4    MICROBIOLOGY:     RADIOLOGY:  [ ] Reviewed and interpreted by me    EKG: CHIEF COMPLAINT: Patient is a 28y old  Male who presents with a chief complaint of     HPI: 28 year old man history of uncontrolled T2DM on oral Metformin, intellectual disability, bipolar d/o, schizophrenia presents to the ED on  with complaints of chest pain. Sister at bedside providing collateral information. Reports severe non radiating band like sternal chest at 3pm associated with 2 episodes of NBNB emesis. Reports he has never has pain like this. Unable to state how long it lasted however no active chest pain at this time. Reports that he had Chinese food last night. Denies HA, diarrhea or constipation. No  or respiratory symptoms.   MICU consulted for concern for DKA. Sister reports that patient previously on insulin over a year ago but was taken off it due to fear/agitation with needles. Currently just on Metformin 500mg BID.     PAST MEDICAL & SURGICAL HISTORY:  Schizo-affective schizophrenia  Bipolar 1 disorder  Obesity  Scoliosis  Mental retardation  No significant past surgical history    FAMILY HISTORY:  No pertinent family history in first degree relatives    SOCIAL HISTORY:  Smoking: [ ] Never Smoked [ ] Former Smoker (__ packs x ___ years) [x] Current Smoker 1 cigarette per day  Substance Use: [x] Never Used [ ] Used ____  EtOH Use: daily per family. [employer] Umu beers unknown amount   Marital Status: [x] Single [ ]  [ ]  [ ]   Sexual History: Unknown  Occupation:  Recent Travel: Denies   Country of Birth:  Advance Directives:    Allergies    No Known Allergies    Intolerances    HOME MEDICATIONS:    REVIEW OF SYSTEMS:  Constitutional: [ ] negative [ ] fevers [ ] chills [ ] weight loss [ ] weight gain  HEENT: [ ] negative [ ] dry eyes [ ] eye irritation [ ] postnasal drip [ ] nasal congestion  CV: [ ] negative  [ ] chest pain [ ] orthopnea [ ] palpitations [ ] murmur  Resp: [ ] negative [ ] cough [ ] shortness of breath [ ] dyspnea [ ] wheezing [ ] sputum [ ] hemoptysis  GI: [ ] negative [x] nausea [x] vomiting [ ] diarrhea [ ] constipation [ ] abd pain [ ] dysphagia   : [ ] negative [ ] dysuria [ ] nocturia [ ] hematuria [ ] increased urinary frequency  Musculoskeletal: [ ] negative [ ] back pain [ ] myalgias [ ] arthralgias [ ] fracture  Skin: [ ] negative [ ] rash [ ] itch  Neurological: [ ] negative [ ] headache [ ] dizziness [ ] syncope [ ] weakness [ ] numbness  Psychiatric: [ ] negative [ ] anxiety [ ] depression  Endocrine: [ ] negative [ ] diabetes [ ] thyroid problem  Hematologic/Lymphatic: [ ] negative [ ] anemia [ ] bleeding problem  Allergic/Immunologic: [ ] negative [ ] itchy eyes [ ] nasal discharge [ ] hives [ ] angioedema  [x] All other systems negative  [ ] Unable to assess ROS because ________    OBJECTIVE:  ICU Vital Signs Last 24 Hrs  T(C): 36.3 (22 Dec 2018 20:21), Max: 36.4 (22 Dec 2018 16:26)  T(F): 97.3 (22 Dec 2018 20:21), Max: 97.6 (22 Dec 2018 16:26)  HR: 118 (22 Dec 2018 21:57) (87 - 123)  BP: 150/101 (22 Dec 2018 21:23) (146/106 - 172/112)  RR: 23 (22 Dec 2018 21:57) (17 - 28)  SpO2: 99% (22 Dec 2018 21:57) (97% - 100%)     @ 07:01  -   @ 22:03  --------------------------------------------------------  IN: 0 mL / OUT: 675 mL / NET: -675 mL    CAPILLARY BLOOD GLUCOSE    POCT Blood Glucose.: 449 mg/dL (22 Dec 2018 21:22)    PHYSICAL EXAM:  General:  man in NAD.   HEENT: Dry mucous membranes.  Lymph Nodes: None appreciated in anterior/posterior cervical chain.   Neck: Supple.   Respiratory: Good air entry. CTABL.   Cardiovascular: Tachycardic. S1/S2. No murmurs.   Abdomen: Soft. NT.ND. +BS  Extremities: Warm and dry. No edema   Skin: Warms and dry  Neurological: Alert and oriented. Moving all extremities       LINES: PIV     HOSPITAL MEDICATIONS:  insulin Infusion 7 Unit(s)/Hr IV Continuous <Continuous>    LABS:                        15.7   13.65 )-----------( 235      ( 22 Dec 2018 17:18 )             46.4     Hgb Trend: 15.7<--, 15.5<--      137  |  102  |  8   ----------------------------<  350<H>  3.9   |  15<L>  |  0.54    Ca    7.8<L>      22 Dec 2018 18:36    TPro  6.7  /  Alb  3.8  /  TBili  0.3  /  DBili  x   /  AST  20  /  ALT  11  /  AlkPhos  66      Creatinine Trend: 0.54<--, 0.68<--, 0.61<--    Urinalysis Basic - ( 22 Dec 2018 18:36 )    Color: YELLOW / Appearance: CLEAR / S.020 / pH: 6.0  Gluc: 500 / Ketone: 160  / Bili: NEGATIVE / Urobili: 0.2   Blood: NEGATIVE / Protein: NEGATIVE / Nitrite: NEGATIVE   Leuk Esterase: NEGATIVE / RBC: x / WBC x   Sq Epi: x / Non Sq Epi: x / Bacteria: x    Venous Blood Gas:   @ 18:36  7.26/53/34/20/55.7  VBG Lactate: 2.7  Venous Blood Gas:   @ 17:18  7.35/49/31/24/50.3  VBG Lactate: 2.4    MICROBIOLOGY: UCx pending in lab     RADIOLOGY:  [x] Reviewed and interpreted by me    EKG: NSR NAD H080s  QRS86  TWI III,AVF.

## 2018-12-22 NOTE — ED ADULT NURSE REASSESSMENT NOTE - NS ED NURSE REASSESS COMMENT FT1
Report received from MYRANDA Do. Pt appears in NAD and requesting cereal. pt states he still has chest pain but is unable to describe details.  Pt denies SOB, difficulty breathing, N&V. repeat EKG in process. Repeat FS in process. Report received from MYRANDA Do. Pt appears in NAD and requesting cereal. pt states he still has chest pain but is unable to describe details.  Pt denies SOB, difficulty breathing, N&V, ABD pain, changes in vision. repeat EKG in process. Repeat FS in process.

## 2018-12-22 NOTE — ED ADULT NURSE NOTE - NSIMPLEMENTINTERV_GEN_ALL_ED
Implemented All Universal Safety Interventions:  [place] to call system. Call bell, personal items and telephone within reach. Instruct patient to call for assistance. Room bathroom lighting operational. Non-slip footwear when patient is off stretcher. Physically safe environment: no spills, clutter or unnecessary equipment. Stretcher in lowest position, wheels locked, appropriate side rails in place.

## 2018-12-22 NOTE — ED ADULT NURSE NOTE - OBJECTIVE STATEMENT
28 y male A+Ox2 with MR presents to the ER with the complaint of CP. Pt is a poor historian and no family member or aide at bedside. States his sister called 911 and he was coming from home because of CP. Due to mental status, pt unable to give any medical hx. States his chest hurts. NSR noted on cardiac monitor. PT noted to have elevated FS on triage. 18 g in left ac, labs drawn and sent, pt placed on cardiac monitor. Will continue to monitor. 28 y male A+Ox2 with MR presents to the ER with the complaint of CP. Pt is a poor historian and no family member or aide at bedside. States his sister called 911 and he was coming from home because of CP. Due to mental status, pt unable to give any medical hx. States his chest hurts. NSR noted on cardiac monitor. PT noted to have elevated FS on triage. 18 g in left ac, labs drawn and sent, pt placed on cardiac monitor. Will continue to monitor.  Received report from PM RN at 8am this morning. Patient is alert and oriented times three and has been evaluated by medicine, and cardiology. Patient is receiving Potassium as ordered and is on a Heparin drip that was restarted at 7am at 700units/7ml hour/ACS Nomogram. Patient is being a little difficult when it comes to eating and demanding food that he really is not supposed to eat. Patient has 3 IVLs in place. (Right and left AC 18g and Right Hand 20 gauge). Pt is no longer on an insulin drip and FS have been changed to every 4 hours. Patient is being monitored and is NSR on cardiac monitor. Waiting for bed assignment and further orders and disposition. Next PTT is due at 12 noon.     MYRANDA Gallegos

## 2018-12-22 NOTE — CONSULT NOTE ADULT - ASSESSMENT
28 year old man history of uncontrolled T2DM on oral Metformin, intellectual disability, bipolar d/o, schizophrenia presents to the ED on 12/22 with complaints of chest pain. MICU consulted for DKA.     DKA, likely mild DKA.   Likely in setting of poorly uncontrolled DM and non-compliance with diet. r/o infectious source. UA negative. CXR clear.   Labs at 18:36 HCO3 15 AG 15 -400s.   s/p Humalog 5U. On Insulin gtt.   s/p 2L NS Bolus.   Recommend additional IV. 2L LR Bolus now then start maintenance Continue Insulin gtt to titration protocol. Repeat  VBG, BMP 1 hour after after IV bolus.   Will continue to follow.     Emmie Omalley MD  Internal Medicine, PGY3  Pager [phone] 28 year old man history of uncontrolled T2DM on oral Metformin, intellectual disability, bipolar d/o, schizophrenia presents to the ED on 12/22 with complaints of chest pain. MICU consulted for DKA.     DKA, likely mild DKA.   Likely in setting of poorly uncontrolled DM and non-compliance with diet. r/o infectious source. UA negative. CXR clear.   Labs at 18:36 HCO3 15 AG 15 -400s.   s/p Humalog 5U. On Insulin gtt.   s/p 2L NS Bolus.   Recommend additional IV. 2L LR Bolus now then start maintenance Continue Insulin gtt to titration protocol. Repeat  VBG, BMP 1 hour after after IV bolus.   Will continue to follow.       Addendum  AG now 15 ph 7.3. Transitioned to Lantus 10u x1 per ED. Titrating off insulin gtt.    Course c/b NSTEMI s/p ASA, Brilinta load. On Heparin gtt. Cardiology following.     Currently not a candidate for MICU.     Emmie Omalley MD  Internal Medicine, PGY3  Pager [phone]

## 2018-12-22 NOTE — ED PROVIDER NOTE - CARE PLAN
Principal Discharge DX:	ACS (acute coronary syndrome)  Goal:	Admit  Secondary Diagnosis:	DKA (diabetic ketoacidoses)

## 2018-12-22 NOTE — ED ADULT TRIAGE NOTE - CHIEF COMPLAINT QUOTE
pt MR, c/o Chest pain. given asa and 2 nitro sprays by EMS. pt arrives with no family. on 2L O2 by EMS.  f/s [phone]

## 2018-12-22 NOTE — ED PROVIDER NOTE - OBJECTIVE STATEMENT
27 y/o M with h/o MR, schizophrenia, bipolar, htn p/w chest pain for 2 hrs which is crushign in nature and substernal.Pt denies nay fever, chills, cough and states that pain is sharp. No fall or trauma or rash reported

## 2018-12-22 NOTE — ED PROVIDER NOTE - PROGRESS NOTE DETAILS
Patient on insulin gtt and MICU coming to eval. Trops trending up and will repeat and starting on heparin gtt.  -Jarret Hackett PGY4 EMIM Spectra#[id number] Repeat trops 915 concern for NSTEMI (EKG changes in inferior leads resolved. CCU called and will come eval patient. Given Aspirin  -Jarret Hackett PGY4 EMIM Spectra#[id number] Patient seen by CCU and think not NSTEMI and in setting of demand and would like repeat trop prior to loading.   -Jarret Hackett PGY4 EMIM Spectra#[id number] Repeat trop 1692 and concern for NSTEMI will load with [place]. Spoke to MICU and will speak to CCU. Spoke to CCU NP and will discuss case with fellow. FS trending down started on D5 1/2NS and will repeat labs at 420am for Anion Gap and will eval.  -Jarret Hackett PGY4 EMIM Spectra#[id number] Spoke to CCu NP and no role for cath will get echo in AM and see with cardiology fellow. No role for CCU. Spoke to MICu and recommended to start lantus and stop insulin gtt after bridge. Spoke to hospitalist and will admit to tele with Cards following. text Paged MAR.   -Jarret Hackett PGY4 EMIM Spectra#[id number]

## 2018-12-22 NOTE — CONSULT NOTE ADULT - ATTENDING COMMENTS
DKA and NSTEMI  IVF, Insulin gtt, serial labs and VBG  CE, TTE, cards  ASA, Plavix, Heparin gtt  tele once insuilin gtt is titrated off

## 2018-12-22 NOTE — ED ADULT NURSE REASSESSMENT NOTE - NS ED NURSE REASSESS COMMENT FT1
On reassessment, MICU MD at bedside. family is now present at bedside.  Pt noted to be tachycardic and slightly tachypneic, however appears in NAD with no signs of respiratory distress noted.   2nd line: 18G IV placed in RAC. pt able to void 675ml of clear, yellow urine. Will continue to monitor.

## 2018-12-23 DIAGNOSIS — R07.9 CHEST PAIN, UNSPECIFIED: ICD-10-CM

## 2018-12-23 DIAGNOSIS — I24.9 ACUTE ISCHEMIC HEART DISEASE, UNSPECIFIED: ICD-10-CM

## 2018-12-23 DIAGNOSIS — F25.0 SCHIZOAFFECTIVE DISORDER, BIPOLAR TYPE: ICD-10-CM

## 2018-12-23 DIAGNOSIS — Z29.9 ENCOUNTER FOR PROPHYLACTIC MEASURES, UNSPECIFIED: ICD-10-CM

## 2018-12-23 DIAGNOSIS — E13.10 OTHER SPECIFIED DIABETES MELLITUS WITH KETOACIDOSIS WITHOUT COMA: ICD-10-CM

## 2018-12-23 LAB
ALBUMIN SERPL ELPH-MCNC: 4 G/DL — SIGNIFICANT CHANGE UP (ref 3.3–5)
ALP SERPL-CCNC: 66 U/L — SIGNIFICANT CHANGE UP (ref 40–120)
ALT FLD-CCNC: 12 U/L — SIGNIFICANT CHANGE UP (ref 4–41)
APTT BLD: 33 SEC — SIGNIFICANT CHANGE UP (ref 27.5–36.3)
APTT BLD: 71.2 SEC — HIGH (ref 27.5–36.3)
APTT BLD: 71.2 SEC — HIGH (ref 27.5–36.3)
APTT BLD: 96.4 SEC — HIGH (ref 27.5–36.3)
AST SERPL-CCNC: 68 U/L — HIGH (ref 4–40)
BASE EXCESS BLDV CALC-SCNC: -0.2 MMOL/L — SIGNIFICANT CHANGE UP
BASE EXCESS BLDV CALC-SCNC: 1.3 MMOL/L — SIGNIFICANT CHANGE UP
BILIRUB SERPL-MCNC: 0.3 MG/DL — SIGNIFICANT CHANGE UP (ref 0.2–1.2)
BLOOD GAS VENOUS - CREATININE: 0.49 MG/DL — LOW (ref 0.5–1.3)
BLOOD GAS VENOUS - CREATININE: 0.58 MG/DL — SIGNIFICANT CHANGE UP (ref 0.5–1.3)
BUN SERPL-MCNC: 8 MG/DL — SIGNIFICANT CHANGE UP (ref 7–23)
BUN SERPL-MCNC: 9 MG/DL — SIGNIFICANT CHANGE UP (ref 7–23)
CALCIUM SERPL-MCNC: 9 MG/DL — SIGNIFICANT CHANGE UP (ref 8.4–10.5)
CALCIUM SERPL-MCNC: 9 MG/DL — SIGNIFICANT CHANGE UP (ref 8.4–10.5)
CHLORIDE BLDV-SCNC: 108 MMOL/L — SIGNIFICANT CHANGE UP (ref 96–108)
CHLORIDE BLDV-SCNC: 109 MMOL/L — HIGH (ref 96–108)
CHLORIDE SERPL-SCNC: 101 MMOL/L — SIGNIFICANT CHANGE UP (ref 98–107)
CHLORIDE SERPL-SCNC: 102 MMOL/L — SIGNIFICANT CHANGE UP (ref 98–107)
CK MB BLD-MCNC: 35.27 NG/ML — HIGH (ref 1–6.6)
CK MB BLD-MCNC: 44.05 NG/ML — HIGH (ref 1–6.6)
CK MB BLD-MCNC: 6.7 — HIGH (ref 0–2.5)
CK MB BLD-MCNC: 7.1 — HIGH (ref 0–2.5)
CK SERPL-CCNC: 500 U/L — HIGH (ref 30–200)
CK SERPL-CCNC: 655 U/L — HIGH (ref 30–200)
CO2 SERPL-SCNC: 21 MMOL/L — LOW (ref 22–31)
CO2 SERPL-SCNC: 21 MMOL/L — LOW (ref 22–31)
CREAT SERPL-MCNC: 0.49 MG/DL — LOW (ref 0.5–1.3)
CREAT SERPL-MCNC: 0.6 MG/DL — SIGNIFICANT CHANGE UP (ref 0.5–1.3)
GAS PNL BLDV: 134 MMOL/L — LOW (ref 136–146)
GAS PNL BLDV: 134 MMOL/L — LOW (ref 136–146)
GLUCOSE BLDV-MCNC: 115 — HIGH (ref 70–99)
GLUCOSE BLDV-MCNC: 349 — HIGH (ref 70–99)
GLUCOSE SERPL-MCNC: 116 MG/DL — HIGH (ref 70–99)
GLUCOSE SERPL-MCNC: 362 MG/DL — HIGH (ref 70–99)
HCO3 BLDV-SCNC: 21 MMOL/L — SIGNIFICANT CHANGE UP (ref 20–27)
HCO3 BLDV-SCNC: 25 MMOL/L — SIGNIFICANT CHANGE UP (ref 20–27)
HCT VFR BLD CALC: 43.5 % — SIGNIFICANT CHANGE UP (ref 39–50)
HCT VFR BLD CALC: 46.1 % — SIGNIFICANT CHANGE UP (ref 39–50)
HCT VFR BLDV CALC: 45.2 % — SIGNIFICANT CHANGE UP (ref 39–51)
HCT VFR BLDV CALC: 46.3 % — SIGNIFICANT CHANGE UP (ref 39–51)
HGB BLD-MCNC: 14.3 G/DL — SIGNIFICANT CHANGE UP (ref 13–17)
HGB BLD-MCNC: 15.2 G/DL — SIGNIFICANT CHANGE UP (ref 13–17)
HGB BLDV-MCNC: 14.7 G/DL — SIGNIFICANT CHANGE UP (ref 13–17)
HGB BLDV-MCNC: 15.1 G/DL — SIGNIFICANT CHANGE UP (ref 13–17)
INR BLD: 1.06 — SIGNIFICANT CHANGE UP (ref 0.88–1.17)
LACTATE BLDV-MCNC: 1.4 MMOL/L — SIGNIFICANT CHANGE UP (ref 0.5–2)
LACTATE BLDV-MCNC: 3.1 MMOL/L — HIGH (ref 0.5–2)
MCHC RBC-ENTMCNC: 27.2 PG — SIGNIFICANT CHANGE UP (ref 27–34)
MCHC RBC-ENTMCNC: 27.7 PG — SIGNIFICANT CHANGE UP (ref 27–34)
MCHC RBC-ENTMCNC: 32.9 % — SIGNIFICANT CHANGE UP (ref 32–36)
MCHC RBC-ENTMCNC: 33 % — SIGNIFICANT CHANGE UP (ref 32–36)
MCV RBC AUTO: 82.7 FL — SIGNIFICANT CHANGE UP (ref 80–100)
MCV RBC AUTO: 84.1 FL — SIGNIFICANT CHANGE UP (ref 80–100)
NRBC # FLD: 0 — SIGNIFICANT CHANGE UP
NRBC # FLD: 0 — SIGNIFICANT CHANGE UP
PCO2 BLDV: 47 MMHG — SIGNIFICANT CHANGE UP (ref 41–51)
PCO2 BLDV: 57 MMHG — HIGH (ref 41–51)
PH BLDV: 7.28 PH — LOW (ref 7.32–7.43)
PH BLDV: 7.37 PH — SIGNIFICANT CHANGE UP (ref 7.32–7.43)
PLATELET # BLD AUTO: 249 K/UL — SIGNIFICANT CHANGE UP (ref 150–400)
PLATELET # BLD AUTO: 272 K/UL — SIGNIFICANT CHANGE UP (ref 150–400)
PMV BLD: 10.3 FL — SIGNIFICANT CHANGE UP (ref 7–13)
PMV BLD: 10.9 FL — SIGNIFICANT CHANGE UP (ref 7–13)
PO2 BLDV: 48 MMHG — HIGH (ref 35–40)
PO2 BLDV: < 24 MMHG — LOW (ref 35–40)
POTASSIUM BLDV-SCNC: 3.3 MMOL/L — LOW (ref 3.4–4.5)
POTASSIUM BLDV-SCNC: 3.8 MMOL/L — SIGNIFICANT CHANGE UP (ref 3.4–4.5)
POTASSIUM SERPL-MCNC: 3.5 MMOL/L — SIGNIFICANT CHANGE UP (ref 3.5–5.3)
POTASSIUM SERPL-MCNC: 4 MMOL/L — SIGNIFICANT CHANGE UP (ref 3.5–5.3)
POTASSIUM SERPL-SCNC: 3.5 MMOL/L — SIGNIFICANT CHANGE UP (ref 3.5–5.3)
POTASSIUM SERPL-SCNC: 4 MMOL/L — SIGNIFICANT CHANGE UP (ref 3.5–5.3)
PROT SERPL-MCNC: 6.9 G/DL — SIGNIFICANT CHANGE UP (ref 6–8.3)
PROTHROM AB SERPL-ACNC: 12.1 SEC — SIGNIFICANT CHANGE UP (ref 9.8–13.1)
RBC # BLD: 5.26 M/UL — SIGNIFICANT CHANGE UP (ref 4.2–5.8)
RBC # BLD: 5.48 M/UL — SIGNIFICANT CHANGE UP (ref 4.2–5.8)
RBC # FLD: 12.6 % — SIGNIFICANT CHANGE UP (ref 10.3–14.5)
RBC # FLD: 12.6 % — SIGNIFICANT CHANGE UP (ref 10.3–14.5)
SAO2 % BLDV: 27.4 % — LOW (ref 60–85)
SAO2 % BLDV: 81 % — SIGNIFICANT CHANGE UP (ref 60–85)
SODIUM SERPL-SCNC: 137 MMOL/L — SIGNIFICANT CHANGE UP (ref 135–145)
SODIUM SERPL-SCNC: 138 MMOL/L — SIGNIFICANT CHANGE UP (ref 135–145)
TROPONIN T, HIGH SENSITIVITY: 1506 NG/L — CRITICAL HIGH (ref ?–14)
TROPONIN T, HIGH SENSITIVITY: 915 NG/L — CRITICAL HIGH (ref ?–14)
WBC # BLD: 10.44 K/UL — SIGNIFICANT CHANGE UP (ref 3.8–10.5)
WBC # BLD: 10.99 K/UL — HIGH (ref 3.8–10.5)
WBC # FLD AUTO: 10.44 K/UL — SIGNIFICANT CHANGE UP (ref 3.8–10.5)
WBC # FLD AUTO: 10.99 K/UL — HIGH (ref 3.8–10.5)

## 2018-12-23 PROCEDURE — 99223 1ST HOSP IP/OBS HIGH 75: CPT | Mod: GC

## 2018-12-23 PROCEDURE — 93306 TTE W/DOPPLER COMPLETE: CPT | Mod: 26

## 2018-12-23 PROCEDURE — 99223 1ST HOSP IP/OBS HIGH 75: CPT

## 2018-12-23 PROCEDURE — 99291 CRITICAL CARE FIRST HOUR: CPT

## 2018-12-23 RX ORDER — INSULIN GLARGINE 100 [IU]/ML
10 INJECTION, SOLUTION SUBCUTANEOUS ONCE
Qty: 0 | Refills: 0 | Status: COMPLETED | OUTPATIENT
Start: 2018-12-23 | End: 2018-12-23

## 2018-12-23 RX ORDER — HALOPERIDOL DECANOATE 100 MG/ML
20 INJECTION INTRAMUSCULAR AT BEDTIME
Qty: 0 | Refills: 0 | Status: DISCONTINUED | OUTPATIENT
Start: 2018-12-23 | End: 2019-01-08

## 2018-12-23 RX ORDER — POTASSIUM CHLORIDE 20 MEQ
10 PACKET (EA) ORAL
Qty: 0 | Refills: 0 | Status: COMPLETED | OUTPATIENT
Start: 2018-12-23 | End: 2018-12-23

## 2018-12-23 RX ORDER — DEXTROSE 50 % IN WATER 50 %
15 SYRINGE (ML) INTRAVENOUS ONCE
Qty: 0 | Refills: 0 | Status: DISCONTINUED | OUTPATIENT
Start: 2018-12-23 | End: 2019-01-08

## 2018-12-23 RX ORDER — HEPARIN SODIUM 5000 [USP'U]/ML
700 INJECTION INTRAVENOUS; SUBCUTANEOUS
Qty: 25000 | Refills: 0 | Status: DISCONTINUED | OUTPATIENT
Start: 2018-12-23 | End: 2018-12-23

## 2018-12-23 RX ORDER — DEXTROSE 50 % IN WATER 50 %
12.5 SYRINGE (ML) INTRAVENOUS ONCE
Qty: 0 | Refills: 0 | Status: DISCONTINUED | OUTPATIENT
Start: 2018-12-23 | End: 2019-01-08

## 2018-12-23 RX ORDER — DIPHENHYDRAMINE HCL 50 MG
25 CAPSULE ORAL ONCE
Qty: 0 | Refills: 0 | Status: COMPLETED | OUTPATIENT
Start: 2018-12-23 | End: 2018-12-23

## 2018-12-23 RX ORDER — DEXTROSE 50 % IN WATER 50 %
25 SYRINGE (ML) INTRAVENOUS ONCE
Qty: 0 | Refills: 0 | Status: DISCONTINUED | OUTPATIENT
Start: 2018-12-23 | End: 2019-01-08

## 2018-12-23 RX ORDER — TICAGRELOR 90 MG/1
180 TABLET ORAL ONCE
Qty: 0 | Refills: 0 | Status: COMPLETED | OUTPATIENT
Start: 2018-12-23 | End: 2018-12-23

## 2018-12-23 RX ORDER — INSULIN LISPRO 100/ML
VIAL (ML) SUBCUTANEOUS
Qty: 0 | Refills: 0 | Status: DISCONTINUED | OUTPATIENT
Start: 2018-12-23 | End: 2018-12-28

## 2018-12-23 RX ORDER — HEPARIN SODIUM 5000 [USP'U]/ML
INJECTION INTRAVENOUS; SUBCUTANEOUS
Qty: 25000 | Refills: 0 | Status: DISCONTINUED | OUTPATIENT
Start: 2018-12-23 | End: 2018-12-25

## 2018-12-23 RX ORDER — SODIUM CHLORIDE 9 MG/ML
1000 INJECTION, SOLUTION INTRAVENOUS
Qty: 0 | Refills: 0 | Status: DISCONTINUED | OUTPATIENT
Start: 2018-12-23 | End: 2018-12-23

## 2018-12-23 RX ORDER — HALOPERIDOL DECANOATE 100 MG/ML
5 INJECTION INTRAMUSCULAR ONCE
Qty: 0 | Refills: 0 | Status: DISCONTINUED | OUTPATIENT
Start: 2018-12-23 | End: 2018-12-28

## 2018-12-23 RX ORDER — OLANZAPINE 15 MG/1
5 TABLET, FILM COATED ORAL ONCE
Qty: 0 | Refills: 0 | Status: COMPLETED | OUTPATIENT
Start: 2018-12-23 | End: 2018-12-23

## 2018-12-23 RX ORDER — GLUCAGON INJECTION, SOLUTION 0.5 MG/.1ML
1 INJECTION, SOLUTION SUBCUTANEOUS ONCE
Qty: 0 | Refills: 0 | Status: DISCONTINUED | OUTPATIENT
Start: 2018-12-23 | End: 2019-01-08

## 2018-12-23 RX ORDER — INSULIN LISPRO 100/ML
VIAL (ML) SUBCUTANEOUS AT BEDTIME
Qty: 0 | Refills: 0 | Status: DISCONTINUED | OUTPATIENT
Start: 2018-12-23 | End: 2019-01-07

## 2018-12-23 RX ORDER — POTASSIUM CHLORIDE 20 MEQ
40 PACKET (EA) ORAL EVERY 4 HOURS
Qty: 0 | Refills: 0 | Status: COMPLETED | OUTPATIENT
Start: 2018-12-23 | End: 2018-12-23

## 2018-12-23 RX ORDER — TICAGRELOR 90 MG/1
90 TABLET ORAL
Qty: 0 | Refills: 0 | Status: DISCONTINUED | OUTPATIENT
Start: 2018-12-24 | End: 2018-12-26

## 2018-12-23 RX ORDER — BENZTROPINE MESYLATE 1 MG
1 TABLET ORAL
Qty: 0 | Refills: 0 | Status: DISCONTINUED | OUTPATIENT
Start: 2018-12-23 | End: 2019-01-08

## 2018-12-23 RX ORDER — ASPIRIN/CALCIUM CARB/MAGNESIUM 324 MG
324 TABLET ORAL ONCE
Qty: 0 | Refills: 0 | Status: COMPLETED | OUTPATIENT
Start: 2018-12-23 | End: 2018-12-23

## 2018-12-23 RX ORDER — ASPIRIN/CALCIUM CARB/MAGNESIUM 324 MG
81 TABLET ORAL DAILY
Qty: 0 | Refills: 0 | Status: DISCONTINUED | OUTPATIENT
Start: 2018-12-24 | End: 2019-01-08

## 2018-12-23 RX ORDER — ATORVASTATIN CALCIUM 80 MG/1
80 TABLET, FILM COATED ORAL AT BEDTIME
Qty: 0 | Refills: 0 | Status: DISCONTINUED | OUTPATIENT
Start: 2018-12-23 | End: 2019-01-08

## 2018-12-23 RX ORDER — INSULIN GLARGINE 100 [IU]/ML
10 INJECTION, SOLUTION SUBCUTANEOUS AT BEDTIME
Qty: 0 | Refills: 0 | Status: DISCONTINUED | OUTPATIENT
Start: 2018-12-23 | End: 2018-12-24

## 2018-12-23 RX ORDER — SODIUM CHLORIDE 9 MG/ML
1000 INJECTION, SOLUTION INTRAVENOUS
Qty: 0 | Refills: 0 | Status: DISCONTINUED | OUTPATIENT
Start: 2018-12-23 | End: 2019-01-08

## 2018-12-23 RX ORDER — HALOPERIDOL DECANOATE 100 MG/ML
5 INJECTION INTRAMUSCULAR ONCE
Qty: 0 | Refills: 0 | Status: DISCONTINUED | OUTPATIENT
Start: 2018-12-23 | End: 2018-12-23

## 2018-12-23 RX ORDER — METOPROLOL TARTRATE 50 MG
25 TABLET ORAL
Qty: 0 | Refills: 0 | Status: DISCONTINUED | OUTPATIENT
Start: 2018-12-23 | End: 2018-12-26

## 2018-12-23 RX ORDER — DIVALPROEX SODIUM 500 MG/1
500 TABLET, DELAYED RELEASE ORAL
Qty: 0 | Refills: 0 | Status: DISCONTINUED | OUTPATIENT
Start: 2018-12-23 | End: 2018-12-28

## 2018-12-23 RX ADMIN — ATORVASTATIN CALCIUM 80 MILLIGRAM(S): 80 TABLET, FILM COATED ORAL at 21:13

## 2018-12-23 RX ADMIN — OLANZAPINE 5 MILLIGRAM(S): 15 TABLET, FILM COATED ORAL at 15:09

## 2018-12-23 RX ADMIN — Medication 100 MILLIEQUIVALENT(S): at 10:05

## 2018-12-23 RX ADMIN — Medication 2 MILLIGRAM(S): at 13:30

## 2018-12-23 RX ADMIN — HEPARIN SODIUM 1000 UNIT(S)/HR: 5000 INJECTION INTRAVENOUS; SUBCUTANEOUS at 20:02

## 2018-12-23 RX ADMIN — Medication 2 MILLIGRAM(S): at 18:58

## 2018-12-23 RX ADMIN — Medication 4: at 21:41

## 2018-12-23 RX ADMIN — DIVALPROEX SODIUM 500 MILLIGRAM(S): 500 TABLET, DELAYED RELEASE ORAL at 21:13

## 2018-12-23 RX ADMIN — SODIUM CHLORIDE 1000 MILLILITER(S): 9 INJECTION, SOLUTION INTRAVENOUS at 05:46

## 2018-12-23 RX ADMIN — Medication 100 MILLIEQUIVALENT(S): at 07:59

## 2018-12-23 RX ADMIN — INSULIN HUMAN 3 UNIT(S)/HR: 100 INJECTION, SOLUTION SUBCUTANEOUS at 05:20

## 2018-12-23 RX ADMIN — Medication 1 MILLIGRAM(S): at 21:13

## 2018-12-23 RX ADMIN — INSULIN HUMAN 3 UNIT(S)/HR: 100 INJECTION, SOLUTION SUBCUTANEOUS at 05:19

## 2018-12-23 RX ADMIN — Medication 324 MILLIGRAM(S): at 03:17

## 2018-12-23 RX ADMIN — HEPARIN SODIUM UNIT(S)/HR: 5000 INJECTION INTRAVENOUS; SUBCUTANEOUS at 06:00

## 2018-12-23 RX ADMIN — HEPARIN SODIUM 1000 UNIT(S)/HR: 5000 INJECTION INTRAVENOUS; SUBCUTANEOUS at 00:21

## 2018-12-23 RX ADMIN — HEPARIN SODIUM 700 UNIT(S)/HR: 5000 INJECTION INTRAVENOUS; SUBCUTANEOUS at 09:52

## 2018-12-23 RX ADMIN — Medication 40 MILLIEQUIVALENT(S): at 07:36

## 2018-12-23 RX ADMIN — SODIUM CHLORIDE 100 MILLILITER(S): 9 INJECTION, SOLUTION INTRAVENOUS at 03:17

## 2018-12-23 RX ADMIN — Medication 8: at 12:56

## 2018-12-23 RX ADMIN — Medication 40 MILLIEQUIVALENT(S): at 12:57

## 2018-12-23 RX ADMIN — Medication 25 MILLIGRAM(S): at 18:58

## 2018-12-23 RX ADMIN — HEPARIN SODIUM 5000 UNIT(S): 5000 INJECTION INTRAVENOUS; SUBCUTANEOUS at 00:17

## 2018-12-23 RX ADMIN — HEPARIN SODIUM 1000 UNIT(S)/HR: 5000 INJECTION INTRAVENOUS; SUBCUTANEOUS at 12:22

## 2018-12-23 RX ADMIN — Medication 100 MILLIEQUIVALENT(S): at 07:37

## 2018-12-23 RX ADMIN — INSULIN GLARGINE 10 UNIT(S): 100 INJECTION, SOLUTION SUBCUTANEOUS at 05:49

## 2018-12-23 RX ADMIN — SODIUM CHLORIDE 150 MILLILITER(S): 9 INJECTION, SOLUTION INTRAVENOUS at 05:46

## 2018-12-23 RX ADMIN — TICAGRELOR 180 MILLIGRAM(S): 90 TABLET ORAL at 04:03

## 2018-12-23 RX ADMIN — INSULIN HUMAN 3 UNIT(S)/HR: 100 INJECTION, SOLUTION SUBCUTANEOUS at 07:00

## 2018-12-23 RX ADMIN — Medication 25 MILLIGRAM(S): at 13:30

## 2018-12-23 RX ADMIN — INSULIN GLARGINE 10 UNIT(S): 100 INJECTION, SOLUTION SUBCUTANEOUS at 21:41

## 2018-12-23 RX ADMIN — HALOPERIDOL DECANOATE 20 MILLIGRAM(S): 100 INJECTION INTRAMUSCULAR at 21:13

## 2018-12-23 RX ADMIN — SODIUM CHLORIDE 2000 MILLILITER(S): 9 INJECTION INTRAMUSCULAR; INTRAVENOUS; SUBCUTANEOUS at 00:20

## 2018-12-23 NOTE — H&P ADULT - ASSESSMENT
28 year old man history of uncontrolled T2DM on oral Metformin, intellectual disability, bipolar d/o, schizophrenia presents to the ED on 12/22 with complaints of chest pain with concern for NSTEMI vs demand ischemia. Also presented with NBNB; found to be with elevated glucose with concern for mild DKA; now transitioned off insulin drip. 28 year old man history of uncontrolled T2DM on oral Metformin, intellectual disability, bipolar d/o, schizophrenia presents to the ED on 12/22 with complaints of chest pain with concern for NSTEMI vs myositis also considering demand ischemia. Also presented with NBNB; found to be with elevated glucose with concern for mild DKA; now transitioned off insulin drip.

## 2018-12-23 NOTE — H&P ADULT - NSHPSOCIALHISTORY_GEN_ALL_CORE
Smoking Hx: Current Smoker, 1 cigarette per day  Alcohol: [employer] U[employer] beers unknown amount   Denied illicit drug use

## 2018-12-23 NOTE — BEHAVIORAL HEALTH ASSESSMENT NOTE - CASE SUMMARY
28 year-old with MR, DM, being admitted for MATT, with hx of IED, consulted for agitated and disorganized behaviors in the ED in the absence of self injury or psychosis   Recommend 1:1 and standing and prn meds as above

## 2018-12-23 NOTE — H&P ADULT - PROBLEM SELECTOR PLAN 1
Acute onset; Cardiac enzymes were elevated with HS trop 913, , 35.27. EKG w/ NSR and no LISETTE  As per cards; likely demand ischemia in the setting of DKA  -S/P Aspirin an Brilinta load   -c/w Asp/ Brilinta  -trend trops  -Cardiology consulted, recs appreciated; Acute onset; Cardiac enzymes were elevated with HS trop 913, , 35.27. EKG w/ NSR and no LISETTE  As per cards; likely demand ischemia in the setting of DKA  -S/P Aspirin an Brilinta load   -c/w Asp/ Brilinta  -c/w heparin drip  -trend trops  -Cardiology consulted, recs appreciated; Acute onset; Cardiac enzymes were elevated with HS trop 913, , 35.27. EKG w/ NSR and no LISETTE  As per cards; likely demand ischemia in the setting of DKA  -S/P Aspirin an Brilinta load   -c/w Asp/ Brilinta  -c/w heparin drip  -trend trops  -c/w Atorvastatin  -Cardiology consulted, recs appreciated; Acute onset; Cardiac enzymes were elevated with HS trop 913, , 35.27. EKG w/ NSR and no LISETTE  As per cards; likely demand ischemia in the setting of DKA  -S/P Aspirin an Brilinta load   -c/w Asp/ Brilinta  -c/w heparin drip  -trend trops  -c/w Atorvastatin  -F/U TTE  -Cardiology consulted, recs appreciated; Acute onset; Cardiac enzymes were elevated with HS trop 913, , 35.27. EKG w/ NSR and no LISETTE  As per cards; likely demand ischemia in the setting of DKA  -S/P Aspirin an Brilinta load   -c/w Asp/ Brilinta/ Lopressor  -c/w heparin drip  -trend trops  -c/w Atorvastatin  -F/U TTE  -Cardiology consulted, recs appreciated; Acute onset; Cardiac enzymes were elevated with HS trop 913, , 35.27. EKG w/ NSR and no LISETTE  As per cards; likely demand ischemia in the setting of DKA  -S/P Aspirin an Brilinta load   -c/w Asp/ Brilinta/ Lopressor  -c/w heparin drip  -trend trops  -c/w Atorvastatin  -F/U TTE  -Cardiology consulted, recs appreciated;  ----plan for cardiac cath...Monday?(non-urgent)

## 2018-12-23 NOTE — CONSULT NOTE ADULT - ATTENDING COMMENTS
Patient seen and examined  Agree with above assessment and plan  Check TTE  Continue heparin gtt  Cont ASA and Plavix with statin  Continue to trend cardiac enzymes Patient seen and examined  Agree with above assessment and plan.  Asymptomatic without chest pain. Resting comfortably  Check TTE  Continue heparin gtt  Cont ASA and Brilinta with statin  Continue to trend cardiac enzymes

## 2018-12-23 NOTE — BEHAVIORAL HEALTH ASSESSMENT NOTE - RISK ASSESSMENT
Risk factors: Single, male, intellectual disability, chronic medical illness, poor reactivity to stressors.     Protective factors: no hx of prior attempts, supportive social network or family, medication/follow up compliance, no active substance use, outpatient follow up.     Based on risk assessment evaluation, Pt does not appear to be at imminent risk of harm to self or others at this time.

## 2018-12-23 NOTE — ED ADULT NURSE REASSESSMENT NOTE - NS ED NURSE REASSESS COMMENT FT1
6am aPTT resulted, aPTT 71.2 and in targetted range as per MD order. Will maintain heparin drip at 7.5ml/hr.

## 2018-12-23 NOTE — H&P ADULT - PROBLEM SELECTOR PLAN 2
Likely in setting of poorly uncontrolled DM and non-compliance with diet. r/o infectious source. UA negative. CXR clear.   s/p Humalog 5U, Insulin gtt, 2L NS Bolus and 2L LR  -AG closing and transitioned of insulin gtt  -BMP q4  -Maintain K >4 Likely in setting of poorly uncontrolled DM and non-compliance with diet. r/o infectious source. UA negative. CXR clear.   s/p Humalog 5U, Insulin gtt, 2L NS Bolus and 2L LR  -AG closing and transitioned of insulin gtt  -BMP q4  -Maintain K >4  -F/U A1C Likely in setting of poorly uncontrolled DM and non-compliance with diet. r/o infectious source. UA negative. CXR clear.   s/p Humalog 5U, Insulin gtt, 2L NS Bolus and 2L LR  -AG closing and transitioned of insulin gtt  -BMP q4  -Maintain K >4  -F/U A1C  -SSI, FS; will give lantus 10 in the AM; will dose premeal and bedtime insulin starting tomorrow (  -Endo consult in the AM

## 2018-12-23 NOTE — CONSULT NOTE ADULT - SUBJECTIVE AND OBJECTIVE BOX
Date of Admission: 12/22/2018    CHIEF COMPLAINT: Chest pain    HISTORY OF PRESENT ILLNESS:  This is a 28yoM w/ PMHx T2DM on oral metformin, intellectual disability, bipolar d/o, schizophrenia p/w severe midsternal chest pain a/w NBNB emesis.  States he has never had chest pain before.  Denies any shortness of breath, palpitations, lightheadedness, dizziness.  States that he is concerned about his blood sugar and endorses noncompliance to his diabetes medications for unknown amount of time.  Patient seen by MICU for mild DKA and was started on an insulin gtt in ED.  Cardiology called for elevated cardiac enzymes, HS trop 913, , 35.27.  During examination, patient states chest pain has resolved.  EKG shows NSR, no LISETTE.      Allergies  No Known Allergies    MEDICATIONS:  aspirin  chewable 362 milliGRAM(s) Oral once  heparin  Infusion.  Unit(s)/Hr IV Continuous <Continuous>  heparin  Injectable 5300 Unit(s) IV Push every 6 hours PRN  insulin Infusion 7 Unit(s)/Hr IV Continuous <Continuous>    PAST MEDICAL & SURGICAL HISTORY:  Schizo-affective schizophrenia  Bipolar 1 disorder  Obesity  Scoliosis  Mental retardation  No significant past surgical history    FAMILY HISTORY:  No pertinent family history in first degree relatives    REVIEW OF SYSTEMS:  See HPI. Otherwise, 10 point ROS done and otherwise negative.    PHYSICAL EXAM:  T(C): 36.6 (12-23-18 @ 01:58), Max: 36.7 (12-22-18 @ 22:23)  HR: 106 (12-23-18 @ 01:58) (87 - 123)  BP: 156/105 (12-23-18 @ 01:58) (145/108 - 172/112)  RR: 20 (12-23-18 @ 01:58) (17 - 28)  SpO2: 99% (12-23-18 @ 01:58) (97% - 100%)  Wt(kg): --  I&O's Summary    22 Dec 2018 07:01  -  23 Dec 2018 02:32  --------------------------------------------------------  IN: 0 mL / OUT: 675 mL / NET: -675 mL        Appearance: Normal	  HEENT:   Normal oral mucosa, PERRL, EOMI	  Lymphatic: No lymphadenopathy  Cardiovascular: Normal S1 S2, No JVD, No murmurs, No edema  Respiratory: Lungs clear to auscultation	  Psychiatry: A & O x 3, Mood & affect appropriate  Gastrointestinal:  Soft, Non-tender, + BS	  Skin: No rashes, No ecchymoses, No cyanosis	  Neurologic: Non-focal  Extremities: Normal range of motion, No clubbing, cyanosis or edema  Vascular: Peripheral pulses palpable 2+ bilaterally    LABS:	 	  CBC Full  -  ( 22 Dec 2018 17:18 )  WBC Count : 13.65 K/uL  Hemoglobin : 15.7 g/dL  Hematocrit : 46.4 %  Platelet Count - Automated : 235 K/uL  Mean Cell Volume : 80.6 fL  Mean Cell Hemoglobin : 27.3 pg  Mean Cell Hemoglobin Concentration : 33.8 %  Auto Neutrophil # : 10.39 K/uL  Auto Lymphocyte # : 2.36 K/uL  Auto Monocyte # : 0.82 K/uL  Auto Eosinophil # : 0.02 K/uL  Auto Basophil # : 0.02 K/uL  Auto Neutrophil % : 76.2 %  Auto Lymphocyte % : 17.3 %  Auto Monocyte % : 6.0 %  Auto Eosinophil % : 0.1 %  Auto Basophil % : 0.1 %    12-23    137  |  101  |  9   ----------------------------<  362<H>  4.0   |  21<L>  |  0.60  12-22    137  |  102  |  8   ----------------------------<  350<H>  3.9   |  15<L>  |  0.54    Ca    9.0      23 Dec 2018 00:31  Ca    7.8<L>      22 Dec 2018 18:36    TPro  6.7  /  Alb  3.8  /  TBili  0.3  /  DBili  x   /  AST  20  /  ALT  11  /  AlkPhos  66  12-22  TPro  8.1  /  Alb  4.7  /  TBili  0.4  /  DBili  x   /  AST  13  /  ALT  11  /  AlkPhos  80  12-22    CARDIAC MARKERS:  CKMB: 35.27 ng/mL (12-23 @ 00:31)  CKMB: 1.89 ng/mL (12-22 @ 17:18)  CKMB Relative Index: 7.1 (12-23 @ 00:31)  CKMB Relative Index: Test not performed (12-22 @ 17:18)

## 2018-12-23 NOTE — ED ADULT NURSE REASSESSMENT NOTE - NS ED NURSE REASSESS COMMENT FT1
As per ACS Nomogram protocol, heparin drip stopped from 6153-6051, and restarted at 7.5ml/hr. awaiting results of second stat aPTT

## 2018-12-23 NOTE — H&P ADULT - ATTENDING COMMENTS
27 yo M with intellectual disability ,schizophrenia, bipolar, uncontrolled DM non-compliant with meds admitted for DKA and NSTEMI    #DKA  -s/p insulin gtt in ER, gap now closed  -s/p lantus 10 units today. will cont insulin lipro ss for now. likely will require lantus 20 tomorrow  -check A1c  -endocrine consult in am    #NSTEMI  chest pain on admission, now resolved. no STT changes on EKG, peak trop 1700  -start asa, brillinta, metoprolol, lipitor, heparin gtt  -cardiology consulted, f/u recs re: cath   -f/u TTE     #bipolar/schizophrenia  -cont home meds Depakote, haloperidol, benztropine   -psych consult in am- family reports current regimen is not sufficient to control behavior

## 2018-12-23 NOTE — H&P ADULT - NSHPLABSRESULTS_GEN_ALL_CORE
Personally reviewed labs.   Personally reviewed imaging.   Personally reviewed EKG.                           14.3   10.99 )-----------( 272      ( 23 Dec 2018 06:00 )             43.5       12    138  |  102  |  8   ----------------------------<  116<H>  3.5   |  21<L>  |  0.49<L>    Ca    9.0      23 Dec 2018 04:22    TPro  6.9  /  Alb  4.0  /  TBili  0.3  /  DBili  x   /  AST  68<H>  /  ALT  12  /  AlkPhos  66  12      CARDIAC MARKERS ( 23 Dec 2018 02:40 )  x     / x     / 655 u/L / 44.05 ng/mL / x      CARDIAC MARKERS ( 23 Dec 2018 00:31 )  x     / x     / 500 u/L / 35.27 ng/mL / x      CARDIAC MARKERS ( 22 Dec 2018 17:18 )  x     / x     / 82 u/L / 1.89 ng/mL / x            LIVER FUNCTIONS - ( 23 Dec 2018 04:22 )  Alb: 4.0 g/dL / Pro: 6.9 g/dL / ALK PHOS: 66 u/L / ALT: 12 u/L / AST: 68 u/L / GGT: x             PT/INR - ( 23 Dec 2018 05:12 )   PT: 12.1 SEC;   INR: 1.06          PTT - ( 23 Dec 2018 06:00 )  PTT:71.2 SEC    Urinalysis Basic - ( 22 Dec 2018 18:36 )    Color: YELLOW / Appearance: CLEAR / S.020 / pH: 6.0  Gluc: 500 / Ketone: 160  / Bili: NEGATIVE / Urobili: 0.2   Blood: NEGATIVE / Protein: NEGATIVE / Nitrite: NEGATIVE   Leuk Esterase: NEGATIVE / RBC: x / WBC x   Sq Epi: x / Non Sq Epi: x / Bacteria: x Personally reviewed labs.   Personally reviewed imaging.   Personally reviewed EKG.                           14.3   10.99 )-----------( 272      ( 23 Dec 2018 06:00 )             43.5       12    138  |  102  |  8   ----------------------------<  116<H>  3.5   |  21<L>  |  0.49<L>    Ca    9.0      23 Dec 2018 04:22    TPro  6.9  /  Alb  4.0  /  TBili  0.3  /  DBili  x   /  AST  68<H>  /  ALT  12  /  AlkPhos  66  12      CARDIAC MARKERS ( 23 Dec 2018 02:40 )  x     / x     / 655 u/L / 44.05 ng/mL / x      CARDIAC MARKERS ( 23 Dec 2018 00:31 )  x     / x     / 500 u/L / 35.27 ng/mL / x      CARDIAC MARKERS ( 22 Dec 2018 17:18 )  x     / x     / 82 u/L / 1.89 ng/mL / x            LIVER FUNCTIONS - ( 23 Dec 2018 04:22 )  Alb: 4.0 g/dL / Pro: 6.9 g/dL / ALK PHOS: 66 u/L / ALT: 12 u/L / AST: 68 u/L / GGT: x           PT/INR - ( 23 Dec 2018 05:12 )   PT: 12.1 SEC;   INR: 1.06     PTT - ( 23 Dec 2018 06:00 )  PTT:71.2 SEC  Urinalysis Basic - ( 22 Dec 2018 18:36 )    Color: YELLOW / Appearance: CLEAR / S.020 / pH: 6.0  Gluc: 500 / Ketone: 160  / Bili: NEGATIVE / Urobili: 0.2   Blood: NEGATIVE / Protein: NEGATIVE / Nitrite: NEGATIVE   Leuk Esterase: NEGATIVE / RBC: x / WBC x   Sq Epi: x / Non Sq Epi: x / Bacteria: x

## 2018-12-23 NOTE — BEHAVIORAL HEALTH ASSESSMENT NOTE - NSBHCHARTREVIEWVS_PSY_A_CORE FT
Vital Signs Last 24 Hrs  T(C): 37.1 (23 Dec 2018 10:22), Max: 37.1 (23 Dec 2018 10:22)  T(F): 98.7 (23 Dec 2018 10:22), Max: 98.7 (23 Dec 2018 10:22)  HR: 87 (23 Dec 2018 10:22) (87 - 123)  BP: 131/91 (23 Dec 2018 10:22) (131/91 - 172/112)  BP(mean): --  RR: 20 (23 Dec 2018 10:22) (17 - 28)  SpO2: 100% (23 Dec 2018 10:22) (97% - 100%)

## 2018-12-23 NOTE — BEHAVIORAL HEALTH ASSESSMENT NOTE - NSBHCONSULTMEDAGITATION_PSY_A_CORE FT
Ativan 2 mg Q6H PRN. If pt continues to be agitated despite ativan PRNs, can give haldol 5 mg IVP Q4H  (max 2 pushes within 24 hrs). Max daily haldol dose 30 mg (PO/IM/IV). Check EKG; hold antipsychotics if QTC>500ms.

## 2018-12-23 NOTE — BEHAVIORAL HEALTH ASSESSMENT NOTE - NSBHCHARTREVIEWLAB_PSY_A_CORE FT
14.3   10.99 )-----------( 272      ( 23 Dec 2018 06:00 )             43.5        12-23    138  |  102  |  8   ----------------------------<  116<H>  3.5   |  21<L>  |  0.49<L>    Ca    9.0      23 Dec 2018 04:22    TPro  6.9  /  Alb  4.0  /  TBili  0.3  /  DBili  x   /  AST  68<H>  /  ALT  12  /  AlkPhos  66  12-23

## 2018-12-23 NOTE — H&P ADULT - PROBLEM SELECTOR PLAN 4
DVT DVT ppx: Heparin drip   Diet: Regular/Consistent Carb DVT ppx: Heparin drip   Diet: Regular/Consistent Carb/DASH/TLC

## 2018-12-23 NOTE — H&P ADULT - HISTORY OF PRESENT ILLNESS
28yoM w/ PMHx uncontrolled T2DM on oral metformin, intellectual disability, bipolar d/o, schizophrenia who presented to the ED on 12/22 with complaints of severe midsternal chest pain a/w NBNB emesis.  Chest pain described as severe non radiating band like sternal pain which started around 3pm on 12/22. Denied having chest pain before. Patient was unable to state how long the pain lasted, however was not experiencing the pain at the time of this encounter.  Denied any associated shortness of breath, palpitations, lightheadedness, dizziness. Patient/sister also had concerns re his blood sugar, and there was concern for DKA while in the ED. Patient endorsed noncompliance to his diabetic medications (for an unknown time period). Patients sister reported that he previously on insulin over a year ago, but it was discontinued due to fear/agitation with needless.    ED Course  Insulin was max 500s in the ED and was started on insulin drip; MICU was consulted. Cardiac enzymes were elevated with HS trop 913, , 35.27. EKG w/ NSR and no LISETTE, cardiology was consulted. 28yoM w/ PMHx uncontrolled T2DM on oral metformin, intellectual disability, bipolar d/o, schizophrenia who presented to the ED on 12/22 with complaints of severe midsternal chest pain a/w NBNB emesis.  Chest pain described as severe non radiating band like sternal pain which started around 3pm on 12/22. Denied having chest pain before. Patient was unable to state how long the pain lasted, however was not experiencing the pain at the time of this encounter.  Denied any associated shortness of breath, palpitations, lightheadedness, dizziness. Patient/sister also had concerns re his blood sugar, and there was concern for DKA while in the ED. Patient endorsed noncompliance to his diabetic medications (for an unknown time period). Patients sister reported that he previously on insulin over a year ago, but it was discontinued due to fear/agitation with needless.    ED Course  Insulin was max 400s in the ED and was started on insulin drip; MICU was consulted. Cardiac enzymes were elevated with HS trop 913, , 35.27. EKG w/ NSR and no LISETTE, cardiology was consulted.

## 2018-12-23 NOTE — ED ADULT NURSE REASSESSMENT NOTE - NS ED NURSE REASSESS COMMENT FT1
aPTT resulted; heparin infusion stopped as per ACS protocol. no signs of bleeding or bruising noted. pt appears in NAD.

## 2018-12-23 NOTE — CONSULT NOTE ADULT - ASSESSMENT
28yoM w/ PMHx T2DM on oral metformin, intellectual disability, bipolar d/o, schizophrenia p/w severe midsternal chest pain a/w NBNB emesis.

## 2018-12-23 NOTE — BEHAVIORAL HEALTH ASSESSMENT NOTE - HPI (INCLUDE ILLNESS QUALITY, SEVERITY, DURATION, TIMING, CONTEXT, MODIFYING FACTORS, ASSOCIATED SIGNS AND SYMPTOMS)
Patient is a 29 yo single never  man of [place] descent with no dependents, domiciled at home with parents who are his caregivers, unemployed, diagnosed with bipolar disorder, Intermittent Explosive Disorder, mental retardation (IQ=40), multiple inpatient psychiatric admissions last to [facility] in Sept 29 2015 to Oct 7 2015, currently in [facility] with NP Nury Pabon; he has a long history of aggression toward family and chronic poor frustration tolerance; no history of suicide, no legal problems, no substance use, no abuse history, PMH DM2 (poorly controlled) admitted for chest pain r/o MI.     Patient seen with CO 1:1 at bedside. Pt perseverative insisting that he wants to cut off his name band. Pt does appears to be disorganized however not aggressive.  He also appears to be somewhat sleepy. Pt reports that he came to the hospital for "coffee".  Per primary team, pt agitated, pulling at lines. Pt received thus far benadryl 25 mg IVP/Ativan 2 mg @ 13:00. Zyprexa 5 mg @ 15:00.     As per CVM patient is taking Propranolol 40mg bid, Divalproex 500mg bid, Haloperidol 20mg qd, Benztropine 1mg bid.

## 2018-12-23 NOTE — H&P ADULT - NSHPPHYSICALEXAM_GEN_ALL_CORE
General:  man in NAD.   HEENT:  PERRL, EOMI	  Cardiovascular: Tachycardic, Normal S1 S2, No JVD, No murmurs appreciated  Respiratory: Lungs clear to auscultation	  Gastrointestinal:  Soft, Non-tender, + BS	  Skin: No rashes, No ecchymoses, No cyanosis	  Extremities: Normal range of motion, No clubbing, cyanosis or edema  Vascular: Peripheral pulses palpable 2+ bilaterally  Psychiatry: A & O x 3, Mood & affect appropriate  Neurological: Alert and oriented. Moving all extremities

## 2018-12-23 NOTE — H&P ADULT - NSHPREVIEWOFSYSTEMS_GEN_ALL_CORE
Constitutional: denies fevers, chills, night sweats, weight loss  HEENT: denies visual changes, hearing changes, rhinitis, odynophagia, or dysphagia  Cardiovascular: +chest pain   Respiratory: denies SOB, wheezing  Gastrointestinal: +ve n/v; -ve abdominal pain   : denies dysuria, hematuria  MSK: denies weakness, joint pain  Neuro: no numbness or tingling  Psych: no depression or anxiety  Skin: denies new rashes or masses

## 2018-12-23 NOTE — CONSULT NOTE ADULT - PROBLEM SELECTOR RECOMMENDATION 9
Elevated cardiac enzymes likely 2/2 demand ischemia in setting of mild DKA   -Repeat cardiac enzymes now   -if increase in cardiac enzymes, load w/ ASA, plavix and start heparin gtt  -c/w insulin gtt and manage DKA   -House cards to follow

## 2018-12-24 DIAGNOSIS — E11.65 TYPE 2 DIABETES MELLITUS WITH HYPERGLYCEMIA: ICD-10-CM

## 2018-12-24 LAB
ALBUMIN SERPL ELPH-MCNC: 3.8 G/DL — SIGNIFICANT CHANGE UP (ref 3.3–5)
ALP SERPL-CCNC: 69 U/L — SIGNIFICANT CHANGE UP (ref 40–120)
ALT FLD-CCNC: 14 U/L — SIGNIFICANT CHANGE UP (ref 4–41)
APTT BLD: 70.8 SEC — HIGH (ref 27.5–36.3)
AST SERPL-CCNC: 29 U/L — SIGNIFICANT CHANGE UP (ref 4–40)
BACTERIA UR CULT: SIGNIFICANT CHANGE UP
BASOPHILS # BLD AUTO: 0.02 K/UL — SIGNIFICANT CHANGE UP (ref 0–0.2)
BASOPHILS NFR BLD AUTO: 0.2 % — SIGNIFICANT CHANGE UP (ref 0–2)
BASOPHILS NFR SPEC: 0 % — SIGNIFICANT CHANGE UP (ref 0–2)
BILIRUB SERPL-MCNC: 0.3 MG/DL — SIGNIFICANT CHANGE UP (ref 0.2–1.2)
BLASTS # FLD: 0 % — SIGNIFICANT CHANGE UP (ref 0–0)
BUN SERPL-MCNC: 7 MG/DL — SIGNIFICANT CHANGE UP (ref 7–23)
BUN SERPL-MCNC: 8 MG/DL — SIGNIFICANT CHANGE UP (ref 7–23)
CALCIUM SERPL-MCNC: 9.3 MG/DL — SIGNIFICANT CHANGE UP (ref 8.4–10.5)
CALCIUM SERPL-MCNC: 9.5 MG/DL — SIGNIFICANT CHANGE UP (ref 8.4–10.5)
CHLORIDE SERPL-SCNC: 102 MMOL/L — SIGNIFICANT CHANGE UP (ref 98–107)
CHLORIDE SERPL-SCNC: 99 MMOL/L — SIGNIFICANT CHANGE UP (ref 98–107)
CO2 SERPL-SCNC: 19 MMOL/L — LOW (ref 22–31)
CO2 SERPL-SCNC: 21 MMOL/L — LOW (ref 22–31)
CREAT SERPL-MCNC: 0.56 MG/DL — SIGNIFICANT CHANGE UP (ref 0.5–1.3)
CREAT SERPL-MCNC: 0.6 MG/DL — SIGNIFICANT CHANGE UP (ref 0.5–1.3)
EOSINOPHIL # BLD AUTO: 0.04 K/UL — SIGNIFICANT CHANGE UP (ref 0–0.5)
EOSINOPHIL NFR BLD AUTO: 0.5 % — SIGNIFICANT CHANGE UP (ref 0–6)
EOSINOPHIL NFR FLD: 0 % — SIGNIFICANT CHANGE UP (ref 0–6)
GLUCOSE BLDC GLUCOMTR-MCNC: 120 MG/DL — HIGH (ref 70–99)
GLUCOSE BLDC GLUCOMTR-MCNC: 187 MG/DL — HIGH (ref 70–99)
GLUCOSE BLDC GLUCOMTR-MCNC: 249 MG/DL — HIGH (ref 70–99)
GLUCOSE BLDC GLUCOMTR-MCNC: 250 MG/DL — HIGH (ref 70–99)
GLUCOSE SERPL-MCNC: 287 MG/DL — HIGH (ref 70–99)
GLUCOSE SERPL-MCNC: 427 MG/DL — HIGH (ref 70–99)
HBA1C BLD-MCNC: 13.8 % — HIGH (ref 4–5.6)
HCT VFR BLD CALC: 44.4 % — SIGNIFICANT CHANGE UP (ref 39–50)
HGB BLD-MCNC: 14.5 G/DL — SIGNIFICANT CHANGE UP (ref 13–17)
IMM GRANULOCYTES # BLD AUTO: 0.02 # — SIGNIFICANT CHANGE UP
IMM GRANULOCYTES NFR BLD AUTO: 0.2 % — SIGNIFICANT CHANGE UP (ref 0–1.5)
LYMPHOCYTES # BLD AUTO: 5.2 K/UL — HIGH (ref 1–3.3)
LYMPHOCYTES # BLD AUTO: 58.8 % — HIGH (ref 13–44)
LYMPHOCYTES NFR SPEC AUTO: 49.1 % — HIGH (ref 13–44)
MAGNESIUM SERPL-MCNC: 1.7 MG/DL — SIGNIFICANT CHANGE UP (ref 1.6–2.6)
MAGNESIUM SERPL-MCNC: 1.9 MG/DL — SIGNIFICANT CHANGE UP (ref 1.6–2.6)
MANUAL SMEAR VERIFICATION: SIGNIFICANT CHANGE UP
MCHC RBC-ENTMCNC: 27.3 PG — SIGNIFICANT CHANGE UP (ref 27–34)
MCHC RBC-ENTMCNC: 32.7 % — SIGNIFICANT CHANGE UP (ref 32–36)
MCV RBC AUTO: 83.6 FL — SIGNIFICANT CHANGE UP (ref 80–100)
METAMYELOCYTES # FLD: 0 % — SIGNIFICANT CHANGE UP (ref 0–1)
MONOCYTES # BLD AUTO: 0.6 K/UL — SIGNIFICANT CHANGE UP (ref 0–0.9)
MONOCYTES NFR BLD AUTO: 6.8 % — SIGNIFICANT CHANGE UP (ref 2–14)
MONOCYTES NFR BLD: 2.8 % — SIGNIFICANT CHANGE UP (ref 2–9)
MORPHOLOGY BLD-IMP: NORMAL — SIGNIFICANT CHANGE UP
MYELOCYTES NFR BLD: 0 % — SIGNIFICANT CHANGE UP (ref 0–0)
NEUTROPHIL AB SER-ACNC: 43.5 % — SIGNIFICANT CHANGE UP (ref 43–77)
NEUTROPHILS # BLD AUTO: 2.97 K/UL — SIGNIFICANT CHANGE UP (ref 1.8–7.4)
NEUTROPHILS NFR BLD AUTO: 33.5 % — LOW (ref 43–77)
NEUTS BAND # BLD: 0 % — SIGNIFICANT CHANGE UP (ref 0–6)
NRBC # FLD: 0 — SIGNIFICANT CHANGE UP
OTHER - HEMATOLOGY %: 0 — SIGNIFICANT CHANGE UP
PHOSPHATE SERPL-MCNC: 3.1 MG/DL — SIGNIFICANT CHANGE UP (ref 2.5–4.5)
PHOSPHATE SERPL-MCNC: 3.9 MG/DL — SIGNIFICANT CHANGE UP (ref 2.5–4.5)
PLATELET # BLD AUTO: 237 K/UL — SIGNIFICANT CHANGE UP (ref 150–400)
PLATELET COUNT - ESTIMATE: NORMAL — SIGNIFICANT CHANGE UP
PMV BLD: 10.7 FL — SIGNIFICANT CHANGE UP (ref 7–13)
POTASSIUM SERPL-MCNC: 4.2 MMOL/L — SIGNIFICANT CHANGE UP (ref 3.5–5.3)
POTASSIUM SERPL-MCNC: 4.4 MMOL/L — SIGNIFICANT CHANGE UP (ref 3.5–5.3)
POTASSIUM SERPL-SCNC: 4.2 MMOL/L — SIGNIFICANT CHANGE UP (ref 3.5–5.3)
POTASSIUM SERPL-SCNC: 4.4 MMOL/L — SIGNIFICANT CHANGE UP (ref 3.5–5.3)
PROMYELOCYTES # FLD: 0 % — SIGNIFICANT CHANGE UP (ref 0–0)
PROT SERPL-MCNC: 6.8 G/DL — SIGNIFICANT CHANGE UP (ref 6–8.3)
RBC # BLD: 5.31 M/UL — SIGNIFICANT CHANGE UP (ref 4.2–5.8)
RBC # FLD: 12.8 % — SIGNIFICANT CHANGE UP (ref 10.3–14.5)
REVIEW TO FOLLOW: YES — SIGNIFICANT CHANGE UP
SMUDGE CELLS # BLD: PRESENT — SIGNIFICANT CHANGE UP
SODIUM SERPL-SCNC: 133 MMOL/L — LOW (ref 135–145)
SODIUM SERPL-SCNC: 136 MMOL/L — SIGNIFICANT CHANGE UP (ref 135–145)
SPECIMEN SOURCE: SIGNIFICANT CHANGE UP
TROPONIN T, HIGH SENSITIVITY: 546 NG/L — CRITICAL HIGH (ref ?–14)
VARIANT LYMPHS # BLD: 4.6 % — SIGNIFICANT CHANGE UP
WBC # BLD: 8.85 K/UL — SIGNIFICANT CHANGE UP (ref 3.8–10.5)
WBC # FLD AUTO: 8.85 K/UL — SIGNIFICANT CHANGE UP (ref 3.8–10.5)

## 2018-12-24 PROCEDURE — 99233 SBSQ HOSP IP/OBS HIGH 50: CPT | Mod: GC

## 2018-12-24 PROCEDURE — 99233 SBSQ HOSP IP/OBS HIGH 50: CPT

## 2018-12-24 PROCEDURE — 99232 SBSQ HOSP IP/OBS MODERATE 35: CPT

## 2018-12-24 PROCEDURE — 99222 1ST HOSP IP/OBS MODERATE 55: CPT

## 2018-12-24 RX ORDER — INSULIN GLARGINE 100 [IU]/ML
21 INJECTION, SOLUTION SUBCUTANEOUS AT BEDTIME
Qty: 0 | Refills: 0 | Status: DISCONTINUED | OUTPATIENT
Start: 2018-12-24 | End: 2018-12-25

## 2018-12-24 RX ORDER — HUMAN INSULIN 100 [IU]/ML
5 INJECTION, SUSPENSION SUBCUTANEOUS ONCE
Qty: 0 | Refills: 0 | Status: COMPLETED | OUTPATIENT
Start: 2018-12-24 | End: 2018-12-24

## 2018-12-24 RX ORDER — INSULIN LISPRO 100/ML
7 VIAL (ML) SUBCUTANEOUS
Qty: 0 | Refills: 0 | Status: DISCONTINUED | OUTPATIENT
Start: 2018-12-25 | End: 2018-12-26

## 2018-12-24 RX ORDER — INSULIN LISPRO 100/ML
2 VIAL (ML) SUBCUTANEOUS ONCE
Qty: 0 | Refills: 0 | Status: COMPLETED | OUTPATIENT
Start: 2018-12-24 | End: 2018-12-24

## 2018-12-24 RX ORDER — INSULIN LISPRO 100/ML
4 VIAL (ML) SUBCUTANEOUS
Qty: 0 | Refills: 0 | Status: COMPLETED | OUTPATIENT
Start: 2018-12-24 | End: 2018-12-24

## 2018-12-24 RX ADMIN — Medication 4: at 09:19

## 2018-12-24 RX ADMIN — Medication 2: at 18:32

## 2018-12-24 RX ADMIN — Medication 81 MILLIGRAM(S): at 14:03

## 2018-12-24 RX ADMIN — HEPARIN SODIUM 1000 UNIT(S)/HR: 5000 INJECTION INTRAVENOUS; SUBCUTANEOUS at 03:03

## 2018-12-24 RX ADMIN — HALOPERIDOL DECANOATE 20 MILLIGRAM(S): 100 INJECTION INTRAMUSCULAR at 22:11

## 2018-12-24 RX ADMIN — Medication 1 MILLIGRAM(S): at 09:20

## 2018-12-24 RX ADMIN — Medication 2 UNIT(S): at 01:49

## 2018-12-24 RX ADMIN — ATORVASTATIN CALCIUM 80 MILLIGRAM(S): 80 TABLET, FILM COATED ORAL at 22:11

## 2018-12-24 RX ADMIN — DIVALPROEX SODIUM 500 MILLIGRAM(S): 500 TABLET, DELAYED RELEASE ORAL at 09:20

## 2018-12-24 RX ADMIN — HUMAN INSULIN 5 UNIT(S): 100 INJECTION, SUSPENSION SUBCUTANEOUS at 14:01

## 2018-12-24 RX ADMIN — Medication 25 MILLIGRAM(S): at 06:24

## 2018-12-24 RX ADMIN — Medication 4: at 14:02

## 2018-12-24 RX ADMIN — Medication 4 UNIT(S): at 18:32

## 2018-12-24 RX ADMIN — TICAGRELOR 90 MILLIGRAM(S): 90 TABLET ORAL at 06:24

## 2018-12-24 NOTE — PROGRESS NOTE ADULT - ASSESSMENT
28 year old man history of uncontrolled T2DM on oral Metformin, intellectual disability, bipolar d/o, schizophrenia presents to the ED on 12/22 with complaints of chest pain with concern for NSTEMI vs myositis also considering demand ischemia. Also presented with NBNB; found to be with elevated glucose with concern for mild DKA; now transitioned off insulin drip.

## 2018-12-24 NOTE — PROGRESS NOTE ADULT - ASSESSMENT
28M with PMH of poorly controlled T2DM, intellectual disability, bipolar d/o, schizophrenia p/w severe midsternal chest pain a/w NBNB emesis. Found to have DKA.     #Chest pain  - TTE with normal LV function  - Currently chest pain free  - Cardiac enzymes down trending, can stop trending  - Heparin gtt for total of 48 hours  - Continue ASA, Brilinta   - Check lipid panel     To be discussed with attending.     JARED Weber  Cardiology Fellow  [id number]

## 2018-12-24 NOTE — PROGRESS NOTE ADULT - PROBLEM SELECTOR PLAN 1
Acute onset; Cardiac enzymes were elevated with HS trop 913, , 35.27. EKG w/ NSR and no LISETTE  As per cards; likely demand ischemia in the setting of DKA  -S/P Aspirin an Brilinta load   -c/w Asp/ Brilinta/ Lopressor  -c/w heparin drip  -trend trops  -c/w Atorvastatin  -F/U TTE  -Cardiology consulted, recs appreciated;  ----plan for cardiac cath...(non-urgent) Acute onset; Cardiac enzymes were elevated with HS trop 913, , 35.27. EKG w/ NSR and no LISETTE  As per cards; likely demand ischemia in the setting of DKA  -S/P Aspirin an Brilinta load   -c/w Asp/ Brilinta/ Lopressor  -c/w heparin drip  -trend trops  -c/w Atorvastatin  -F/U TTE  -Cardiology consulted, recs appreciated;  ----no plan for cath at this time

## 2018-12-24 NOTE — PROGRESS NOTE BEHAVIORAL HEALTH - NSBHFUPINTERVALHXFT_PSY_A_CORE
Patient is a 29 yo single never  man of [place] descent with no dependents, domiciled at home with parents who are his caregivers, unemployed, diagnosed with bipolar disorder, Intermittent Explosive Disorder, intellectual disability (IQ=40), multiple inpatient psychiatric admissions last to [facility] in Sept 29 2015 to Oct 7 2015, currently in [facility] with NP Nury Pabon; he has a long history of aggression toward family and chronic poor frustration tolerance; no prior suicide attempts, no legal problems, no substance use, no abuse history, has PMH of DM2, who has been admitted for chest pain. Psychiatry was consulted to assess his agitation.  Based on the assessment today, patient is calm and resting in the bed. Patient has poor frustration tolerance but not aggressive and agitated today. He is on 1:1 at bedside. He has significant improvement in behavior and following commands. Denies chest pain, confusion, anxiety, auditory/visual hallucinations, Si/Hi, at this moment. Patient will benefit with f/u outpatient psychiatry. Patient is a 27 yo single never  man of [place] descent with no dependents, domiciled at home with parents who are his caregivers, unemployed, diagnosed with bipolar disorder, Intermittent Explosive Disorder, intellectual disability (IQ=40), multiple inpatient psychiatric admissions last to [facility] in Sept 29 2015 to Oct 7 2015, currently in [facility] with NP Nury Pabon; he has a long history of aggression toward family and chronic poor frustration tolerance; no prior suicide attempts, no legal problems, no substance use, no abuse history, has PMH of DM2, who has been admitted for chest pain. Psychiatry was consulted to assess his agitation.  Based on the assessment today, patient is calm and resting in the bed. Sound asleep. Discussed with RN- Patient has poor frustration tolerance but not aggressive and agitated today. He is on 1:1 at bedside. He has significant improvement in behavior and following commands. Denies chest pain, confusion, anxiety, auditory/visual hallucinations, Si/Hi, at this moment. Patient will benefit with f/u outpatient psychiatry.

## 2018-12-24 NOTE — PROGRESS NOTE BEHAVIORAL HEALTH - SUMMARY
Briefly, Patient is a 27 yo single never  man of Citizen of [place] descent with no dependents, domiciled at home with parents who are his caregivers, unemployed, diagnosed with bipolar disorder, Intermittent Explosive Disorder, intellectual disability (IQ=40), multiple inpatient psychiatric admissions last to [facility] in Sept 29 2015 to Oct 7 2015, currently in [facility] with NP Nury Pabon; he has a long history of aggression toward family and chronic poor frustration tolerance; no prior suicide attempts, no legal problems, no substance use, no abuse history, has PMH of DM2, who has been admitted for chest pain. Psychiatry was consulted to assess his agitation.  Based on the assessment today, patient is calm and resting in the bed. Patient has poor frustration tolerance but not aggressive and agitated today. He is on 1:1 at bedside. He has significant improvement in behavior and following commands. Denies chest pain, confusion, anxiety, auditory/visual hallucinations, Si/Hi, at this moment. Patient will benefit with f/u outpatient psychiatry. Briefly, Patient is a 27 yo single never  man of [place] descent with no dependents, domiciled at home with parents who are his caregivers, unemployed, diagnosed with bipolar disorder, Intermittent Explosive Disorder, intellectual disability (IQ=40), multiple inpatient psychiatric admissions last to [facility] in Sept 29 2015 to Oct 7 2015, currently in [facility] with NP Nury Pabon; he has a long history of aggression toward family and chronic poor frustration tolerance; no prior suicide attempts, no legal problems, no substance use, no abuse history, has PMH of DM2, who has been admitted for chest pain. Psychiatry was consulted to assess his agitation.  Based on the assessment today, patient is calm and resting in the bed. Patient has poor frustration tolerance but not aggressive and agitated today. He is on 1:1 at bedside. He has significant improvement in behavior and following commands. Denies chest pain, confusion, anxiety, auditory/visual hallucinations, Si/Hi, at this moment. Patient will benefit with f/u outpatient psychiatry. No indication for an inpatient psychiatric admission at this point

## 2018-12-24 NOTE — PROGRESS NOTE ADULT - SUBJECTIVE AND OBJECTIVE BOX
----------------------------------------------------------------  Sonam Dubois PGY1  Internal Medicine   [phone]  ----------------------------------------------------------------    Interval Events: No acute event overnight. Patient is seen and examined at the bedside this morning.   Yesterday patient was extremely agitated and pulling out IV's; he received Ativan, Zyprexa and Haldol during the day without significant improvement in behavior. However improved after receiving his daily dose of haldol 20 at night. This AM, patient was calm and resting in bed. No CP or other complaints at this time.     OBJECTIVE:  Vital Signs Last 24 Hrs  T(C): 36.5 (24 Dec 2018 06:20), Max: 37.1 (23 Dec 2018 10:22)  T(F): 97.7 (24 Dec 2018 06:20), Max: 98.7 (23 Dec 2018 10:22)  HR: 95 (24 Dec 2018 06:20) (69 - 96)  BP: 99/66 (24 Dec 2018 06:20) (99/66 - 131/91)  BP(mean): --  RR: 17 (24 Dec 2018 06:20) (17 - 20)  SpO2: 99% (24 Dec 2018 06:20) (97% - 100%)      PHYSICAL EXAM:  General:  man in NAD.   HEENT:  PERRL, EOMI	  Cardiovascular: increased heart rate, regular rhythm, Normal S1 S2, No JVD, No murmurs appreciated  Respiratory: Lungs clear to auscultation	  Gastrointestinal:  Soft, Non-tender, + BS	  Skin: No rashes, No ecchymoses, No cyanosis	  Extremities: Normal range of motion, No clubbing, cyanosis or edema  Vascular: Peripheral pulses palpable 2+ bilaterally      HOSPITAL MEDICATIONS:  aspirin  chewable 81 milliGRAM(s) Oral daily  heparin  Infusion.  Unit(s)/Hr IV Continuous <Continuous>  ticagrelor 90 milliGRAM(s) Oral two times a day  metoprolol tartrate 25 milliGRAM(s) Oral two times a day  atorvastatin 80 milligRAM(s) Oral at bedtime  dextrose 40% Gel 15 Gram(s) Oral once PRN  dextrose 50% Injectable 12.5 Gram(s) IV Push once  dextrose 50% Injectable 25 Gram(s) IV Push once  dextrose 50% Injectable 25 Gram(s) IV Push once  glucagon  Injectable 1 milliGRAM(s) IntraMuscular once PRN  insulin glargine Injectable (LANTUS) 10 Unit(s) SubCutaneous at bedtime  insulin lispro (HumaLOG) corrective regimen sliding scale   SubCutaneous three times a day before meals  insulin lispro (HumaLOG) corrective regimen sliding scale   SubCutaneous at bedtime  insulin NPH human recombinant 5 Unit(s) SubCutaneous once  benztropine 1 milliGRAM(s) Oral two times a day  diVALproex  milliGRAM(s) Oral two times a day  haloperidol     Tablet 20 milliGRAM(s) Oral at bedtime  haloperidol    Injectable 5 milliGRAM(s) IV Push once  LORazepam   Injectable 2 milliGRAM(s) IntraMuscular every 6 hours PRN  dextrose 5%. 1000 milliLiter(s) IV Continuous <Continuous>    LABS:                        14.5   8.85  )-----------( 237      ( 24 Dec 2018 07:15 )             44.4     Hgb Trend: 14.5<--, 15.2<--, 14.3<--, 15.7<--  12-24    136  |  102  |  8   ----------------------------<  287<H>  4.2   |  21<L>  |  0.60    Ca    9.5      24 Dec 2018 07:15  Phos  3.9     12-24  Mg     1.9     12-24    TPro  6.8  /  Alb  3.8  /  TBili  0.3  /  DBili  x   /  AST  29  /  ALT  14  /  AlkPhos  69  12-24  Creatinine Trend: 0.60<--, 0.56<--, 0.49<--, 0.60<--, 0.54<--, 0.68<--  PT/INR - ( 23 Dec 2018 05:12 )   PT: 12.1 SEC;   INR: 1.06     PTT - ( 24 Dec 2018 01:35 )  PTT:70.8 SEC  Urinalysis Basic - ( 22 Dec 2018 18:36 )  Color: YELLOW / Appearance: CLEAR / S.020 / pH: 6.0  Gluc: 500 / Ketone: 160  / Bili: NEGATIVE / Urobili: 0.2   Blood: NEGATIVE / Protein: NEGATIVE / Nitrite: NEGATIVE   Leuk Esterase: NEGATIVE / RBC: x / WBC x   Sq Epi: x / Non Sq Epi: x / Bacteria: x      Venous Blood Gas:   @ 04:22  7.37/47/48/25/81.0  VBG Lactate: 1.4  Venous Blood Gas:   @ 00:31  7.28/57/< 24/21/27.4  VBG Lactate: 3.1  Venous Blood Gas:   @ 18:36  7.26/53/34/20/55.7  VBG Lactate: 2.7  Venous Blood Gas:   @ 17:18  7.35/49/31/24/50.3  VBG Lactate: 2.4

## 2018-12-24 NOTE — CONSULT NOTE ADULT - PROBLEM SELECTOR RECOMMENDATION 9
Recommend basal bolus insulin  Increase lantus to 21 units at bedtime  Start humalog 7 units before meals - (will only give 4 units before dinner today as pt received NPH at 2 pm)  Continue correctional humalog scale  Given DKA on admission and elevated Hba1c, recommend discharge on insulin, regimen to be determined  Attempted to discuss with patient, will need further discussion and education when more cooperative  Discussed with primary team.

## 2018-12-24 NOTE — PROGRESS NOTE ADULT - PROBLEM SELECTOR PLAN 2
Likely in setting of poorly uncontrolled DM and non-compliance with diet. r/o infectious source. UA negative. CXR clear.   s/p Humalog 5U, Insulin gtt, 2L NS Bolus and 2L LR  -AG closing and transitioned of insulin gtt  -BMP q4  -Maintain K >4  -F/U A1C  -SSI, FS; will  got 5 of NPH this AM. will dose premeal and bedtime insulin   -Endo consult

## 2018-12-24 NOTE — PROGRESS NOTE ADULT - SUBJECTIVE AND OBJECTIVE BOX
Patient seen and examined at bedside.    Overnight Events: Patient says he is "sick". Denies CP and says he wants to sleep.     REVIEW OF SYSTEMS:  Patient will not answer    Current Meds:  aspirin  chewable 81 milliGRAM(s) Oral daily  atorvastatin 80 milliGRAM(s) Oral at bedtime  benztropine 1 milliGRAM(s) Oral two times a day  dextrose 40% Gel 15 Gram(s) Oral once PRN  dextrose 5%. 1000 milliLiter(s) IV Continuous <Continuous>  dextrose 50% Injectable 12.5 Gram(s) IV Push once  dextrose 50% Injectable 25 Gram(s) IV Push once  dextrose 50% Injectable 25 Gram(s) IV Push once  diVALproex  milliGRAM(s) Oral two times a day  glucagon  Injectable 1 milliGRAM(s) IntraMuscular once PRN  haloperidol     Tablet 20 milliGRAM(s) Oral at bedtime  haloperidol    Injectable 5 milliGRAM(s) IV Push once  heparin  Infusion.  Unit(s)/Hr IV Continuous <Continuous>  heparin  Injectable 5300 Unit(s) IV Push every 6 hours PRN  insulin glargine Injectable (LANTUS) 10 Unit(s) SubCutaneous at bedtime  insulin lispro (HumaLOG) corrective regimen sliding scale   SubCutaneous three times a day before meals  insulin lispro (HumaLOG) corrective regimen sliding scale   SubCutaneous at bedtime  insulin NPH human recombinant 5 Unit(s) SubCutaneous once  LORazepam   Injectable 2 milliGRAM(s) IntraMuscular every 6 hours PRN  metoprolol tartrate 25 milliGRAM(s) Oral two times a day  ticagrelor 90 milliGRAM(s) Oral two times a day      PAST MEDICAL & SURGICAL HISTORY:  Schizo-affective schizophrenia  Bipolar 1 disorder  Obesity  Scoliosis  Mental retardation  No significant past surgical history      Vitals:  T(F): 97.7 (12-24), Max: 98.4 (12-23)  HR: 95 (12-24) (69 - 96)  BP: 99/66 (12-24) (99/66 - 125/99)  RR: 17 (12-24)  SpO2: 99% (12-24)  I&O's Summary      Physical Exam:  Appearance: No acute distress; well appearing  Eyes: PERRL, EOMI, pink conjunctiva  HENT: Normal oral mucosa  Cardiovascular: RRR, S1, S2, no murmurs, rubs, or gallops; no edema; no JVD  Respiratory: Clear to auscultation bilaterally  Gastrointestinal: soft, non-tender, non-distended with normal bowel sounds  Musculoskeletal: No clubbing; no joint deformity   Neurologic: Non-focal  Lymphatic: No lymphadenopathy  Psychiatry: AAOx3, mood & affect appropriate  Skin: No rashes, ecchymoses, or cyanosis                          14.5   8.85  )-----------( 237      ( 24 Dec 2018 07:15 )             44.4     12-24    136  |  102  |  8   ----------------------------<  287<H>  4.2   |  21<L>  |  0.60    Ca    9.5      24 Dec 2018 07:15  Phos  3.9     12-24  Mg     1.9     12-24    TPro  6.8  /  Alb  3.8  /  TBili  0.3  /  DBili  x   /  AST  29  /  ALT  14  /  AlkPhos  69  12-24    PT/INR - ( 23 Dec 2018 05:12 )   PT: 12.1 SEC;   INR: 1.06          PTT - ( 24 Dec 2018 01:35 )  PTT:70.8 SEC  CARDIAC MARKERS ( 23 Dec 2018 02:40 )  x     / x     / 655 u/L / 44.05 ng/mL / x      CARDIAC MARKERS ( 23 Dec 2018 00:31 )  x     / x     / 500 u/L / 35.27 ng/mL / x      CARDIAC MARKERS ( 22 Dec 2018 17:18 )  x     / x     / 82 u/L / 1.89 ng/mL / x        HS trop peaked at 1692  CK peaked at 644  CKMB peaked at 44    Hemoglobin A1C, Whole Blood: 13.8 % (12-24 @ 07:15)    < from: Transthoracic Echocardiogram (12.23.18 @ 09:47) >  DIMENSIONS:  Dimensions:     Normal Values:  LA:     2.9 cm    2.0 - 4.0 cm  Ao:     2.7 cm    2.0 - 3.8 cm  SEPTUM: 0.9 cm    0.6 - 1.2 cm  PWT: 0.9 cm    0.6 - 1.1 cm  LVIDd:  4.1 cm    3.0 - 5.6 cm  LVIDs:  3.0 cm    1.8 - 4.0 cm  Derived Variables:  LVMI: 56 g/m2  RWT: 0.43  Fractional short: 27 %  Ejection Fraction (Berman Rule): 60 %  ------------------------------------------------------------------------  OBSERVATIONS:  Mitral Valve: Normal mitral valve.  Aortic Root: Normal aortic root.  Aortic Valve: Normal trileaflet aortic valve.  Left Atrium: Normal left atrium.  Left Ventricle: Normal left ventricular systolic function.  Normal left ventricular internal dimensions and wall  thicknesses.  Right Heart: Normal right atrium. Normal right ventricular  size and function. Normal tricuspid valve. Normal pulmonic  valve.  Pericardium/PleuraNormal pericardium with no pericardial  effusion.  ------------------------------------------------------------------------  CONCLUSIONS:  1. Normal left ventricular internal dimensions and wall  thicknesses.  2. Normal left ventricular systolic function.    < end of copied text >    Interpretation of Telemetry: sinus rhythm 90s, one episode of sinus tachy to 130s

## 2018-12-24 NOTE — CONSULT NOTE ADULT - SUBJECTIVE AND OBJECTIVE BOX
HPI:  28 year old man with bipolar disorder, Type 2 DM uncontrolled, admitted wt DKA and chest pain.     Shakes head negatively to question about whether he is taking any medication for his diabetes, other than that he is not answering any other questions.  History is from chart.    Treated with insulin drip, then transitioned to subcutaneous insulin      PAST MEDICAL & SURGICAL HISTORY:  Schizo-affective schizophrenia  Bipolar 1 disorder  Obesity  Scoliosis  Mental retardation  No significant past surgical history      FAMILY HISTORY:  unable to obtain      Social History:  unable to obtain    Outpatient Medications:  atorvastatin 40 mg oral tablet: 1 tab(s) orally once a day (at bedtime)  · 	divalproex sodium 500 mg oral delayed release tablet: 1 tab(s) orally 2 times a day  · 	benztropine 1 mg oral tablet: 1 tab(s) orally 2 times a day  · 	propranolol 40 mg oral tablet: 1 tab(s) orally 2 times a day  · 	haloperidol 20 mg oral tablet: 1 tab(s) orally once a day (at bedtime)        MEDICATIONS  (STANDING):  aspirin  chewable 81 milliGRAM(s) Oral daily  atorvastatin 80 milliGRAM(s) Oral at bedtime  benztropine 1 milliGRAM(s) Oral two times a day  dextrose 5%. 1000 milliLiter(s) (50 mL/Hr) IV Continuous <Continuous>  dextrose 50% Injectable 12.5 Gram(s) IV Push once  dextrose 50% Injectable 25 Gram(s) IV Push once  dextrose 50% Injectable 25 Gram(s) IV Push once  diVALproex  milliGRAM(s) Oral two times a day  haloperidol     Tablet 20 milliGRAM(s) Oral at bedtime  haloperidol    Injectable 5 milliGRAM(s) IV Push once  heparin  Infusion.  Unit(s)/Hr (10 mL/Hr) IV Continuous <Continuous>  insulin glargine Injectable (LANTUS) 10 Unit(s) SubCutaneous at bedtime  (also recieved glargine 10 units at 5 am om 12/23)  insulin lispro (HumaLOG) corrective regimen sliding scale   SubCutaneous three times a day before meals  insulin lispro (HumaLOG) corrective regimen sliding scale   SubCutaneous at bedtime  metoprolol tartrate 25 milliGRAM(s) Oral two times a day  ticagrelor 90 milliGRAM(s) Oral two times a day  NPH insulin 5 units subcut administered at 2 pm 12/24    MEDICATIONS  (PRN):  dextrose 40% Gel 15 Gram(s) Oral once PRN Blood Glucose LESS THAN 70 milliGRAM(s)/deciliter  glucagon  Injectable 1 milliGRAM(s) IntraMuscular once PRN Glucose LESS THAN 70 milligrams/deciliter  heparin  Injectable 5300 Unit(s) IV Push every 6 hours PRN For aPTT less than 40  LORazepam   Injectable 2 milliGRAM(s) IntraMuscular every 6 hours PRN Agitation      Allergies    No Known Allergies    Intolerances      Review of Systems:  Pt states he feels "sick" not answering further questions  Also states he "wants to die"      PHYSICAL EXAM:  VITALS: T(C): 36.6 (12-24-18 @ 14:04)  T(F): 97.8 (12-24-18 @ 14:04), Max: 98.4 (12-23-18 @ 21:10)  HR: 82 (12-24-18 @ 14:04) (69 - 96)  BP: 115/75 (12-24-18 @ 14:04) (99/66 - 125/99)  RR:  (16 - 20)  SpO2:  (97% - 100%)  Wt(kg): 90 kg  GENERAL: NAD,   EYES: closed  HEENT:  Atraumatic, Normocephalic,   THYROID: Normal size, no palpable nodules  RESPIRATORY: Clear to auscultation bilaterally  CARDIOVASCULAR: Regular rhythm; No murmurs; no peripheral edema  GI: Soft, nontender, non distended, normal bowel sounds  SKIN: Dry, intact, No rashes or lesions  NEURO: pt declines exam at this time  PSYCH: calm, not cooperative, closed his eyes and turned his face towards his pillow      POCT Blood Glucose.: 250 mg/dL (12-24-18 @ 13:59)  POCT Blood Glucose.: 249 mg/dL (12-24-18 @ 13:03)  POCT Blood Glucose.: 242 mg/dL (12-24-18 @ 09:02)  POCT Blood Glucose.: 298 mg/dL (12-24-18 @ 06:28)  POCT Blood Glucose.: 343 mg/dL (12-24-18 @ 01:25)  POCT Blood Glucose.: 354 mg/dL (12-23-18 @ 22:48)  POCT Blood Glucose.: 448 mg/dL (12-23-18 @ 21:35)  POCT Blood Glucose.: 304 mg/dL (12-23-18 @ 16:03)  POCT Blood Glucose.: 301 mg/dL (12-23-18 @ 12:02)  POCT Blood Glucose.: 183 mg/dL (12-23-18 @ 08:51)  POCT Blood Glucose.: 209 mg/dL (12-23-18 @ 08:13)  POCT Blood Glucose.: 160 mg/dL (12-23-18 @ 06:49)  POCT Blood Glucose.: 95 mg/dL (12-23-18 @ 05:48)  POCT Blood Glucose.: 95 mg/dL (12-23-18 @ 04:59)  POCT Blood Glucose.: 128 mg/dL (12-23-18 @ 03:57)  POCT Blood Glucose.: 167 mg/dL (12-23-18 @ 02:57)  POCT Blood Glucose.: 214 mg/dL (12-23-18 @ 01:56)  POCT Blood Glucose.: 269 mg/dL (12-23-18 @ 01:02)  POCT Blood Glucose.: 326 mg/dL (12-23-18 @ 00:50)  POCT Blood Glucose.: 359 mg/dL (12-22-18 @ 23:27)  POCT Blood Glucose.: 429 mg/dL (12-22-18 @ 22:21)  POCT Blood Glucose.: 449 mg/dL (12-22-18 @ 21:22)  POCT Blood Glucose.: 437 mg/dL (12-22-18 @ 20:31)  POCT Blood Glucose.: 323 mg/dL (12-22-18 @ 18:32)  POCT Blood Glucose.: 431 mg/dL (12-22-18 @ 16:27)                            14.5   8.85  )-----------( 237      ( 24 Dec 2018 07:15 )             44.4       12-24    136  |  102  |  8   ----------------------------<  287<H>  4.2   |  21<L>  |  0.60    EGFR if : 160  EGFR if non : 138    Ca    9.5      12-24  Mg     1.9     12-24  Phos  3.9     12-24    TPro  6.8  /  Alb  3.8  /  TBili  0.3  /  DBili  x   /  AST  29  /  ALT  14  /  AlkPhos  69  12-24    Thyroid Function Tests:      Hemoglobin A1C, Whole Blood: 13.8 % <H> [4.0 - 5.6] (12-24-18 @ 07:15)        Radiology:

## 2018-12-25 LAB
APTT BLD: 76.2 SEC — HIGH (ref 27.5–36.3)
B-OH-BUTYR SERPL-SCNC: 0.2 MMOL/L — SIGNIFICANT CHANGE UP (ref 0–0.4)
BUN SERPL-MCNC: 10 MG/DL — SIGNIFICANT CHANGE UP (ref 7–23)
BUN SERPL-MCNC: 12 MG/DL — SIGNIFICANT CHANGE UP (ref 7–23)
BUN SERPL-MCNC: 12 MG/DL — SIGNIFICANT CHANGE UP (ref 7–23)
CALCIUM SERPL-MCNC: 10.2 MG/DL — SIGNIFICANT CHANGE UP (ref 8.4–10.5)
CALCIUM SERPL-MCNC: 9.5 MG/DL — SIGNIFICANT CHANGE UP (ref 8.4–10.5)
CALCIUM SERPL-MCNC: 9.8 MG/DL — SIGNIFICANT CHANGE UP (ref 8.4–10.5)
CHLORIDE SERPL-SCNC: 100 MMOL/L — SIGNIFICANT CHANGE UP (ref 98–107)
CHLORIDE SERPL-SCNC: 97 MMOL/L — LOW (ref 98–107)
CHLORIDE SERPL-SCNC: 99 MMOL/L — SIGNIFICANT CHANGE UP (ref 98–107)
CO2 SERPL-SCNC: 21 MMOL/L — LOW (ref 22–31)
CO2 SERPL-SCNC: 21 MMOL/L — LOW (ref 22–31)
CO2 SERPL-SCNC: 22 MMOL/L — SIGNIFICANT CHANGE UP (ref 22–31)
CREAT SERPL-MCNC: 0.61 MG/DL — SIGNIFICANT CHANGE UP (ref 0.5–1.3)
CREAT SERPL-MCNC: 0.74 MG/DL — SIGNIFICANT CHANGE UP (ref 0.5–1.3)
CREAT SERPL-MCNC: 0.8 MG/DL — SIGNIFICANT CHANGE UP (ref 0.5–1.3)
GLUCOSE BLDC GLUCOMTR-MCNC: 148 MG/DL — HIGH (ref 70–99)
GLUCOSE BLDC GLUCOMTR-MCNC: 203 MG/DL — HIGH (ref 70–99)
GLUCOSE BLDC GLUCOMTR-MCNC: 274 MG/DL — HIGH (ref 70–99)
GLUCOSE BLDC GLUCOMTR-MCNC: 321 MG/DL — HIGH (ref 70–99)
GLUCOSE BLDC GLUCOMTR-MCNC: 369 MG/DL — HIGH (ref 70–99)
GLUCOSE BLDC GLUCOMTR-MCNC: 421 MG/DL — HIGH (ref 70–99)
GLUCOSE SERPL-MCNC: 236 MG/DL — HIGH (ref 70–99)
GLUCOSE SERPL-MCNC: 343 MG/DL — HIGH (ref 70–99)
GLUCOSE SERPL-MCNC: 486 MG/DL — CRITICAL HIGH (ref 70–99)
HCT VFR BLD CALC: 42.7 % — SIGNIFICANT CHANGE UP (ref 39–50)
HGB BLD-MCNC: 14.4 G/DL — SIGNIFICANT CHANGE UP (ref 13–17)
MCHC RBC-ENTMCNC: 27.2 PG — SIGNIFICANT CHANGE UP (ref 27–34)
MCHC RBC-ENTMCNC: 33.7 % — SIGNIFICANT CHANGE UP (ref 32–36)
MCV RBC AUTO: 80.7 FL — SIGNIFICANT CHANGE UP (ref 80–100)
NRBC # FLD: 0 — SIGNIFICANT CHANGE UP
PLATELET # BLD AUTO: 256 K/UL — SIGNIFICANT CHANGE UP (ref 150–400)
PMV BLD: 10.7 FL — SIGNIFICANT CHANGE UP (ref 7–13)
POTASSIUM SERPL-MCNC: 3.9 MMOL/L — SIGNIFICANT CHANGE UP (ref 3.5–5.3)
POTASSIUM SERPL-MCNC: 4 MMOL/L — SIGNIFICANT CHANGE UP (ref 3.5–5.3)
POTASSIUM SERPL-MCNC: 4.3 MMOL/L — SIGNIFICANT CHANGE UP (ref 3.5–5.3)
POTASSIUM SERPL-SCNC: 3.9 MMOL/L — SIGNIFICANT CHANGE UP (ref 3.5–5.3)
POTASSIUM SERPL-SCNC: 4 MMOL/L — SIGNIFICANT CHANGE UP (ref 3.5–5.3)
POTASSIUM SERPL-SCNC: 4.3 MMOL/L — SIGNIFICANT CHANGE UP (ref 3.5–5.3)
RBC # BLD: 5.29 M/UL — SIGNIFICANT CHANGE UP (ref 4.2–5.8)
RBC # FLD: 12.9 % — SIGNIFICANT CHANGE UP (ref 10.3–14.5)
SODIUM SERPL-SCNC: 135 MMOL/L — SIGNIFICANT CHANGE UP (ref 135–145)
SODIUM SERPL-SCNC: 136 MMOL/L — SIGNIFICANT CHANGE UP (ref 135–145)
SODIUM SERPL-SCNC: 136 MMOL/L — SIGNIFICANT CHANGE UP (ref 135–145)
WBC # BLD: 9.92 K/UL — SIGNIFICANT CHANGE UP (ref 3.8–10.5)
WBC # FLD AUTO: 9.92 K/UL — SIGNIFICANT CHANGE UP (ref 3.8–10.5)

## 2018-12-25 PROCEDURE — 99232 SBSQ HOSP IP/OBS MODERATE 35: CPT

## 2018-12-25 PROCEDURE — 99233 SBSQ HOSP IP/OBS HIGH 50: CPT | Mod: GC

## 2018-12-25 RX ORDER — INSULIN GLARGINE 100 [IU]/ML
21 INJECTION, SOLUTION SUBCUTANEOUS ONCE
Qty: 0 | Refills: 0 | Status: COMPLETED | OUTPATIENT
Start: 2018-12-25 | End: 2018-12-25

## 2018-12-25 RX ORDER — ENOXAPARIN SODIUM 100 MG/ML
40 INJECTION SUBCUTANEOUS EVERY 24 HOURS
Qty: 0 | Refills: 0 | Status: DISCONTINUED | OUTPATIENT
Start: 2018-12-25 | End: 2019-01-08

## 2018-12-25 RX ORDER — INSULIN GLARGINE 100 [IU]/ML
21 INJECTION, SOLUTION SUBCUTANEOUS EVERY MORNING
Qty: 0 | Refills: 0 | Status: DISCONTINUED | OUTPATIENT
Start: 2018-12-26 | End: 2018-12-26

## 2018-12-25 RX ORDER — INSULIN GLARGINE 100 [IU]/ML
21 INJECTION, SOLUTION SUBCUTANEOUS EVERY MORNING
Qty: 0 | Refills: 0 | Status: DISCONTINUED | OUTPATIENT
Start: 2018-12-25 | End: 2018-12-25

## 2018-12-25 RX ADMIN — Medication 7 UNIT(S): at 13:19

## 2018-12-25 RX ADMIN — Medication 7 UNIT(S): at 18:11

## 2018-12-25 RX ADMIN — Medication 1 MILLIGRAM(S): at 18:12

## 2018-12-25 RX ADMIN — DIVALPROEX SODIUM 500 MILLIGRAM(S): 500 TABLET, DELAYED RELEASE ORAL at 05:14

## 2018-12-25 RX ADMIN — DIVALPROEX SODIUM 500 MILLIGRAM(S): 500 TABLET, DELAYED RELEASE ORAL at 18:12

## 2018-12-25 RX ADMIN — HALOPERIDOL DECANOATE 20 MILLIGRAM(S): 100 INJECTION INTRAMUSCULAR at 21:32

## 2018-12-25 RX ADMIN — ATORVASTATIN CALCIUM 80 MILLIGRAM(S): 80 TABLET, FILM COATED ORAL at 21:32

## 2018-12-25 RX ADMIN — Medication 25 MILLIGRAM(S): at 18:12

## 2018-12-25 RX ADMIN — Medication 7 UNIT(S): at 09:33

## 2018-12-25 RX ADMIN — Medication 1 MILLIGRAM(S): at 05:14

## 2018-12-25 RX ADMIN — Medication 12: at 18:10

## 2018-12-25 RX ADMIN — Medication 10: at 09:34

## 2018-12-25 RX ADMIN — TICAGRELOR 90 MILLIGRAM(S): 90 TABLET ORAL at 18:12

## 2018-12-25 RX ADMIN — Medication 81 MILLIGRAM(S): at 11:02

## 2018-12-25 RX ADMIN — ENOXAPARIN SODIUM 40 MILLIGRAM(S): 100 INJECTION SUBCUTANEOUS at 11:02

## 2018-12-25 RX ADMIN — INSULIN GLARGINE 21 UNIT(S): 100 INJECTION, SOLUTION SUBCUTANEOUS at 11:22

## 2018-12-25 RX ADMIN — TICAGRELOR 90 MILLIGRAM(S): 90 TABLET ORAL at 05:14

## 2018-12-25 RX ADMIN — Medication 25 MILLIGRAM(S): at 05:14

## 2018-12-25 NOTE — PROGRESS NOTE ADULT - SUBJECTIVE AND OBJECTIVE BOX
----------------------------------------------------------------  Sonam Dubois PGY1  Internal Medicine   [phone]  ----------------------------------------------------------------    Interval Events: No acute event overnight. Patient is seen and examined at the bedside this morning.   Patient's agitation was controlled overnight. No CP, or SOB. Nods in response to questions    OBJECTIVE:  Vital Signs Last 24 Hrs  T(C): 36.4 (25 Dec 2018 04:44), Max: 36.6 (24 Dec 2018 14:04)  T(F): 97.6 (25 Dec 2018 04:44), Max: 97.8 (24 Dec 2018 14:04)  HR: 95 (25 Dec 2018 04:44) (78 - 96)  BP: 101/62 (25 Dec 2018 04:44) (101/62 - 115/75)  BP(mean): --  RR: 18 (25 Dec 2018 04:44) (16 - 18)  SpO2: 100% (25 Dec 2018 04:44) (95% - 100%)      PHYSICAL EXAM:  General:  man in NAD.   HEENT:  PERRL, EOMI	  Cardiovascular: increased heart rate, regular rhythm, Normal S1 S2, No JVD, No murmurs appreciated  Respiratory: Lungs clear to auscultation	  Gastrointestinal:  Soft, Non-tender, + BS	  Skin: No rashes, No ecchymoses, No cyanosis	  Extremities: Normal range of motion, No clubbing, cyanosis or edema  Vascular: Peripheral pulses palpable 2+ bilaterally      HOSPITAL MEDICATIONS:  aspirin  chewable 81 milliGRAM(s) Oral daily  heparin  Infusion.  Unit(s)/Hr IV Continuous <Continuous>  ticagrelor 90 milliGRAM(s) Oral two times a day  metoprolol tartrate 25 milliGRAM(s) Oral two times a day  atorvastatin 80 milliGRAM(s) Oral at bedtime  dextrose 40% Gel 15 Gram(s) Oral once PRN  dextrose 50% Injectable 12.5 Gram(s) IV Push once  dextrose 50% Injectable 25 Gram(s) IV Push once  dextrose 50% Injectable 25 Gram(s) IV Push once  glucagon  Injectable 1 milliGRAM(s) IntraMuscular once PRN  insulin glargine Injectable (LANTUS) 21 Unit(s) SubCutaneous at bedtime  insulin lispro (HumaLOG) corrective regimen sliding scale   SubCutaneous three times a day before meals  insulin lispro (HumaLOG) corrective regimen sliding scale   SubCutaneous at bedtime  insulin lispro Injectable (HumaLOG) 7 Unit(s) SubCutaneous three times a day before meals  benztropine 1 milliGRAM(s) Oral two times a day  diVALproex  milliGRAM(s) Oral two times a day  haloperidol     Tablet 20 milliGRAM(s) Oral at bedtime  haloperidol    Injectable 5 milliGRAM(s) IV Push once  LORazepam   Injectable 2 milliGRAM(s) IntraMuscular every 6 hours PRN  dextrose 5%. 1000 milliLiter(s) IV Continuous <Continuous>            LABS:                        14.4   9.92  )-----------( 256      ( 25 Dec 2018 05:10 )             42.7     Hgb Trend: 14.4<--, 14.5<--, 15.2<--, 14.3<--, 15.7<--  12-25    135  |  100  |  10  ----------------------------<  343<H>  3.9   |  21<L>  |  0.61    Ca    9.5      25 Dec 2018 05:10  Phos  3.9     12-24  Mg     1.9     12-24    TPro  6.8  /  Alb  3.8  /  TBili  0.3  /  DBili  x   /  AST  29  /  ALT  14  /  AlkPhos  69  12-24    Creatinine Trend: 0.61<--, 0.60<--, 0.56<--, 0.49<--, 0.60<--, 0.54<--  PTT - ( 25 Dec 2018 05:10 )  PTT:76.2 SEC

## 2018-12-25 NOTE — PROGRESS NOTE ADULT - PROBLEM SELECTOR PLAN 2
Likely in setting of poorly uncontrolled DM and non-compliance with diet. r/o infectious source. UA negative. CXR clear.   -AG closed  -BMP q12  -Maintain K >4  -F/U A1C  -Endo consult  --Lantus 21 at bedtime; Humalog 7 premeal

## 2018-12-25 NOTE — PROGRESS NOTE ADULT - SUBJECTIVE AND OBJECTIVE BOX
Chief Complaint: Uncontrolled diabetes, DKA    History: Per staff patient refused fingerstick and Lantus last night.  Patient is on 1:1.  Asked more information about his diabetes history.  He is able to state he takes pills, not insulin and that he lives with his parents.  He is unable to provide further details.    MEDICATIONS  (STANDING):  aspirin  chewable 81 milliGRAM(s) Oral daily  atorvastatin 80 milliGRAM(s) Oral at bedtime  benztropine 1 milliGRAM(s) Oral two times a day  dextrose 5%. 1000 milliLiter(s) (50 mL/Hr) IV Continuous <Continuous>  dextrose 50% Injectable 12.5 Gram(s) IV Push once  dextrose 50% Injectable 25 Gram(s) IV Push once  dextrose 50% Injectable 25 Gram(s) IV Push once  diVALproex  milliGRAM(s) Oral two times a day  enoxaparin Injectable 40 milliGRAM(s) SubCutaneous every 24 hours  haloperidol     Tablet 20 milliGRAM(s) Oral at bedtime  haloperidol    Injectable 5 milliGRAM(s) IV Push once  insulin lispro (HumaLOG) corrective regimen sliding scale   SubCutaneous three times a day before meals  insulin lispro (HumaLOG) corrective regimen sliding scale   SubCutaneous at bedtime  insulin lispro Injectable (HumaLOG) 7 Unit(s) SubCutaneous three times a day before meals  metoprolol tartrate 25 milliGRAM(s) Oral two times a day  ticagrelor 90 milliGRAM(s) Oral two times a day    MEDICATIONS  (PRN):  dextrose 40% Gel 15 Gram(s) Oral once PRN Blood Glucose LESS THAN 70 milliGRAM(s)/deciliter  glucagon  Injectable 1 milliGRAM(s) IntraMuscular once PRN Glucose LESS THAN 70 milligrams/deciliter  LORazepam   Injectable 2 milliGRAM(s) IntraMuscular every 6 hours PRN Agitation      Allergies    No Known Allergies    Intolerances      Review of Systems:    UNABLE TO OBTAIN    PHYSICAL EXAM:  VITALS: T(C): 36.4 (12-25-18 @ 04:44)  T(F): 97.6 (12-25-18 @ 04:44), Max: 97.8 (12-24-18 @ 14:04)  HR: 95 (12-25-18 @ 04:44) (78 - 96)  BP: 101/62 (12-25-18 @ 04:44) (101/62 - 115/75)  RR:  (16 - 18)  SpO2:  (95% - 100%)  Wt(kg): --  GENERAL: NAD, well-groomed, well-developed  EYES: No proptosis, no lid lag, anicteric  HEENT:  Atraumatic, Normocephalic, moist mucous membranes  NEURO: extraocular movements intact, no tremor  PSYCH: Alert but minimally answers questions      CAPILLARY BLOOD GLUCOSE      POCT Blood Glucose.: 321 mg/dL (25 Dec 2018 11:30)  POCT Blood Glucose.: 369 mg/dL (25 Dec 2018 09:18)  POCT Blood Glucose.: 274 mg/dL (25 Dec 2018 05:12)  POCT Blood Glucose.: 120 mg/dL (24 Dec 2018 23:25)  POCT Blood Glucose.: 187 mg/dL (24 Dec 2018 18:12)  POCT Blood Glucose.: 250 mg/dL (24 Dec 2018 13:59)  POCT Blood Glucose.: 249 mg/dL (24 Dec 2018 13:03)      12-25    135  |  100  |  10  ----------------------------<  343<H>  3.9   |  21<L>  |  0.61    EGFR if : 159  EGFR if non : 137    Ca    9.5      12-25  Mg     1.9     12-24  Phos  3.9     12-24    TPro  6.8  /  Alb  3.8  /  TBili  0.3  /  DBili  x   /  AST  29  /  ALT  14  /  AlkPhos  69  12-24          Thyroid Function Tests:      Hemoglobin A1C, Whole Blood: 13.8 % <H> [4.0 - 5.6] (12-24-18 @ 07:15)

## 2018-12-25 NOTE — PROGRESS NOTE ADULT - PROBLEM SELECTOR PLAN 1
Type II NSTEMI in the setting of DKA; Cardiac enzymes were elevated with HS trop 913, , 35.27. EKG w/ NSR and no LISETTE  -S/P Aspirin an Brilinta load   -c/w Asp/ Brilinta/ Lopressor  -c/w heparin drip  -trend trops  -c/w Atorvastatin  -F/U TTE  -Cardiology consulted, recs appreciated;  ----no plan for cath at this time

## 2018-12-25 NOTE — PROGRESS NOTE ADULT - PROBLEM SELECTOR PLAN 1
Patient admitted for DKA.  Lantus was not given last night, due to patient refusal.  Discussed with primary team that patient must receive basal insulin to prevent return of DKA. Glucose is higher this AM.  Advised STAT Lantus 21 units this morning then daily in the morning. Discussed with primary team urgency for basal insulin.  Check STAT labs including BMP, BHB to rule out return of DKA.   Consider IV fluids.  If no DKA then proceed with Lantus 21u qAM and Humalog 7/7/7 and current correction scales.  If DKA is seen on labs then make NPO and call MICU.    Would also clarify type 1 vs. 2 DM. Check LIANNA and islet cell antibodies.  He will need outpatient endocrine follow up [phone].

## 2018-12-25 NOTE — PROGRESS NOTE ADULT - ASSESSMENT
28 year old man with T2DM, uncontrolled, admitted with DKA. 28 year old man with schizophrenia, DM unsure of type uncontrolled, admitted with DKA. HbA1c 13.8%.

## 2018-12-25 NOTE — PROGRESS NOTE ADULT - PROBLEM SELECTOR PLAN 4
DVT ppx: d/c Heparin drip--->Heparin Subq  Diet: Regular/Consistent Carb/DASH/TLC DVT ppx: d/c'd Heparin drip; on Aspirin and Plavix, OOB  Diet: Regular/Consistent Carb/DASH/TLC DVT ppx: d/c'd Heparin drip; now on Lovenox  Diet: Regular/Consistent Carb/DASH/TLC

## 2018-12-26 DIAGNOSIS — F63.81 INTERMITTENT EXPLOSIVE DISORDER: ICD-10-CM

## 2018-12-26 DIAGNOSIS — F79 UNSPECIFIED INTELLECTUAL DISABILITIES: ICD-10-CM

## 2018-12-26 DIAGNOSIS — F25.0 SCHIZOAFFECTIVE DISORDER, BIPOLAR TYPE: ICD-10-CM

## 2018-12-26 LAB
ALBUMIN SERPL ELPH-MCNC: 3.6 G/DL — SIGNIFICANT CHANGE UP (ref 3.3–5)
ALP SERPL-CCNC: 59 U/L — SIGNIFICANT CHANGE UP (ref 40–120)
ALT FLD-CCNC: 10 U/L — SIGNIFICANT CHANGE UP (ref 4–41)
AST SERPL-CCNC: 12 U/L — SIGNIFICANT CHANGE UP (ref 4–40)
BASOPHILS # BLD AUTO: 0.02 K/UL — SIGNIFICANT CHANGE UP (ref 0–0.2)
BASOPHILS NFR BLD AUTO: 0.2 % — SIGNIFICANT CHANGE UP (ref 0–2)
BILIRUB SERPL-MCNC: 0.5 MG/DL — SIGNIFICANT CHANGE UP (ref 0.2–1.2)
BUN SERPL-MCNC: 11 MG/DL — SIGNIFICANT CHANGE UP (ref 7–23)
CALCIUM SERPL-MCNC: 9.2 MG/DL — SIGNIFICANT CHANGE UP (ref 8.4–10.5)
CHLORIDE SERPL-SCNC: 102 MMOL/L — SIGNIFICANT CHANGE UP (ref 98–107)
CHOLEST SERPL-MCNC: 185 MG/DL — SIGNIFICANT CHANGE UP (ref 120–199)
CO2 SERPL-SCNC: 24 MMOL/L — SIGNIFICANT CHANGE UP (ref 22–31)
CREAT SERPL-MCNC: 0.69 MG/DL — SIGNIFICANT CHANGE UP (ref 0.5–1.3)
EOSINOPHIL # BLD AUTO: 0.03 K/UL — SIGNIFICANT CHANGE UP (ref 0–0.5)
EOSINOPHIL NFR BLD AUTO: 0.3 % — SIGNIFICANT CHANGE UP (ref 0–6)
GLUCOSE BLDC GLUCOMTR-MCNC: 192 MG/DL — HIGH (ref 70–99)
GLUCOSE BLDC GLUCOMTR-MCNC: 226 MG/DL — HIGH (ref 70–99)
GLUCOSE BLDC GLUCOMTR-MCNC: 237 MG/DL — HIGH (ref 70–99)
GLUCOSE BLDC GLUCOMTR-MCNC: 91 MG/DL — SIGNIFICANT CHANGE UP (ref 70–99)
GLUCOSE SERPL-MCNC: 191 MG/DL — HIGH (ref 70–99)
HCT VFR BLD CALC: 42.8 % — SIGNIFICANT CHANGE UP (ref 39–50)
HDLC SERPL-MCNC: 36 MG/DL — SIGNIFICANT CHANGE UP (ref 35–55)
HGB BLD-MCNC: 13.8 G/DL — SIGNIFICANT CHANGE UP (ref 13–17)
IMM GRANULOCYTES # BLD AUTO: 0.02 # — SIGNIFICANT CHANGE UP
IMM GRANULOCYTES NFR BLD AUTO: 0.2 % — SIGNIFICANT CHANGE UP (ref 0–1.5)
LIPID PNL WITH DIRECT LDL SERPL: 150 MG/DL — SIGNIFICANT CHANGE UP
LYMPHOCYTES # BLD AUTO: 4.7 K/UL — HIGH (ref 1–3.3)
LYMPHOCYTES # BLD AUTO: 49.4 % — HIGH (ref 13–44)
MAGNESIUM SERPL-MCNC: 1.5 MG/DL — LOW (ref 1.6–2.6)
MCHC RBC-ENTMCNC: 27 PG — SIGNIFICANT CHANGE UP (ref 27–34)
MCHC RBC-ENTMCNC: 32.2 % — SIGNIFICANT CHANGE UP (ref 32–36)
MCV RBC AUTO: 83.6 FL — SIGNIFICANT CHANGE UP (ref 80–100)
MONOCYTES # BLD AUTO: 0.71 K/UL — SIGNIFICANT CHANGE UP (ref 0–0.9)
MONOCYTES NFR BLD AUTO: 7.5 % — SIGNIFICANT CHANGE UP (ref 2–14)
NEUTROPHILS # BLD AUTO: 4.03 K/UL — SIGNIFICANT CHANGE UP (ref 1.8–7.4)
NEUTROPHILS NFR BLD AUTO: 42.4 % — LOW (ref 43–77)
NRBC # FLD: 0 — SIGNIFICANT CHANGE UP
PHOSPHATE SERPL-MCNC: 4.7 MG/DL — HIGH (ref 2.5–4.5)
PLATELET # BLD AUTO: 245 K/UL — SIGNIFICANT CHANGE UP (ref 150–400)
PMV BLD: 11.3 FL — SIGNIFICANT CHANGE UP (ref 7–13)
POTASSIUM SERPL-MCNC: 3.5 MMOL/L — SIGNIFICANT CHANGE UP (ref 3.5–5.3)
POTASSIUM SERPL-SCNC: 3.5 MMOL/L — SIGNIFICANT CHANGE UP (ref 3.5–5.3)
PROT SERPL-MCNC: 6.4 G/DL — SIGNIFICANT CHANGE UP (ref 6–8.3)
RBC # BLD: 5.12 M/UL — SIGNIFICANT CHANGE UP (ref 4.2–5.8)
RBC # FLD: 12.7 % — SIGNIFICANT CHANGE UP (ref 10.3–14.5)
SODIUM SERPL-SCNC: 138 MMOL/L — SIGNIFICANT CHANGE UP (ref 135–145)
TRIGL SERPL-MCNC: 71 MG/DL — SIGNIFICANT CHANGE UP (ref 10–149)
WBC # BLD: 9.51 K/UL — SIGNIFICANT CHANGE UP (ref 3.8–10.5)
WBC # FLD AUTO: 9.51 K/UL — SIGNIFICANT CHANGE UP (ref 3.8–10.5)

## 2018-12-26 PROCEDURE — 99232 SBSQ HOSP IP/OBS MODERATE 35: CPT

## 2018-12-26 PROCEDURE — 99233 SBSQ HOSP IP/OBS HIGH 50: CPT | Mod: GC

## 2018-12-26 RX ORDER — CLOPIDOGREL BISULFATE 75 MG/1
75 TABLET, FILM COATED ORAL DAILY
Qty: 0 | Refills: 0 | Status: DISCONTINUED | OUTPATIENT
Start: 2018-12-27 | End: 2019-01-08

## 2018-12-26 RX ORDER — LISINOPRIL 2.5 MG/1
5 TABLET ORAL DAILY
Qty: 0 | Refills: 0 | Status: DISCONTINUED | OUTPATIENT
Start: 2018-12-26 | End: 2019-01-08

## 2018-12-26 RX ORDER — INSULIN GLARGINE 100 [IU]/ML
24 INJECTION, SOLUTION SUBCUTANEOUS EVERY MORNING
Qty: 0 | Refills: 0 | Status: DISCONTINUED | OUTPATIENT
Start: 2018-12-27 | End: 2019-01-03

## 2018-12-26 RX ORDER — INSULIN GLARGINE 100 [IU]/ML
3 INJECTION, SOLUTION SUBCUTANEOUS ONCE
Qty: 0 | Refills: 0 | Status: COMPLETED | OUTPATIENT
Start: 2018-12-26 | End: 2018-12-26

## 2018-12-26 RX ORDER — INSULIN LISPRO 100/ML
10 VIAL (ML) SUBCUTANEOUS
Qty: 0 | Refills: 0 | Status: DISCONTINUED | OUTPATIENT
Start: 2018-12-26 | End: 2018-12-31

## 2018-12-26 RX ORDER — POTASSIUM CHLORIDE 20 MEQ
40 PACKET (EA) ORAL ONCE
Qty: 0 | Refills: 0 | Status: COMPLETED | OUTPATIENT
Start: 2018-12-26 | End: 2018-12-26

## 2018-12-26 RX ORDER — TICAGRELOR 90 MG/1
90 TABLET ORAL ONCE
Qty: 0 | Refills: 0 | Status: COMPLETED | OUTPATIENT
Start: 2018-12-26 | End: 2018-12-26

## 2018-12-26 RX ADMIN — Medication 2 MILLIGRAM(S): at 14:50

## 2018-12-26 RX ADMIN — Medication 2: at 09:47

## 2018-12-26 RX ADMIN — TICAGRELOR 90 MILLIGRAM(S): 90 TABLET ORAL at 18:46

## 2018-12-26 RX ADMIN — Medication 1 MILLIGRAM(S): at 05:43

## 2018-12-26 RX ADMIN — HALOPERIDOL DECANOATE 20 MILLIGRAM(S): 100 INJECTION INTRAMUSCULAR at 22:36

## 2018-12-26 RX ADMIN — Medication 25 MILLIGRAM(S): at 05:43

## 2018-12-26 RX ADMIN — DIVALPROEX SODIUM 500 MILLIGRAM(S): 500 TABLET, DELAYED RELEASE ORAL at 18:46

## 2018-12-26 RX ADMIN — Medication 4: at 13:43

## 2018-12-26 RX ADMIN — DIVALPROEX SODIUM 500 MILLIGRAM(S): 500 TABLET, DELAYED RELEASE ORAL at 05:43

## 2018-12-26 RX ADMIN — Medication 4: at 18:24

## 2018-12-26 RX ADMIN — Medication 40 MILLIEQUIVALENT(S): at 09:47

## 2018-12-26 RX ADMIN — TICAGRELOR 90 MILLIGRAM(S): 90 TABLET ORAL at 05:43

## 2018-12-26 RX ADMIN — ATORVASTATIN CALCIUM 80 MILLIGRAM(S): 80 TABLET, FILM COATED ORAL at 22:36

## 2018-12-26 RX ADMIN — INSULIN GLARGINE 21 UNIT(S): 100 INJECTION, SOLUTION SUBCUTANEOUS at 09:47

## 2018-12-26 RX ADMIN — Medication 10 UNIT(S): at 18:24

## 2018-12-26 RX ADMIN — Medication 7 UNIT(S): at 09:46

## 2018-12-26 RX ADMIN — Medication 1 MILLIGRAM(S): at 18:46

## 2018-12-26 RX ADMIN — INSULIN GLARGINE 3 UNIT(S): 100 INJECTION, SOLUTION SUBCUTANEOUS at 13:42

## 2018-12-26 NOTE — PROGRESS NOTE ADULT - PROBLEM SELECTOR PLAN 2
Likely in setting of poorly uncontrolled DM and non-compliance with diet. r/o infectious source. UA negative. CXR clear.   -AG closed  -Maintain K >4  -F/U A1C  -Endo consult  --Lantus 21 at bedtime; Humalog 7 premeal Likely in setting of poorly uncontrolled DM and non-compliance with diet. r/o infectious source. UA negative. CXR clear.   -AG closed  -Maintain K >4  -F/U A1C  -Endo consult  -- Increase to Lantus 24 at bedtime; Humalog 10 premeal  --F/U LIANNA and Islet cells; C-peptide

## 2018-12-26 NOTE — DIETITIAN INITIAL EVALUATION ADULT. - OTHER INFO
Patient seen for Nutrition Services- Assessment Education. Per chart: Patient is a 28 year old male with history of uncontrolled DM, intellectual disability, bipolar d/o schizophrenia presented with chest pain. Per PCA at bedside- patient has been eating well. Denies any nausea/vomiting/diarrhea/constipation or difficulty chewing and swallowing. No allergies or intolerances noted in chart. Per previous RDN note 5/28, 2017 weight: 176 pounds. Current weight: 181.8 pounds. No edema noted in chart.

## 2018-12-26 NOTE — PROGRESS NOTE ADULT - PROBLEM SELECTOR PLAN 1
Patient admitted for DKA. patient MUST ALWAYS receive basal insulin to prevent return of DKA.   -given AM glucose of 192  increase Lantus to 24 daily  (give 3 units now)  and lispro to 10 units daily      Would also clarify type 1 vs. 2 DM. followup LIANNA and islet cell antibodies.  check C-peptide   nutrition consult,   pt will need insulin at dc (please start insulin teaching, glucometer use, and teachback)  He will need outpatient endocrine follow up [phone].

## 2018-12-26 NOTE — PROGRESS NOTE ADULT - ASSESSMENT
28M with PMH of poorly controlled T2DM, intellectual disability, bipolar d/o, schizophrenia p/w severe midsternal chest pain a/w NBNB emesis. Found to have DKA with Hgb A1c 13.8%.     #Chest pain  - TTE with normal LV function  - Currently chest pain free  - S/p heparin gtt for total of 48 hours  - Continue ASA, atorvastatin  - Continue Brilinta 90 mg BID for one more dose tonight, then switch to Plavix 75 mg daily  - Discontinue metoprolol, start lisinopril 5 mg daily  - No further cardiac w/u at this time    To be discussed with attending.     JARED Weber  Cardiology Fellow  [phone]

## 2018-12-26 NOTE — DIETITIAN INITIAL EVALUATION ADULT. - ENERGY NEEDS
Ht: No height in chart. Per previous RDN note 62 inches   Wt: 181.8 pounds  BMI: 33.1 kg/m2     Edema: no edema noted. Skin: intact, no pressure injuries noted

## 2018-12-26 NOTE — PROGRESS NOTE ADULT - SUBJECTIVE AND OBJECTIVE BOX
Chief Complaint/Follow-up on: DM    Subjective:  POC blood glucose and insulin use reviewed.  yesterday with labile blood gluose and hyperglycemia, likely due to Lantus being given late, needing correction 10 -12 units with breakfast and dinner  this am glucose 192  eating   no n/v     MEDICATIONS  (STANDING):  aspirin  chewable 81 milliGRAM(s) Oral daily  atorvastatin 80 milliGRAM(s) Oral at bedtime  benztropine 1 milliGRAM(s) Oral two times a day  dextrose 5%. 1000 milliLiter(s) (50 mL/Hr) IV Continuous <Continuous>  dextrose 50% Injectable 12.5 Gram(s) IV Push once  dextrose 50% Injectable 25 Gram(s) IV Push once  dextrose 50% Injectable 25 Gram(s) IV Push once  diVALproex  milliGRAM(s) Oral two times a day  enoxaparin Injectable 40 milliGRAM(s) SubCutaneous every 24 hours  haloperidol     Tablet 20 milliGRAM(s) Oral at bedtime  haloperidol    Injectable 5 milliGRAM(s) IV Push once  insulin glargine Injectable (LANTUS) 21 Unit(s) SubCutaneous every morning  insulin lispro (HumaLOG) corrective regimen sliding scale   SubCutaneous three times a day before meals  insulin lispro (HumaLOG) corrective regimen sliding scale   SubCutaneous at bedtime  insulin lispro Injectable (HumaLOG) 7 Unit(s) SubCutaneous three times a day before meals  metoprolol tartrate 25 milliGRAM(s) Oral two times a day  ticagrelor 90 milliGRAM(s) Oral two times a day    MEDICATIONS  (PRN):  dextrose 40% Gel 15 Gram(s) Oral once PRN Blood Glucose LESS THAN 70 milliGRAM(s)/deciliter  glucagon  Injectable 1 milliGRAM(s) IntraMuscular once PRN Glucose LESS THAN 70 milligrams/deciliter  LORazepam   Injectable 2 milliGRAM(s) IntraMuscular every 6 hours PRN Agitation      PHYSICAL EXAM:  VITALS: T(C): 36.4 (12-26-18 @ 05:21)  T(F): 97.5 (12-26-18 @ 05:21), Max: 97.8 (12-25-18 @ 21:28)  HR: 91 (12-26-18 @ 05:21) (91 - 107)  BP: 94/52 (12-26-18 @ 05:21) (94/52 - 127/84)  RR:  (18 - 18)  SpO2:  (99% - 100%)  Wt(kg): --  GENERAL: NAD, well-groomed, well-developed  EYES: No proptosis, no injection  HEENT:  Atraumatic, Normocephalic, moist mucous membranes  RESPIRATORY: Clear to auscultation bilaterally; No rales, rhonchi, wheezing, or rubs  CARDIOVASCULAR: Regular rate and rhythm; No murmurs; no peripheral edema  GI: Soft, nontender, non distended, normal bowel sounds      POCT Blood Glucose.: 192 mg/dL (12-26-18 @ 09:12)  POCT Blood Glucose.: 203 mg/dL (12-25-18 @ 21:37)  POCT Blood Glucose.: 421 mg/dL (12-25-18 @ 17:41)  POCT Blood Glucose.: 148 mg/dL (12-25-18 @ 13:18)  POCT Blood Glucose.: 321 mg/dL (12-25-18 @ 11:30)  POCT Blood Glucose.: 369 mg/dL (12-25-18 @ 09:18)  POCT Blood Glucose.: 274 mg/dL (12-25-18 @ 05:12)  POCT Blood Glucose.: 120 mg/dL (12-24-18 @ 23:25)  POCT Blood Glucose.: 187 mg/dL (12-24-18 @ 18:12)  POCT Blood Glucose.: 250 mg/dL (12-24-18 @ 13:59)  POCT Blood Glucose.: 249 mg/dL (12-24-18 @ 13:03)  POCT Blood Glucose.: 242 mg/dL (12-24-18 @ 09:02)  POCT Blood Glucose.: 298 mg/dL (12-24-18 @ 06:28)  POCT Blood Glucose.: 343 mg/dL (12-24-18 @ 01:25)  POCT Blood Glucose.: 354 mg/dL (12-23-18 @ 22:48)  POCT Blood Glucose.: 448 mg/dL (12-23-18 @ 21:35)  POCT Blood Glucose.: 304 mg/dL (12-23-18 @ 16:03)    12-26    138  |  102  |  11  ----------------------------<  191<H>  3.5   |  24  |  0.69    EGFR if : 151  EGFR if non : 130    Ca    9.2      12-26  Mg     1.5     12-26  Phos  4.7     12-26    TPro  6.4  /  Alb  3.6  /  TBili  0.5  /  DBili  x   /  AST  12  /  ALT  10  /  AlkPhos  59  12-26          Thyroid Function Tests:      Hemoglobin A1C, Whole Blood: 13.8 % <H> [4.0 - 5.6] (12-24-18 @ 07:15)

## 2018-12-26 NOTE — PROGRESS NOTE ADULT - PROBLEM SELECTOR PLAN 1
Type II NSTEMI in the setting of DKA; Cardiac enzymes were elevated with HS trop 913, , 35.27. EKG w/ NSR and no LISETTE  -S/P Aspirin an Brilinta load   -c/w Asp/ Brilinta/ Lopressor  -c/w heparin drip  -trend trops  -c/w Atorvastatin  -F/U TTE  -Cardiology consulted, recs appreciated;  ----no plan for cath at this time Type II NSTEMI in the setting of DKA; Cardiac enzymes were elevated with HS trop 913, , 35.27. EKG w/ NSR and no LISETTE  -S/P Aspirin an Brilinta load   -c/w Asp  -C/w Brilinta for today, and switch to Plavix 75mg daily  -discontinued metoprolol and started lisinopril 5mg daily   -c/w heparin drip  -trend trops  -c/w Atorvastatin  -F/U TTE  -Cardiology consulted, recs appreciated;  ----no plan for cath at this time

## 2018-12-26 NOTE — PROGRESS NOTE ADULT - SUBJECTIVE AND OBJECTIVE BOX
Patient seen and examined at bedside.    Overnight Events: Denies chest pain. States he is fine and wants to go home.       REVIEW OF SYSTEMS:  Unable to obtain, patient uncooperative     Current Meds:  aspirin  chewable 81 milliGRAM(s) Oral daily  atorvastatin 80 milliGRAM(s) Oral at bedtime  benztropine 1 milliGRAM(s) Oral two times a day  dextrose 40% Gel 15 Gram(s) Oral once PRN  dextrose 5%. 1000 milliLiter(s) IV Continuous <Continuous>  dextrose 50% Injectable 12.5 Gram(s) IV Push once  dextrose 50% Injectable 25 Gram(s) IV Push once  dextrose 50% Injectable 25 Gram(s) IV Push once  diVALproex  milliGRAM(s) Oral two times a day  enoxaparin Injectable 40 milliGRAM(s) SubCutaneous every 24 hours  glucagon  Injectable 1 milliGRAM(s) IntraMuscular once PRN  haloperidol     Tablet 20 milliGRAM(s) Oral at bedtime  haloperidol    Injectable 5 milliGRAM(s) IV Push once  insulin glargine Injectable (LANTUS) 21 Unit(s) SubCutaneous every morning  insulin lispro (HumaLOG) corrective regimen sliding scale   SubCutaneous three times a day before meals  insulin lispro (HumaLOG) corrective regimen sliding scale   SubCutaneous at bedtime  insulin lispro Injectable (HumaLOG) 7 Unit(s) SubCutaneous three times a day before meals  LORazepam   Injectable 2 milliGRAM(s) IntraMuscular every 6 hours PRN  metoprolol tartrate 25 milliGRAM(s) Oral two times a day  ticagrelor 90 milliGRAM(s) Oral two times a day      PAST MEDICAL & SURGICAL HISTORY:  Schizo-affective schizophrenia  Bipolar 1 disorder  Obesity  Scoliosis  Mental retardation  No significant past surgical history      Vitals:  T(F): 97.5 (12-26), Max: 97.8 (12-25)  HR: 91 (12-) (91 - 107)  BP: 94/52 (12-) (94/52 - 127/84)  RR: 18 (12-26)  SpO2: 99% (12-)  I&O's Summary      Physical Exam:  Appearance: No acute distress; well appearing  Eyes: PERRL, EOMI, pink conjunctiva  HENT: Normal oral mucosa  Cardiovascular: RRR, S1, S2, no murmurs, rubs, or gallops; no edema; no JVD  Respiratory: Clear to auscultation bilaterally  Gastrointestinal: soft, non-tender, non-distended with normal bowel sounds  Musculoskeletal: No clubbing; no joint deformity   Neurologic: Non-focal  Lymphatic: No lymphadenopathy  Psychiatry: AAOx3, mood & affect appropriate  Skin: No rashes, ecchymoses, or cyanosis                          13.8   9.51  )-----------( 245      ( 26 Dec 2018 05:45 )             42.8         138  |  102  |  11  ----------------------------<  191<H>  3.5   |  24  |  0.69    Ca    9.2      26 Dec 2018 05:45  Phos  4.7       Mg     1.5         TPro  6.4  /  Alb  3.6  /  TBili  0.5  /  DBili  x   /  AST  12  /  ALT  10  /  AlkPhos  59      PTT - ( 25 Dec 2018 05:10 )  PTT:76.2 SEC    Total Cholesterol: 185  LDL: 150  HDL: 36  T      HS trop peaked at 1692  CK peaked at 644  CKMB peaked at 44    Hemoglobin A1C, Whole Blood: 13.8 % ( @ 07:15)    < from: Transthoracic Echocardiogram (.18 @ 09:47) >  DIMENSIONS:  Dimensions:     Normal Values:  LA:     2.9 cm    2.0 - 4.0 cm  Ao:     2.7 cm    2.0 - 3.8 cm  SEPTUM: 0.9 cm    0.6 - 1.2 cm  PWT: 0.9 cm    0.6 - 1.1 cm  LVIDd:  4.1 cm    3.0 - 5.6 cm  LVIDs:  3.0 cm    1.8 - 4.0 cm  Derived Variables:  LVMI: 56 g/m2  RWT: 0.43  Fractional short: 27 %  Ejection Fraction (Berman Rule): 60 %  ------------------------------------------------------------------------  OBSERVATIONS:  Mitral Valve: Normal mitral valve.  Aortic Root: Normal aortic root.  Aortic Valve: Normal trileaflet aortic valve.  Left Atrium: Normal left atrium.  Left Ventricle: Normal left ventricular systolic function.  Normal left ventricular internal dimensions and wall  thicknesses.  Right Heart: Normal right atrium. Normal right ventricular  size and function. Normal tricuspid valve. Normal pulmonic  valve.  Pericardium/PleuraNormal pericardium with no pericardial  effusion.  ------------------------------------------------------------------------  CONCLUSIONS:  1. Normal left ventricular internal dimensions and wall  thicknesses.  2. Normal left ventricular systolic function.    < end of copied text >    Interpretation of Telemetry: NSR, one episode of sinus tach overnight

## 2018-12-26 NOTE — DIETITIAN INITIAL EVALUATION ADULT. - ADHERENCE
Patient noted with history of uncontrolled DM. Per H&P- patient noncompliant with diabetic medications (for an unknown time period). 12/24 Hba1c: 13.8%

## 2018-12-26 NOTE — DIETITIAN INITIAL EVALUATION ADULT. - PERTINENT MEDS FT
MEDICATIONS  (STANDING):  aspirin  chewable 81 milliGRAM(s) Oral daily  atorvastatin 80 milliGRAM(s) Oral at bedtime  benztropine 1 milliGRAM(s) Oral two times a day  dextrose 5%. 1000 milliLiter(s) (50 mL/Hr) IV Continuous <Continuous>  dextrose 50% Injectable 12.5 Gram(s) IV Push once  dextrose 50% Injectable 25 Gram(s) IV Push once  dextrose 50% Injectable 25 Gram(s) IV Push once  diVALproex  milliGRAM(s) Oral two times a day  enoxaparin Injectable 40 milliGRAM(s) SubCutaneous every 24 hours  haloperidol     Tablet 20 milliGRAM(s) Oral at bedtime  haloperidol    Injectable 5 milliGRAM(s) IV Push once  insulin glargine Injectable (LANTUS) 21 Unit(s) SubCutaneous every morning  insulin lispro (HumaLOG) corrective regimen sliding scale   SubCutaneous three times a day before meals  insulin lispro (HumaLOG) corrective regimen sliding scale   SubCutaneous at bedtime  insulin lispro Injectable (HumaLOG) 7 Unit(s) SubCutaneous three times a day before meals  metoprolol tartrate 25 milliGRAM(s) Oral two times a day  ticagrelor 90 milliGRAM(s) Oral two times a day    MEDICATIONS  (PRN):  dextrose 40% Gel 15 Gram(s) Oral once PRN Blood Glucose LESS THAN 70 milliGRAM(s)/deciliter  glucagon  Injectable 1 milliGRAM(s) IntraMuscular once PRN Glucose LESS THAN 70 milligrams/deciliter  LORazepam   Injectable 2 milliGRAM(s) IntraMuscular every 6 hours PRN Agitation

## 2018-12-26 NOTE — DIETITIAN INITIAL EVALUATION ADULT. - NS AS NUTRI INTERV ED CONTENT
Unable to provide diet education at this time due to patients cognitive status + no family at bedside.  RDN to follow up with family regarding education as schedule permits.

## 2018-12-26 NOTE — DIETITIAN INITIAL EVALUATION ADULT. - PERTINENT LABORATORY DATA
12-26 Na138 mmol/L Glu 191 mg/dL<H> K+ 3.5 mmol/L Cr  0.69 mg/dL BUN 11 mg/dL 12-26 Phos 4.7 mg/dL<H> 12-26 Alb 3.6 g/dL 12-24 [id number] 13.8 %<H> 12-26 Chol 185 mg/dL  mg/dL HDL 36 mg/dL Trig 71 mg/dL, POCT: 192, 203, 421, 148 mg/dL

## 2018-12-26 NOTE — PROGRESS NOTE ADULT - SUBJECTIVE AND OBJECTIVE BOX
----------------------------------------------------------------  Abdoulmelissa Dubois PGY1  Internal Medicine   [phone]  ----------------------------------------------------------------    Interval Events: No acute event overnight. Patient is seen and examined at the bedside this morning.   Denied CP, shortness of breath. No nausea or vomiting.    OBJECTIVE:  Vital Signs Last 24 Hrs  T(C): 36.4 (26 Dec 2018 05:21), Max: 36.6 (25 Dec 2018 21:28)  T(F): 97.5 (26 Dec 2018 05:21), Max: 97.8 (25 Dec 2018 21:28)  HR: 91 (26 Dec 2018 05:21) (91 - 107)  BP: 94/52 (26 Dec 2018 05:21) (94/52 - 127/84)  BP(mean): --  RR: 18 (26 Dec 2018 05:21) (18 - 18)  SpO2: 99% (26 Dec 2018 05:21) (99% - 100%)    CAPILLARY BLOOD GLUCOSE      POCT Blood Glucose.: 203 mg/dL (25 Dec 2018 21:37)      PHYSICAL EXAM:  General:  man in NAD.   HEENT:  PERRL, EOMI	  Cardiovascular: increased heart rate, regular rhythm, Normal S1 S2, No JVD, No murmurs appreciated  Respiratory: Lungs clear to auscultation	  Gastrointestinal:  Soft, Non-tender, + BS	  Skin: No rashes, No ecchymoses, No cyanosis	  Extremities: Normal range of motion, No clubbing, cyanosis or edema  Vascular: Peripheral pulses palpable 2+ bilaterally      HOSPITAL MEDICATIONS:  aspirin  chewable 81 milliGRAM(s) Oral daily  enoxaparin Injectable 40 milliGRAM(s) SubCutaneous every 24 hours  ticagrelor 90 milliGRAM(s) Oral two times a day  metoprolol tartrate 25 milliGRAM(s) Oral two times a day  atorvastatin 80 milliGRAM(s) Oral at bedtime  dextrose 40% Gel 15 Gram(s) Oral once PRN  dextrose 50% Injectable 12.5 Gram(s) IV Push once  dextrose 50% Injectable 25 Gram(s) IV Push once  dextrose 50% Injectable 25 Gram(s) IV Push once  glucagon  Injectable 1 milliGRAM(s) IntraMuscular once PRN  insulin glargine Injectable (LANTUS) 21 Unit(s) SubCutaneous every morning  insulin lispro (HumaLOG) corrective regimen sliding scale   SubCutaneous three times a day before meals  insulin lispro (HumaLOG) corrective regimen sliding scale   SubCutaneous at bedtime  insulin lispro Injectable (HumaLOG) 7 Unit(s) SubCutaneous three times a day before meals  benztropine 1 milliGRAM(s) Oral two times a day  diVALproex  milliGRAM(s) Oral two times a day  haloperidol     Tablet 20 milliGRAM(s) Oral at bedtime  haloperidol    Injectable 5 milliGRAM(s) IV Push once  LORazepam   Injectable 2 milliGRAM(s) IntraMuscular every 6 hours PRN  dextrose 5%. 1000 milliLiter(s) IV Continuous <Continuous>  potassium chloride    Tablet ER 40 milliEquivalent(s) Oral once            LABS:                        13.8   9.51  )-----------( 245      ( 26 Dec 2018 05:45 )             42.8     Hgb Trend: 13.8<--, 14.4<--, 14.5<--, 15.2<--, 14.3<--  12-26    138  |  102  |  11  ----------------------------<  191<H>  3.5   |  24  |  0.69    Ca    9.2      26 Dec 2018 05:45  Phos  4.7     12-26  Mg     1.5     12-26    TPro  6.4  /  Alb  3.6  /  TBili  0.5  /  DBili  x   /  AST  12  /  ALT  10  /  AlkPhos  59  12-26    Creatinine Trend: 0.69<--, 0.74<--, 0.80<--, 0.61<--, 0.60<--, 0.56<--  PTT - ( 25 Dec 2018 05:10 )  PTT:76.2 SEC

## 2018-12-26 NOTE — PROGRESS NOTE ADULT - ASSESSMENT
28 year old man history of uncontrolled T2DM on oral Metformin, intellectual disability, bipolar d/o, schizophrenia presents to the ED on 12/22 with complaints of chest pain with Type II NSTEMI w/o indication for cath.  Also presented with NBNB; found to be with elevated glucose with concern for mild DKA; now resolved.

## 2018-12-26 NOTE — DIETITIAN INITIAL EVALUATION ADULT. - PROBLEM SELECTOR PLAN 1
Acute onset; Cardiac enzymes were elevated with HS trop 913, , 35.27. EKG w/ NSR and no LISETTE  As per cards; likely demand ischemia in the setting of DKA  -S/P Aspirin an Brilinta load   -c/w Asp/ Brilinta/ Lopressor  -c/w heparin drip  -trend trops  -c/w Atorvastatin  -F/U TTE  -Cardiology consulted, recs appreciated;  ----plan for cardiac cath...Monday?(non-urgent)

## 2018-12-26 NOTE — DIETITIAN INITIAL EVALUATION ADULT. - PROBLEM SELECTOR PLAN 2
Likely in setting of poorly uncontrolled DM and non-compliance with diet. r/o infectious source. UA negative. CXR clear.   s/p Humalog 5U, Insulin gtt, 2L NS Bolus and 2L LR  -AG closing and transitioned of insulin gtt  -BMP q4  -Maintain K >4  -F/U A1C  -SSI, FS; will give lantus 10 in the AM; will dose premeal and bedtime insulin starting tomorrow (  -Endo consult in the AM

## 2018-12-27 DIAGNOSIS — F31.9 BIPOLAR DISORDER, UNSPECIFIED: ICD-10-CM

## 2018-12-27 LAB
BUN SERPL-MCNC: 10 MG/DL — SIGNIFICANT CHANGE UP (ref 7–23)
CALCIUM SERPL-MCNC: 9.7 MG/DL — SIGNIFICANT CHANGE UP (ref 8.4–10.5)
CHLORIDE SERPL-SCNC: 99 MMOL/L — SIGNIFICANT CHANGE UP (ref 98–107)
CO2 SERPL-SCNC: 20 MMOL/L — LOW (ref 22–31)
CREAT SERPL-MCNC: 0.73 MG/DL — SIGNIFICANT CHANGE UP (ref 0.5–1.3)
GLUCOSE BLDC GLUCOMTR-MCNC: 151 MG/DL — HIGH (ref 70–99)
GLUCOSE BLDC GLUCOMTR-MCNC: 156 MG/DL — HIGH (ref 70–99)
GLUCOSE BLDC GLUCOMTR-MCNC: 170 MG/DL — HIGH (ref 70–99)
GLUCOSE BLDC GLUCOMTR-MCNC: 369 MG/DL — HIGH (ref 70–99)
GLUCOSE BLDC GLUCOMTR-MCNC: 76 MG/DL — SIGNIFICANT CHANGE UP (ref 70–99)
GLUCOSE BLDC GLUCOMTR-MCNC: 82 MG/DL — SIGNIFICANT CHANGE UP (ref 70–99)
GLUCOSE SERPL-MCNC: 284 MG/DL — HIGH (ref 70–99)
HCT VFR BLD CALC: 44.7 % — SIGNIFICANT CHANGE UP (ref 39–50)
HGB BLD-MCNC: 15 G/DL — SIGNIFICANT CHANGE UP (ref 13–17)
MAGNESIUM SERPL-MCNC: 1.7 MG/DL — SIGNIFICANT CHANGE UP (ref 1.6–2.6)
MCHC RBC-ENTMCNC: 27.4 PG — SIGNIFICANT CHANGE UP (ref 27–34)
MCHC RBC-ENTMCNC: 33.6 % — SIGNIFICANT CHANGE UP (ref 32–36)
MCV RBC AUTO: 81.6 FL — SIGNIFICANT CHANGE UP (ref 80–100)
NRBC # FLD: 0 — SIGNIFICANT CHANGE UP
PHOSPHATE SERPL-MCNC: 4.1 MG/DL — SIGNIFICANT CHANGE UP (ref 2.5–4.5)
PLATELET # BLD AUTO: 239 K/UL — SIGNIFICANT CHANGE UP (ref 150–400)
PMV BLD: 11.1 FL — SIGNIFICANT CHANGE UP (ref 7–13)
POTASSIUM SERPL-MCNC: 4 MMOL/L — SIGNIFICANT CHANGE UP (ref 3.5–5.3)
POTASSIUM SERPL-SCNC: 4 MMOL/L — SIGNIFICANT CHANGE UP (ref 3.5–5.3)
RBC # BLD: 5.48 M/UL — SIGNIFICANT CHANGE UP (ref 4.2–5.8)
RBC # FLD: 12.7 % — SIGNIFICANT CHANGE UP (ref 10.3–14.5)
SODIUM SERPL-SCNC: 135 MMOL/L — SIGNIFICANT CHANGE UP (ref 135–145)
WBC # BLD: 10.18 K/UL — SIGNIFICANT CHANGE UP (ref 3.8–10.5)
WBC # FLD AUTO: 10.18 K/UL — SIGNIFICANT CHANGE UP (ref 3.8–10.5)

## 2018-12-27 PROCEDURE — 99233 SBSQ HOSP IP/OBS HIGH 50: CPT | Mod: GC

## 2018-12-27 PROCEDURE — 99232 SBSQ HOSP IP/OBS MODERATE 35: CPT

## 2018-12-27 PROCEDURE — 99233 SBSQ HOSP IP/OBS HIGH 50: CPT

## 2018-12-27 RX ORDER — SODIUM CHLORIDE 9 MG/ML
1000 INJECTION, SOLUTION INTRAVENOUS ONCE
Qty: 0 | Refills: 0 | Status: COMPLETED | OUTPATIENT
Start: 2018-12-27 | End: 2018-12-27

## 2018-12-27 RX ORDER — DIPHENHYDRAMINE HCL 50 MG
50 CAPSULE ORAL ONCE
Qty: 0 | Refills: 0 | Status: COMPLETED | OUTPATIENT
Start: 2018-12-27 | End: 2018-12-27

## 2018-12-27 RX ORDER — HALOPERIDOL DECANOATE 100 MG/ML
5 INJECTION INTRAMUSCULAR ONCE
Qty: 0 | Refills: 0 | Status: COMPLETED | OUTPATIENT
Start: 2018-12-27 | End: 2018-12-27

## 2018-12-27 RX ORDER — DEXTROSE 50 % IN WATER 50 %
25 SYRINGE (ML) INTRAVENOUS ONCE
Qty: 0 | Refills: 0 | Status: COMPLETED | OUTPATIENT
Start: 2018-12-27 | End: 2018-12-27

## 2018-12-27 RX ADMIN — DIVALPROEX SODIUM 500 MILLIGRAM(S): 500 TABLET, DELAYED RELEASE ORAL at 05:47

## 2018-12-27 RX ADMIN — ENOXAPARIN SODIUM 40 MILLIGRAM(S): 100 INJECTION SUBCUTANEOUS at 12:44

## 2018-12-27 RX ADMIN — Medication 1 MILLIGRAM(S): at 05:47

## 2018-12-27 RX ADMIN — LISINOPRIL 5 MILLIGRAM(S): 2.5 TABLET ORAL at 05:47

## 2018-12-27 RX ADMIN — Medication 10: at 09:02

## 2018-12-27 RX ADMIN — SODIUM CHLORIDE 2000 MILLILITER(S): 9 INJECTION, SOLUTION INTRAVENOUS at 15:49

## 2018-12-27 RX ADMIN — HALOPERIDOL DECANOATE 5 MILLIGRAM(S): 100 INJECTION INTRAMUSCULAR at 09:42

## 2018-12-27 RX ADMIN — Medication 25 MILLILITER(S): at 17:45

## 2018-12-27 RX ADMIN — Medication 50 MILLIGRAM(S): at 09:42

## 2018-12-27 RX ADMIN — Medication 2 MILLIGRAM(S): at 23:27

## 2018-12-27 RX ADMIN — Medication 2: at 12:46

## 2018-12-27 RX ADMIN — INSULIN GLARGINE 24 UNIT(S): 100 INJECTION, SOLUTION SUBCUTANEOUS at 09:02

## 2018-12-27 RX ADMIN — Medication 10 UNIT(S): at 09:02

## 2018-12-27 RX ADMIN — Medication 2 MILLIGRAM(S): at 09:41

## 2018-12-27 NOTE — PROGRESS NOTE BEHAVIORAL HEALTH - NSBHFUPINTERVALHXFT_PSY_A_CORE
Met with the patient. Sleeping, does not wake up. Calm, no distress. No restraints.   Discussed case with primary team and gave below reccs. Patient presented with DKA, and insulin administration and finger sticks are a trigger for his agitation.     Upon review of records from [facility], noted that patient was on Haldol decanoate 150mg IM in 2016 which was discontinued as it was a trigger for his agitation back then, patient had been compliant with PO meds, and had been refusing CORTEZ. He has since then been on just Haldol PO, and essentially had been stable on his current regimen.

## 2018-12-27 NOTE — PROVIDER CONTACT NOTE (OTHER) - BACKGROUND
patient received meds to calm him down this am after being combative and aggressive, now lethargic, sleeping. patient also with no sleep from the previous night.

## 2018-12-27 NOTE — PROGRESS NOTE ADULT - PROBLEM SELECTOR PLAN 2
Likely in setting of poorly uncontrolled DM and non-compliance with diet. r/o infectious source. UA negative. CXR clear.   -AG closed  -Maintain K >4  -F/U A1C  -Endo consult  -- Increase to Lantus 24 at bedtime; Humalog 10 premeal  --F/U LIANNA and Islet cells; C-peptide Likely in setting of poorly uncontrolled DM and non-compliance with diet. r/o infectious source. UA negative. CXR clear.   -AG closed  -Maintain K >4  -F/U A1C  -Endo consult  -- c/w Lantus 24 at bedtime; Humalog 10 premeal  --F/U LIANNA and Islet cells; C-peptide

## 2018-12-27 NOTE — PROGRESS NOTE ADULT - PROBLEM SELECTOR PLAN 1
Type II NSTEMI in the setting of DKA; Cardiac enzymes were elevated with HS trop 913, , 35.27. EKG w/ NSR and no LISETTE  -S/P Aspirin an Brilinta load   -c/w Asp  -C/w Brilinta for today, and switch to Plavix 75mg daily  -discontinued metoprolol and started lisinopril 5mg daily   -c/w heparin drip  -trend trops  -c/w Atorvastatin  -F/U TTE  -Cardiology consulted, recs appreciated;  ----no plan for cath at this time Type II NSTEMI in the setting of DKA; Cardiac enzymes were elevated with HS trop 913, , 35.27. EKG w/ NSR and no LISETTE  -S/P Aspirin an Brilinta load   -c/w Asp  -C/w Plavix 75mg daily  -c/w lisinopril 5mg daily   -c/w heparin drip  -trend trops  -c/w Atorvastatin  -F/U TTE  -Cardiology consulted, recs appreciated;  ----no plan for cath at this time

## 2018-12-27 NOTE — PROGRESS NOTE BEHAVIORAL HEALTH - SUMMARY
Patient is a 27 yo single never  man of [place] descent with no dependents, domiciled at home with parents who are his caregivers, unemployed, diagnosed with bipolar disorder, Intermittent Explosive Disorder, intellectual disability (IQ=40), multiple inpatient psychiatric admissions last to [facility] in Sept 29 2015 to Oct 7 2015, currently in [facility] with NP Nury Pabon; he has a long history of aggression toward family and chronic poor frustration tolerance; no prior suicide attempts, no legal problems, no substance use, no abuse history, has PMH of DM2, who has been admitted for chest pain. Psychiatry was consulted to assess his agitation.    12/27: Agitated, aggressive this morning, likely in the context of his poor frustration tolerance to finger sticks and insulin injections.

## 2018-12-27 NOTE — PROGRESS NOTE ADULT - PROBLEM SELECTOR PLAN 1
Patient admitted for DKA. patient MUST ALWAYS receive basal insulin to prevent return of DKA.   For now, continue with Lantus to 24 daily, patient gets the does every morning.   Continue with lispro 10 units tid with meals  Continue with moderate does sliding scale qac/hs.      Would also clarify type 1 vs. 2 DM. followup LIANNA and islet cell antibodies.  check C-peptide   Patient will need insulin at dc (please start insulin teaching, glucometer use, and teachback)  He will need outpatient endocrine follow up [phone].

## 2018-12-27 NOTE — PROGRESS NOTE ADULT - SUBJECTIVE AND OBJECTIVE BOX
----------------------------------------------------------------  Sonam Dubois PGY1  Internal Medicine   [phone]  ----------------------------------------------------------------    CHIEF COMPLAINT:     Interval Events: No acute event overnight. Patient is seen and examined at the bedside this morning.     REVIEW OF SYSTEMS:  Constitutional: No fever, or chills. No recent weight loss or weight gain.   HEENT: No dry eyes or eye irritation. No postnasal drip or nasal congestion.  CV: No chest pain, or palpitations. No orthopnea.   Resp: No cough, or sputum production. No shortness of breath or dyspnea on exertion.   GI: No nausea or vomiting. No diarrhea or constipation. No abdominal pain.   : No dysuria, no nocturia or increased urinary frequency.  Musculoskeletal: No back pain, no myalgias  Skin: No rash or itchiness.   Neurological: No headache or dizziness. No syncope, no weakness, numbness.  Psychiatric: Denies depressed mood.   Endocrine: No cold or heat intolerance. No dry skin.  Hematologic/Lymphatic: No anemia or bleeding problem.     OBJECTIVE:  Vital Signs Last 24 Hrs  T(C): 36.4 (27 Dec 2018 05:29), Max: 36.6 (26 Dec 2018 14:58)  T(F): 97.5 (27 Dec 2018 05:29), Max: 97.9 (26 Dec 2018 14:58)  HR: 94 (27 Dec 2018 05:29) (94 - 102)  BP: 126/87 (27 Dec 2018 05:29) (101/70 - 129/83)  BP(mean): --  RR: 18 (27 Dec 2018 05:29) (18 - 18)  SpO2: 99% (27 Dec 2018 05:29) (99% - 100%)    CAPILLARY BLOOD GLUCOSE      POCT Blood Glucose.: 91 mg/dL (26 Dec 2018 21:34)      PHYSICAL EXAM:  General: Alert and cooperative. Not in acute stress. Well developed, well nourished.   Head: Normocephalic, no mass and lesions.  Eyes: Intact visual fields. PERRLA, clear conjunctiva. EOMI, no ptosis.   Throat: Oral cavity and pharynx normal. No inflammation, swelling, exudate, or lesions. Teeth and gingiva in good general condition.  Neck: No lymphadenopathy, no masses, no thyromegaly. Carotid pulses 2+. No JVD.   Respiratory: Bilateral lung clear to auscultation, no crackles, no wheezes, no rhonchi.   Cardiovascular: S1/S2 auscultated, no murmur, or gallop. Rhythm is regular. There is no peripheral edema, cyanosis or pallor. Extremities are warm and well perfused. Capillary refill is less than 2 seconds. No carotid bruits.  Abdomen: Soft, non-tender, nondistended, no guarding or rebound tenderness. Active bowel sounds in all 4 quadrants. No hepatosplenomegaly.   Musculoskeletal: Adequately aligned spine. ROM intact spine and extremities. No joint erythema or tenderness. Normal muscular development. Normal gait.   Extremities: No significant deformity or joint abnormality. Peripheral pulses intact. No varicosities. No peripheral edema, atrophy.   Skin: Intact, no rash. Normal color, texture and turgor with no lesions or eruptions.  Neurological: AOAx4. CN2-12 grosslly intact. Strength and sensation symmetric and intact throughout. Reflexes 2+ throughout. Cerebellar testing normal.  Psychiatry: The mental examination revealed the patient was oriented to person, place, and time. The patient was able to demonstrate good judgement and reason, without hallucinations, abnormal affect or abnormal behaviors during the examination. Patient is not suicidal.     LINES:    HOSPITAL MEDICATIONS:  aspirin  chewable 81 milliGRAM(s) Oral daily  clopidogrel Tablet 75 milliGRAM(s) Oral daily  enoxaparin Injectable 40 milliGRAM(s) SubCutaneous every 24 hours      lisinopril 5 milliGRAM(s) Oral daily    atorvastatin 80 milliGRAM(s) Oral at bedtime  dextrose 40% Gel 15 Gram(s) Oral once PRN  dextrose 50% Injectable 12.5 Gram(s) IV Push once  dextrose 50% Injectable 25 Gram(s) IV Push once  dextrose 50% Injectable 25 Gram(s) IV Push once  glucagon  Injectable 1 milliGRAM(s) IntraMuscular once PRN  insulin glargine Injectable (LANTUS) 24 Unit(s) SubCutaneous every morning  insulin lispro (HumaLOG) corrective regimen sliding scale   SubCutaneous three times a day before meals  insulin lispro (HumaLOG) corrective regimen sliding scale   SubCutaneous at bedtime  insulin lispro Injectable (HumaLOG) 10 Unit(s) SubCutaneous three times a day before meals      benztropine 1 milliGRAM(s) Oral two times a day  diVALproex  milliGRAM(s) Oral two times a day  haloperidol     Tablet 20 milliGRAM(s) Oral at bedtime  haloperidol    Injectable 5 milliGRAM(s) IV Push once  LORazepam   Injectable 2 milliGRAM(s) IV Push every 6 hours PRN          dextrose 5%. 1000 milliLiter(s) IV Continuous <Continuous>            LABS:                        15.0   10.18 )-----------( 239      ( 27 Dec 2018 05:34 )             44.7     Hgb Trend: 15.0<--, 13.8<--, 14.4<--, 14.5<--, 15.2<--  12-27    135  |  99  |  10  ----------------------------<  284<H>  4.0   |  20<L>  |  0.73    Ca    9.7      27 Dec 2018 05:34  Phos  4.1     12-27  Mg     1.7     12-27    TPro  6.4  /  Alb  3.6  /  TBili  0.5  /  DBili  x   /  AST  12  /  ALT  10  /  AlkPhos  59  12-26    Creatinine Trend: 0.73<--, 0.69<--, 0.74<--, 0.80<--, 0.61<--, 0.60<--            MICROBIOLOGY:     RADIOLOGY:  [ ] Reviewed and interpreted by me    EKG: ----------------------------------------------------------------  Sonam Dubois PGY1  Internal Medicine   [phone]  ----------------------------------------------------------------    Interval Events:   Patient was extremely agitated and aggressive towards staff this morning. Security was called; and he had to be physically restrained by six individuals. He was given 2mg Ativan, Haldol 5 IM, and Benadryl 50 IM and had to be restrained with bilateral arm and leg leather restrained. A few hours later, he calmed down and physical restraints were discontinued. Psych was called for continued evaluation.     OBJECTIVE:  Vital Signs Last 24 Hrs  T(C): 36.4 (27 Dec 2018 05:29), Max: 36.6 (26 Dec 2018 14:58)  T(F): 97.5 (27 Dec 2018 05:29), Max: 97.9 (26 Dec 2018 14:58)  HR: 94 (27 Dec 2018 05:29) (94 - 102)  BP: 126/87 (27 Dec 2018 05:29) (101/70 - 129/83)  BP(mean): --  RR: 18 (27 Dec 2018 05:29) (18 - 18)  SpO2: 99% (27 Dec 2018 05:29) (99% - 100%)      PHYSICAL EXAM:  General:  man in NAD.   HEENT:  PERRL, EOMI	  Cardiovascular: increased heart rate, regular rhythm, Normal S1 S2, No JVD, No murmurs appreciated  Respiratory: Lungs clear to auscultation	  Gastrointestinal:  Soft, Non-tender, + BS	  Skin: No rashes, No ecchymoses, No cyanosis	  Extremities: Normal range of motion, No clubbing, cyanosis or edema  Vascular: Peripheral pulses palpable 2+ bilaterally    LINES:    HOSPITAL MEDICATIONS:  aspirin  chewable 81 milliGRAM(s) Oral daily  clopidogrel Tablet 75 milliGRAM(s) Oral daily  enoxaparin Injectable 40 milliGRAM(s) SubCutaneous every 24 hours  lisinopril 5 milliGRAM(s) Oral daily  atorvastatin 80 milliGRAM(s) Oral at bedtime  dextrose 40% Gel 15 Gram(s) Oral once PRN  dextrose 50% Injectable 12.5 Gram(s) IV Push once  dextrose 50% Injectable 25 Gram(s) IV Push once  dextrose 50% Injectable 25 Gram(s) IV Push once  glucagon  Injectable 1 milliGRAM(s) IntraMuscular once PRN  insulin glargine Injectable (LANTUS) 24 Unit(s) SubCutaneous every morning  insulin lispro (HumaLOG) corrective regimen sliding scale   SubCutaneous three times a day before meals  insulin lispro (HumaLOG) corrective regimen sliding scale   SubCutaneous at bedtime  insulin lispro Injectable (HumaLOG) 10 Unit(s) SubCutaneous three times a day before meals  benztropine 1 milliGRAM(s) Oral two times a day  diVALproex  milliGRAM(s) Oral two times a day  haloperidol     Tablet 20 milliGRAM(s) Oral at bedtime  haloperidol    Injectable 5 milliGRAM(s) IV Push once  LORazepam   Injectable 2 milliGRAM(s) IV Push every 6 hours PRN  dextrose 5%. 1000 milliLiter(s) IV Continuous <Continuous>      LABS:                        15.0   10.18 )-----------( 239      ( 27 Dec 2018 05:34 )             44.7     Hgb Trend: 15.0<--, 13.8<--, 14.4<--, 14.5<--, 15.2<--  12-27    135  |  99  |  10  ----------------------------<  284<H>  4.0   |  20<L>  |  0.73    Ca    9.7      27 Dec 2018 05:34  Phos  4.1     12-27  Mg     1.7     12-27    TPro  6.4  /  Alb  3.6  /  TBili  0.5  /  DBili  x   /  AST  12  /  ALT  10  /  AlkPhos  59  12-26    Creatinine Trend: 0.73<--, 0.69<--, 0.74<--, 0.80<--, 0.61<--, 0.60<--

## 2018-12-27 NOTE — PROVIDER CONTACT NOTE (OTHER) - SITUATION
patient remains lethargic. s/p iv fluids given as ordered. patient however moves around in bed himself. repositioning himself. pupils reactive. patient squeezing eyes shut.

## 2018-12-27 NOTE — PROGRESS NOTE ADULT - PROBLEM SELECTOR PLAN 4
DVT ppx: Lovenox  Diet: Regular/Consistent Carb/DASH/TLC DVT ppx: Lovenox  Diet: Regular/Consistent Carb/DASH/TLC  Dispo: had long discussion with patient's mother and sister this morning (at bedside) after the incident of aggression. They stated that patient behaves like this at home quite frequently, and that it is difficult for them to restrain and keep him calm at home. Patient also refuses to take medications. Pt came in with DKA due to poorly controlled DM; endocrinology has been involved in his care and stated that the patient will need insulin for adequate control. Patient's family stated that they will not be able to give him insulin at home as they had tried 1 year prior, without success. Family was in agreement with placing patient in a  group home, to ensure adequate care.

## 2018-12-28 DIAGNOSIS — F31.60 BIPOLAR DISORDER, CURRENT EPISODE MIXED, UNSPECIFIED: ICD-10-CM

## 2018-12-28 LAB
ALBUMIN SERPL ELPH-MCNC: 3.3 G/DL — SIGNIFICANT CHANGE UP (ref 3.3–5)
ALP SERPL-CCNC: 57 U/L — SIGNIFICANT CHANGE UP (ref 40–120)
ALT FLD-CCNC: 17 U/L — SIGNIFICANT CHANGE UP (ref 4–41)
AST SERPL-CCNC: 24 U/L — SIGNIFICANT CHANGE UP (ref 4–40)
BILIRUB SERPL-MCNC: 0.3 MG/DL — SIGNIFICANT CHANGE UP (ref 0.2–1.2)
BUN SERPL-MCNC: 7 MG/DL — SIGNIFICANT CHANGE UP (ref 7–23)
CALCIUM SERPL-MCNC: 8.7 MG/DL — SIGNIFICANT CHANGE UP (ref 8.4–10.5)
CHLORIDE SERPL-SCNC: 103 MMOL/L — SIGNIFICANT CHANGE UP (ref 98–107)
CO2 SERPL-SCNC: 23 MMOL/L — SIGNIFICANT CHANGE UP (ref 22–31)
CREAT SERPL-MCNC: 0.62 MG/DL — SIGNIFICANT CHANGE UP (ref 0.5–1.3)
GLUCOSE BLDC GLUCOMTR-MCNC: 188 MG/DL — HIGH (ref 70–99)
GLUCOSE BLDC GLUCOMTR-MCNC: 194 MG/DL — HIGH (ref 70–99)
GLUCOSE BLDC GLUCOMTR-MCNC: 212 MG/DL — HIGH (ref 70–99)
GLUCOSE BLDC GLUCOMTR-MCNC: 233 MG/DL — HIGH (ref 70–99)
GLUCOSE BLDC GLUCOMTR-MCNC: 84 MG/DL — SIGNIFICANT CHANGE UP (ref 70–99)
GLUCOSE SERPL-MCNC: 215 MG/DL — HIGH (ref 70–99)
HCT VFR BLD CALC: 38.8 % — LOW (ref 39–50)
HGB BLD-MCNC: 12.7 G/DL — LOW (ref 13–17)
MAGNESIUM SERPL-MCNC: 1.8 MG/DL — SIGNIFICANT CHANGE UP (ref 1.6–2.6)
MCHC RBC-ENTMCNC: 27.8 PG — SIGNIFICANT CHANGE UP (ref 27–34)
MCHC RBC-ENTMCNC: 32.7 % — SIGNIFICANT CHANGE UP (ref 32–36)
MCV RBC AUTO: 84.9 FL — SIGNIFICANT CHANGE UP (ref 80–100)
NRBC # FLD: 0 — SIGNIFICANT CHANGE UP
PHOSPHATE SERPL-MCNC: 3.9 MG/DL — SIGNIFICANT CHANGE UP (ref 2.5–4.5)
PLATELET # BLD AUTO: 213 K/UL — SIGNIFICANT CHANGE UP (ref 150–400)
PMV BLD: 11 FL — SIGNIFICANT CHANGE UP (ref 7–13)
POTASSIUM SERPL-MCNC: 3.7 MMOL/L — SIGNIFICANT CHANGE UP (ref 3.5–5.3)
POTASSIUM SERPL-SCNC: 3.7 MMOL/L — SIGNIFICANT CHANGE UP (ref 3.5–5.3)
PROT SERPL-MCNC: 6 G/DL — SIGNIFICANT CHANGE UP (ref 6–8.3)
RBC # BLD: 4.57 M/UL — SIGNIFICANT CHANGE UP (ref 4.2–5.8)
RBC # FLD: 12.7 % — SIGNIFICANT CHANGE UP (ref 10.3–14.5)
SODIUM SERPL-SCNC: 138 MMOL/L — SIGNIFICANT CHANGE UP (ref 135–145)
VALPROATE SERPL-MCNC: 38 UG/ML — LOW (ref 50–100)
WBC # BLD: 8.4 K/UL — SIGNIFICANT CHANGE UP (ref 3.8–10.5)
WBC # FLD AUTO: 8.4 K/UL — SIGNIFICANT CHANGE UP (ref 3.8–10.5)

## 2018-12-28 PROCEDURE — 99232 SBSQ HOSP IP/OBS MODERATE 35: CPT

## 2018-12-28 PROCEDURE — 99233 SBSQ HOSP IP/OBS HIGH 50: CPT

## 2018-12-28 PROCEDURE — 99233 SBSQ HOSP IP/OBS HIGH 50: CPT | Mod: GC

## 2018-12-28 RX ORDER — OLANZAPINE 15 MG/1
5 TABLET, FILM COATED ORAL EVERY 6 HOURS
Qty: 0 | Refills: 0 | Status: DISCONTINUED | OUTPATIENT
Start: 2018-12-28 | End: 2019-01-08

## 2018-12-28 RX ORDER — OLANZAPINE 15 MG/1
5 TABLET, FILM COATED ORAL EVERY 6 HOURS
Qty: 0 | Refills: 0 | Status: DISCONTINUED | OUTPATIENT
Start: 2018-12-28 | End: 2018-12-28

## 2018-12-28 RX ORDER — DIVALPROEX SODIUM 500 MG/1
500 TABLET, DELAYED RELEASE ORAL
Qty: 0 | Refills: 0 | Status: DISCONTINUED | OUTPATIENT
Start: 2018-12-28 | End: 2018-12-28

## 2018-12-28 RX ORDER — DIVALPROEX SODIUM 500 MG/1
750 TABLET, DELAYED RELEASE ORAL AT BEDTIME
Qty: 0 | Refills: 0 | Status: DISCONTINUED | OUTPATIENT
Start: 2018-12-28 | End: 2019-01-08

## 2018-12-28 RX ORDER — DIVALPROEX SODIUM 500 MG/1
500 TABLET, DELAYED RELEASE ORAL
Qty: 0 | Refills: 0 | Status: DISCONTINUED | OUTPATIENT
Start: 2018-12-28 | End: 2019-01-03

## 2018-12-28 RX ORDER — INSULIN LISPRO 100/ML
VIAL (ML) SUBCUTANEOUS
Qty: 0 | Refills: 0 | Status: DISCONTINUED | OUTPATIENT
Start: 2018-12-28 | End: 2019-01-07

## 2018-12-28 RX ADMIN — Medication 1: at 13:50

## 2018-12-28 RX ADMIN — Medication 10 UNIT(S): at 13:50

## 2018-12-28 RX ADMIN — LISINOPRIL 5 MILLIGRAM(S): 2.5 TABLET ORAL at 05:53

## 2018-12-28 RX ADMIN — Medication 2: at 10:23

## 2018-12-28 RX ADMIN — ENOXAPARIN SODIUM 40 MILLIGRAM(S): 100 INJECTION SUBCUTANEOUS at 13:15

## 2018-12-28 RX ADMIN — DIVALPROEX SODIUM 500 MILLIGRAM(S): 500 TABLET, DELAYED RELEASE ORAL at 05:53

## 2018-12-28 RX ADMIN — DIVALPROEX SODIUM 750 MILLIGRAM(S): 500 TABLET, DELAYED RELEASE ORAL at 22:59

## 2018-12-28 RX ADMIN — Medication 81 MILLIGRAM(S): at 13:16

## 2018-12-28 RX ADMIN — Medication 1 MILLIGRAM(S): at 20:17

## 2018-12-28 RX ADMIN — CLOPIDOGREL BISULFATE 75 MILLIGRAM(S): 75 TABLET, FILM COATED ORAL at 13:16

## 2018-12-28 RX ADMIN — Medication 10 UNIT(S): at 19:00

## 2018-12-28 RX ADMIN — INSULIN GLARGINE 24 UNIT(S): 100 INJECTION, SOLUTION SUBCUTANEOUS at 10:22

## 2018-12-28 RX ADMIN — ATORVASTATIN CALCIUM 80 MILLIGRAM(S): 80 TABLET, FILM COATED ORAL at 22:58

## 2018-12-28 RX ADMIN — Medication 1 MILLIGRAM(S): at 05:53

## 2018-12-28 RX ADMIN — HALOPERIDOL DECANOATE 20 MILLIGRAM(S): 100 INJECTION INTRAMUSCULAR at 22:59

## 2018-12-28 NOTE — PROGRESS NOTE ADULT - PROBLEM SELECTOR PLAN 2
Likely in setting of poorly uncontrolled DM and non-compliance with diet. r/o infectious source. UA negative. CXR clear.   -AG closed  -Maintain K >4  -F/U A1C  -Endo consult  -- c/w Lantus 24 at bedtime; Humalog 10 premeal  --F/U LIANNA and Islet cells; C-peptide

## 2018-12-28 NOTE — PROGRESS NOTE BEHAVIORAL HEALTH - NSBHADMITCOUNSEL_PSY_A_CORE
instructions for management, treatment and follow up/risk factor reduction/importance of adherence to chosen treatment
importance of adherence to chosen treatment/instructions for management, treatment and follow up/risk factor reduction
importance of adherence to chosen treatment/instructions for management, treatment and follow up/risk factor reduction

## 2018-12-28 NOTE — PROGRESS NOTE BEHAVIORAL HEALTH - NSBHCHARTREVIEWLAB_PSY_A_CORE FT
CAPILLARY BLOOD GLUCOSE      POCT Blood Glucose.: 250 mg/dL (24 Dec 2018 13:59)  POCT Blood Glucose.: 249 mg/dL (24 Dec 2018 13:03)  POCT Blood Glucose.: 242 mg/dL (24 Dec 2018 09:02)  POCT Blood Glucose.: 298 mg/dL (24 Dec 2018 06:28)  POCT Blood Glucose.: 343 mg/dL (24 Dec 2018 01:25)  POCT Blood Glucose.: 354 mg/dL (23 Dec 2018 22:48)  POCT Blood Glucose.: 448 mg/dL (23 Dec 2018 21:35)
CBC Full  -  ( 27 Dec 2018 05:34 )  WBC Count : 10.18 K/uL  Hemoglobin : 15.0 g/dL  Hematocrit : 44.7 %  Platelet Count - Automated : 239 K/uL  Mean Cell Volume : 81.6 fL  Mean Cell Hemoglobin : 27.4 pg  Mean Cell Hemoglobin Concentration : 33.6 %  Auto Neutrophil # : x  Auto Lymphocyte # : x  Auto Monocyte # : x  Auto Eosinophil # : x  Auto Basophil # : x  Auto Neutrophil % : x  Auto Lymphocyte % : x  Auto Monocyte % : x  Auto Eosinophil % : x  Auto Basophil % : x  12-27    135  |  99  |  10  ----------------------------<  284<H>  4.0   |  20<L>  |  0.73    Ca    9.7      27 Dec 2018 05:34  Phos  4.1     12-27  Mg     1.7     12-27    TPro  6.4  /  Alb  3.6  /  TBili  0.5  /  DBili  x   /  AST  12  /  ALT  10  /  AlkPhos  59  12-26
CBC Full  -  ( 28 Dec 2018 05:45 )  WBC Count : 8.40 K/uL  Hemoglobin : 12.7 g/dL  Hematocrit : 38.8 %  Platelet Count - Automated : 213 K/uL  Mean Cell Volume : 84.9 fL  Mean Cell Hemoglobin : 27.8 pg  Mean Cell Hemoglobin Concentration : 32.7 %  Auto Neutrophil # : x  Auto Lymphocyte # : x  Auto Monocyte # : x  Auto Eosinophil # : x  Auto Basophil # : x  Auto Neutrophil % : x  Auto Lymphocyte % : x  Auto Monocyte % : x  Auto Eosinophil % : x  Auto Basophil % : x  12-28    138  |  103  |  7   ----------------------------<  215<H>  3.7   |  23  |  0.62    Ca    8.7      28 Dec 2018 06:30  Phos  3.9     12-28  Mg     1.8     12-28    TPro  6.0  /  Alb  3.3  /  TBili  0.3  /  DBili  x   /  AST  24  /  ALT  17  /  AlkPhos  57  12-28

## 2018-12-28 NOTE — PROGRESS NOTE ADULT - PROBLEM SELECTOR PLAN 1
Patient admitted for DKA. patient MUST ALWAYS receive basal insulin to prevent return of DKA.   For now, continue with Lantus to 24 daily, patient gets the does every morning.   Continue with lispro 10 units tid with meals  given the fact pt has needed minimal premeal or correction insulin yesterday afternoon and evening, would decrease to low dose sliding scale, especially given the 76 yesterday with 2 units of lispro on board.     -would advise that pt have a sign in the room that states no snacking after 9pm, and that primary team be notified if pt refuses Lantus/insulin as he is otherwise at risk for DKA.    Would also clarify type 1 vs. 2 DM. followup LIANNA and islet cell antibodies (collected and pending)  check C-peptide when glucose is less than <200 in the am fasting   Patient will need insulin at [place]   He will need outpatient endocrine follow up [phone].

## 2018-12-28 NOTE — PROGRESS NOTE ADULT - ASSESSMENT
28 year old man with schizophrenia, DM unsure of type, uncontrolled, admitted with DKA. HbA1c 13.8%, also found to have NSTEMI, medically managed.  pt and family unable to give insulin at home, dc planning ongoing with plan for group home.

## 2018-12-28 NOTE — PROGRESS NOTE ADULT - PROBLEM SELECTOR PLAN 1
Type II NSTEMI in the setting of DKA; Cardiac enzymes were elevated with HS trop 913, , 35.27. EKG w/ NSR and no LISETTE  -S/P Aspirin an Brilinta load   -c/w Asp  -C/w Plavix 75mg daily  -c/w lisinopril 5mg daily   -c/w heparin drip  -trend trops  -c/w Atorvastatin  -F/U TTE  -Cardiology consulted, recs appreciated;  ----no plan for cath at this time

## 2018-12-28 NOTE — PROGRESS NOTE ADULT - PROBLEM SELECTOR PLAN 3
-c/w home meds  -F/U psych recs  -----Haldol 5mg PRN  -----Zyprexa 5mg PRN -c/w home meds  ---Haldol 20mg qhs; Depakote 500 in the AM and 750 at night  -F/U psych recs  -----Haldol 5mg PRN  -----Zyprexa 5mg PRN

## 2018-12-28 NOTE — PROGRESS NOTE BEHAVIORAL HEALTH - NSBHFUPINTERVALHXFT_PSY_A_CORE
Met with the patient this morning. Contrary to most days, he wakes up when name is called, answers a few questions. Child like, and whines when asked for a check of his UE- no cogwheeling or rigidity. Calm, wants to go back to sleep. Does not make any si or hi statements, does not appear to be paranoid or responding to internal stimuli.   Care coordinated closely with team- [id number]

## 2018-12-28 NOTE — PROGRESS NOTE ADULT - PROBLEM SELECTOR PLAN 4
DVT ppx: Lovenox  Diet: Regular/Consistent Carb/DASH/TLC  Dispo: had long discussion with patient's mother and sister  on 12/27 (at bedside) after the incident of aggression. They stated that patient behaves like this at home quite frequently, and that it is difficult for them to restrain and keep him calm at home. Patient also refuses to take medications. Pt came in with DKA due to poorly controlled DM; endocrinology has been involved in his care and stated that the patient will need insulin for adequate control. Patient's family stated that they will not be able to give him insulin at home as they had tried 1 year prior, without success. Family was in agreement with placing patient in a  group home, to ensure adequate care.

## 2018-12-28 NOTE — PROGRESS NOTE BEHAVIORAL HEALTH - SUMMARY
Patient is a 29 yo single never  man of [place] descent with no dependents, domiciled at home with parents who are his caregivers, unemployed, diagnosed with bipolar disorder, Intermittent Explosive Disorder, intellectual disability (IQ=40), multiple inpatient psychiatric admissions last to [facility] in Sept 29 2015 to Oct 7 2015, currently in [facility] with NP Nury Pabon; he has a long history of aggression toward family and chronic poor frustration tolerance; no prior suicide attempts, no legal problems, no substance use, no abuse history, has PMH of DM2, who has been admitted for chest pain. Psychiatry was consulted to assess his agitation.    12/27: Agitated, aggressive this morning, likely in the context of his poor frustration tolerance to finger sticks and insulin injections.     12/28: Intermittent agitation, poor frustration tolerance, and more agitation in context insulin administration and finger sticks

## 2018-12-28 NOTE — PROGRESS NOTE ADULT - SUBJECTIVE AND OBJECTIVE BOX
----------------------------------------------------------------  Abdoulmelissa Dubois PGY1  Internal Medicine   [phone]  ----------------------------------------------------------------    Interval Events: No acute event overnight. Patient is seen and examined at the bedside this morning.       OBJECTIVE:  Vital Signs Last 24 Hrs  T(C): 36.4 (28 Dec 2018 05:36), Max: 37.3 (27 Dec 2018 10:49)  T(F): 97.6 (28 Dec 2018 05:36), Max: 99.1 (27 Dec 2018 10:49)  HR: 72 (28 Dec 2018 05:36) (66 - 120)  BP: 112/76 (28 Dec 2018 05:36) (93/53 - 154/51)  BP(mean): --  RR: 18 (28 Dec 2018 05:36) (16 - 18)  SpO2: 85% (28 Dec 2018 05:36) (85% - 100%)    CAPILLARY BLOOD GLUCOSE      POCT Blood Glucose.: 151 mg/dL (27 Dec 2018 22:37)      PHYSICAL EXAM:  General:  man in NAD.   HEENT:  PERRL, EOMI	  Cardiovascular: increased heart rate, regular rhythm, Normal S1 S2, No JVD, No murmurs appreciated  Respiratory: Lungs clear to auscultation	  Gastrointestinal:  Soft, Non-tender, + BS	  Skin: No rashes, No ecchymoses, No cyanosis	  Extremities: Normal range of motion, No clubbing, cyanosis or edema  Vascular: Peripheral pulses palpable 2+ bilaterally        HOSPITAL MEDICATIONS:  aspirin  chewable 81 milliGRAM(s) Oral daily  clopidogrel Tablet 75 milliGRAM(s) Oral daily  enoxaparin Injectable 40 milliGRAM(s) SubCutaneous every 24 hours  lisinopril 5 milliGRAM(s) Oral daily  atorvastatin 80 milliGRAM(s) Oral at bedtime  dextrose 40% Gel 15 Gram(s) Oral once PRN  dextrose 50% Injectable 12.5 Gram(s) IV Push once  dextrose 50% Injectable 25 Gram(s) IV Push once  dextrose 50% Injectable 25 Gram(s) IV Push once  glucagon  Injectable 1 milliGRAM(s) IntraMuscular once PRN  insulin glargine Injectable (LANTUS) 24 Unit(s) SubCutaneous every morning  insulin lispro (HumaLOG) corrective regimen sliding scale   SubCutaneous three times a day before meals  insulin lispro (HumaLOG) corrective regimen sliding scale   SubCutaneous at bedtime  insulin lispro Injectable (HumaLOG) 10 Unit(s) SubCutaneous three times a day before meals  benztropine 1 milliGRAM(s) Oral two times a day  diVALproex  milliGRAM(s) Oral two times a day  haloperidol     Tablet 20 milliGRAM(s) Oral at bedtime  LORazepam   Injectable 2 milliGRAM(s) IV Push every 6 hours PRN  OLANZapine Injectable 5 milliGRAM(s) IntraMuscular every 6 hours PRN  dextrose 5%. 1000 milliLiter(s) IV Continuous <Continuous>      LABS:                        12.7   8.40  )-----------( 213      ( 28 Dec 2018 05:45 )             38.8     Hgb Trend: 12.7<--, 15.0<--, 13.8<--, 14.4<--, 14.5<--  12-28    138  |  103  |  7   ----------------------------<  215<H>  3.7   |  23  |  0.62    Ca    8.7      28 Dec 2018 06:30  Phos  3.9     12-28  Mg     1.8     12-28    TPro  6.0  /  Alb  3.3  /  TBili  0.3  /  DBili  x   /  AST  24  /  ALT  17  /  AlkPhos  57  12-28    Creatinine Trend: 0.62<--, 0.73<--, 0.69<--, 0.74<--, 0.80<--, 0.61<--

## 2018-12-29 ENCOUNTER — TRANSCRIPTION ENCOUNTER (OUTPATIENT)
Age: 28
End: 2018-12-29

## 2018-12-29 LAB
GAD65 AB SER-MCNC: 0 NMOL/L — SIGNIFICANT CHANGE UP
GLUCOSE BLDC GLUCOMTR-MCNC: 115 MG/DL — HIGH (ref 70–99)
GLUCOSE BLDC GLUCOMTR-MCNC: 170 MG/DL — HIGH (ref 70–99)
GLUCOSE BLDC GLUCOMTR-MCNC: 200 MG/DL — HIGH (ref 70–99)
GLUCOSE BLDC GLUCOMTR-MCNC: 231 MG/DL — HIGH (ref 70–99)
ISLET CELL512 AB SER-ACNC: SIGNIFICANT CHANGE UP

## 2018-12-29 PROCEDURE — 99232 SBSQ HOSP IP/OBS MODERATE 35: CPT | Mod: GC

## 2018-12-29 RX ORDER — HALOPERIDOL DECANOATE 100 MG/ML
1 INJECTION INTRAMUSCULAR ONCE
Qty: 0 | Refills: 0 | Status: COMPLETED | OUTPATIENT
Start: 2018-12-29 | End: 2018-12-29

## 2018-12-29 RX ADMIN — Medication 1 MILLIGRAM(S): at 08:51

## 2018-12-29 RX ADMIN — ATORVASTATIN CALCIUM 80 MILLIGRAM(S): 80 TABLET, FILM COATED ORAL at 23:30

## 2018-12-29 RX ADMIN — Medication 2 MILLIGRAM(S): at 23:01

## 2018-12-29 RX ADMIN — CLOPIDOGREL BISULFATE 75 MILLIGRAM(S): 75 TABLET, FILM COATED ORAL at 13:41

## 2018-12-29 RX ADMIN — Medication 10 UNIT(S): at 13:42

## 2018-12-29 RX ADMIN — OLANZAPINE 5 MILLIGRAM(S): 15 TABLET, FILM COATED ORAL at 03:27

## 2018-12-29 RX ADMIN — DIVALPROEX SODIUM 750 MILLIGRAM(S): 500 TABLET, DELAYED RELEASE ORAL at 23:30

## 2018-12-29 RX ADMIN — Medication 81 MILLIGRAM(S): at 13:41

## 2018-12-29 RX ADMIN — Medication 2: at 13:42

## 2018-12-29 RX ADMIN — Medication 10 UNIT(S): at 09:14

## 2018-12-29 RX ADMIN — ENOXAPARIN SODIUM 40 MILLIGRAM(S): 100 INJECTION SUBCUTANEOUS at 13:41

## 2018-12-29 RX ADMIN — Medication 1 MILLIGRAM(S): at 23:30

## 2018-12-29 RX ADMIN — DIVALPROEX SODIUM 500 MILLIGRAM(S): 500 TABLET, DELAYED RELEASE ORAL at 08:51

## 2018-12-29 RX ADMIN — Medication 10 UNIT(S): at 18:41

## 2018-12-29 RX ADMIN — Medication 1: at 09:07

## 2018-12-29 RX ADMIN — HALOPERIDOL DECANOATE 20 MILLIGRAM(S): 100 INJECTION INTRAMUSCULAR at 23:28

## 2018-12-29 RX ADMIN — LISINOPRIL 5 MILLIGRAM(S): 2.5 TABLET ORAL at 07:02

## 2018-12-29 RX ADMIN — INSULIN GLARGINE 24 UNIT(S): 100 INJECTION, SOLUTION SUBCUTANEOUS at 09:07

## 2018-12-29 RX ADMIN — Medication 2 MILLIGRAM(S): at 03:00

## 2018-12-29 NOTE — DISCHARGE NOTE ADULT - ADDITIONAL INSTRUCTIONS
Please follow up with your endocrinologists within 1 to 2 weeks of being discharged. You may call [facility] at [phone] for appointment.  Follow up with your Primary Care Physician within 1-2 weeks. Call for appointment.

## 2018-12-29 NOTE — DISCHARGE NOTE ADULT - PROVIDER TOKENS
FREE:[LAST:[[facility]],PHONE:[phone]],FAX:[(   )    -]] FREE:[LAST:[[facility]],PHONE:[phone]],FAX:[(   )    -]],TOKEN:'[email or web address]'

## 2018-12-29 NOTE — DISCHARGE NOTE ADULT - CARE PROVIDERS DIRECT ADDRESSES
,DirectAddress_Unknown ,DirectAddress_Unknown,[email or web address]

## 2018-12-29 NOTE — DISCHARGE NOTE ADULT - SECONDARY DIAGNOSIS.
ACS (acute coronary syndrome) Bipolar disorder, unspecified Intermittent explosive disorder in adult Intellectual disability NSTEMI (non-ST elevated myocardial infarction)

## 2018-12-29 NOTE — PROGRESS NOTE ADULT - PROBLEM SELECTOR PLAN 2
Likely in setting of poorly uncontrolled DM and non-compliance with diet. r/o infectious source. UA negative. CXR clear.   -AG closed  -Maintain K >4  -F/U A1C  -Endo consult  -- c/w Lantus 24 at bedtime; Humalog 10 premeal  --F/U LIANNA and Islet cells; C-peptide Likely in setting of poorly uncontrolled DM and non-compliance with diet. r/o infectious source. UA negative. CXR clear.   -AG closed  -Maintain K >4  -Endo consult  -- c/w Lantus 24 at bedtime; Humalog 10 premeal  --F/U LIANNA (neg) and Islet cells; C-peptide

## 2018-12-29 NOTE — PROGRESS NOTE ADULT - SUBJECTIVE AND OBJECTIVE BOX
----------------------------------------------------------------  Sonam Dubois PGY1  Internal Medicine   [phone]  ----------------------------------------------------------------    Interval Events: Patient was agitated overnight to the point where security had to be called. Patient was given Zyprexa. He eventually became calm and remained as such throughout the rest of the night.  ROS limited by pt's unwillingness to cooperate       OBJECTIVE:  Vital Signs Last 24 Hrs  T(C): 36.8 (29 Dec 2018 07:00), Max: 36.8 (29 Dec 2018 07:00)  T(F): 98.2 (29 Dec 2018 07:00), Max: 98.2 (29 Dec 2018 07:00)  HR: 94 (29 Dec 2018 07:00) (94 - 96)  BP: 100/61 (29 Dec 2018 07:00) (100/61 - 123/91)  BP(mean): --  RR: 16 (29 Dec 2018 07:00) (16 - 18)  SpO2: 96% (29 Dec 2018 07:00) (96% - 100%)    12-28 @ 07:01  -  12-29 @ 07:00  --------------------------------------------------------  IN: 200 mL / OUT: 0 mL / NET: 200 mL    CAPILLARY BLOOD GLUCOSE  POCT Blood Glucose.: 233 mg/dL (28 Dec 2018 23:00)      PHYSICAL EXAM:  General:  man in NAD.   HEENT:  PERRL, EOMI	  Cardiovascular: increased heart rate, regular rhythm, Normal S1 S2, No JVD, No murmurs appreciated  Respiratory: Lungs clear to auscultation	  Gastrointestinal:  Soft, Non-tender, + BS	  Skin: No rashes, No ecchymoses, No cyanosis	  Extremities: Normal range of motion, No clubbing, cyanosis or edema  Vascular: Peripheral pulses palpable 2+ bilaterally      HOSPITAL MEDICATIONS:  aspirin  chewable 81 milliGRAM(s) Oral daily  clopidogrel Tablet 75 milliGRAM(s) Oral daily  enoxaparin Injectable 40 milliGRAM(s) SubCutaneous every 24 hours  lisinopril 5 milliGRAM(s) Oral daily  atorvastatin 80 milliGRAM(s) Oral at bedtime  dextrose 40% Gel 15 Gram(s) Oral once PRN  dextrose 50% Injectable 12.5 Gram(s) IV Push once  dextrose 50% Injectable 25 Gram(s) IV Push once  dextrose 50% Injectable 25 Gram(s) IV Push once  glucagon  Injectable 1 milliGRAM(s) IntraMuscular once PRN  insulin glargine Injectable (LANTUS) 24 Unit(s) SubCutaneous every morning  insulin lispro (HumaLOG) corrective regimen sliding scale   SubCutaneous at bedtime  insulin lispro (HumaLOG) corrective regimen sliding scale   SubCutaneous three times a day before meals  insulin lispro Injectable (HumaLOG) 10 Unit(s) SubCutaneous three times a day before meals  benztropine 1 milliGRAM(s) Oral two times a day  diVALproex  milliGRAM(s) Oral at bedtime  diVALproex  milliGRAM(s) Oral <User Schedule>  haloperidol     Tablet 20 milliGRAM(s) Oral at bedtime  LORazepam   Injectable 2 milliGRAM(s) IV Push every 6 hours PRN  OLANZapine Injectable 5 milliGRAM(s) IntraMuscular every 6 hours PRN  dextrose 5%. 1000 milliLiter(s) IV Continuous <Continuous>        LABS:                        12.7   8.40  )-----------( 213      ( 28 Dec 2018 05:45 )             38.8     Hgb Trend: 12.7<--, 15.0<--, 13.8<--, 14.4<--, 14.5<--  12-28    138  |  103  |  7   ----------------------------<  215<H>  3.7   |  23  |  0.62    Ca    8.7      28 Dec 2018 06:30  Phos  3.9     12-28  Mg     1.8     12-28    TPro  6.0  /  Alb  3.3  /  TBili  0.3  /  DBili  x   /  AST  24  /  ALT  17  /  AlkPhos  57  12-28    Creatinine Trend: 0.62<--, 0.73<--, 0.69<--, 0.74<--, 0.80<--, 0.61<-- ----------------------------------------------------------------  Sonam Dubois PGY1  Internal Medicine   [phone]  ----------------------------------------------------------------    Interval Events: Patient was agitated overnight to the point where security had to be called. Patient was given Zyprexa. He eventually became calm and remained as such throughout the rest of the night.    ROS limited by pt's unwillingness to cooperate     OBJECTIVE:  Vital Signs Last 24 Hrs  T(C): 36.8 (29 Dec 2018 07:00), Max: 36.8 (29 Dec 2018 07:00)  T(F): 98.2 (29 Dec 2018 07:00), Max: 98.2 (29 Dec 2018 07:00)  HR: 94 (29 Dec 2018 07:00) (94 - 96)  BP: 100/61 (29 Dec 2018 07:00) (100/61 - 123/91)  RR: 16 (29 Dec 2018 07:00) (16 - 18)  SpO2: 96% (29 Dec 2018 07:00) (96% - 100%)    12-28 @ 07:01  -  12-29 @ 07:00  --------------------------------------------------------  IN: 200 mL / OUT: 0 mL / NET: 200 mL    CAPILLARY BLOOD GLUCOSE  POCT Blood Glucose.: 233 mg/dL (28 Dec 2018 23:00)    PHYSICAL EXAM:  General:  man in NAD.   HEENT:  PERRL, EOMI	  Cardiovascular: increased heart rate, regular rhythm, Normal S1 S2, No JVD, No murmurs appreciated  Respiratory: Lungs clear to auscultation	  Gastrointestinal:  Soft, Non-tender, + BS	  Skin: No rashes, No ecchymoses, No cyanosis	  Extremities: Normal range of motion, No clubbing, cyanosis or edema  Vascular: Peripheral pulses palpable 2+ bilaterally      HOSPITAL MEDICATIONS:  aspirin  chewable 81 milliGRAM(s) Oral daily  clopidogrel Tablet 75 milliGRAM(s) Oral daily  enoxaparin Injectable 40 milliGRAM(s) SubCutaneous every 24 hours  lisinopril 5 milliGRAM(s) Oral daily  atorvastatin 80 milliGRAM(s) Oral at bedtime  dextrose 40% Gel 15 Gram(s) Oral once PRN  dextrose 50% Injectable 12.5 Gram(s) IV Push once  dextrose 50% Injectable 25 Gram(s) IV Push once  dextrose 50% Injectable 25 Gram(s) IV Push once  glucagon  Injectable 1 milliGRAM(s) IntraMuscular once PRN  insulin glargine Injectable (LANTUS) 24 Unit(s) SubCutaneous every morning  insulin lispro (HumaLOG) corrective regimen sliding scale   SubCutaneous at bedtime  insulin lispro (HumaLOG) corrective regimen sliding scale   SubCutaneous three times a day before meals  insulin lispro Injectable (HumaLOG) 10 Unit(s) SubCutaneous three times a day before meals  benztropine 1 milliGRAM(s) Oral two times a day  diVALproex  milliGRAM(s) Oral at bedtime  diVALproex  milliGRAM(s) Oral <User Schedule>  haloperidol     Tablet 20 milliGRAM(s) Oral at bedtime  LORazepam   Injectable 2 milliGRAM(s) IV Push every 6 hours PRN  OLANZapine Injectable 5 milliGRAM(s) IntraMuscular every 6 hours PRN  dextrose 5%. 1000 milliLiter(s) IV Continuous <Continuous>      LABS:  refused am labs               Creatinine Trend: 0.62<--, 0.73<--, 0.69<--, 0.74<--, 0.80<--, 0.61<--

## 2018-12-29 NOTE — DISCHARGE NOTE ADULT - PATIENT PORTAL LINK FT
You can access the [facility] Patient Portal, offered by [facility], by registering with the following website: [email or web address]

## 2018-12-29 NOTE — CHART NOTE - NSCHARTNOTEFT_GEN_A_CORE
Patient continuously refusing meds. Locked himself in his bathroom. Security had to help the nurse give him medications. Gave 1mg IV push haldol x1.

## 2018-12-29 NOTE — DISCHARGE NOTE ADULT - HOSPITAL COURSE
H&P as per admitting physician   28yoM w/ PMHx uncontrolled T2DM on oral metformin, intellectual disability, bipolar d/o, schizophrenia who presented to the ED on 12/22 with complaints of severe midsternal chest pain a/w NBNB emesis.  Chest pain described as severe non radiating band like sternal pain which started around 3pm on 12/22. Denied having chest pain before. Patient was unable to state how long the pain lasted, however was not experiencing the pain at the time of this encounter.  Denied any associated shortness of breath, palpitations, lightheadedness, dizziness. Patient/sister also had concerns re his blood sugar, and there was concern for DKA while in the ED. Patient endorsed noncompliance to his diabetic medications (for an unknown time period). Patients sister reported that he previously on insulin over a year ago, but it was discontinued due to fear/agitation with needless.    ED Course  Insulin was max 400s in the ED and was started on insulin drip; MICU was consulted. Cardiac enzymes were elevated with HS trop 913, , 35.27. EKG w/ NSR and no LISETTE, cardiology was consulted.     Hospital Course  Patient was seen by cardiology for his elevated enzymes and chest pain and was found to have a type II NSTEMI. He was started on a heparin drip and  loaded with aspirin and Brilinta, and was initially started on beta blocker. As per cardiology recommendations his beta blocker was discontinued and he ws started on lisinopril 5mg. Brilinta was switched to Plavix. Cardiology evaluation determined that cath was not indicated. In regards to his DKA, he was initially on an insulin drip and was transitioned to subcutaneous insulin. His glucose was controlled on Lantus 24 in the mornings, and 10 units of Humalog premeal.  Psychiatry was consulted for the patients H&P as per admitting physician   28yoM w/ PMHx uncontrolled T2DM on oral metformin, intellectual disability, bipolar d/o, schizophrenia who presented to the ED on 12/22 with complaints of severe midsternal chest pain a/w NBNB emesis.  Chest pain described as severe non radiating band like sternal pain which started around 3pm on 12/22. Denied having chest pain before. Patient was unable to state how long the pain lasted, however was not experiencing the pain at the time of this encounter.  Denied any associated shortness of breath, palpitations, lightheadedness, dizziness. Patient/sister also had concerns re his blood sugar, and there was concern for DKA while in the ED. Patient endorsed noncompliance to his diabetic medications (for an unknown time period). Patients sister reported that he previously on insulin over a year ago, but it was discontinued due to fear/agitation with needless.    ED Course  Insulin was max 400s in the ED and was started on insulin drip; MICU was consulted. Cardiac enzymes were elevated with HS trop 913, , 35.27. EKG w/ NSR and no LISETTE, cardiology was consulted.     Hospital Course  Patient was seen by cardiology for his elevated enzymes and chest pain and was found to have a type II NSTEMI. He was started on a heparin drip and  loaded with aspirin and Brilinta, and was initially started on beta blocker. As per cardiology recommendations his beta blocker was discontinued and he ws started on lisinopril 5mg. Brilinta was switched to Plavix. Cardiology evaluation determined that cath was not indicated. In regards to his DKA, he was initially on an insulin drip and was transitioned to subcutaneous insulin. His glucose was controlled on Lantus 24 in the mornings, and 10 units of Humalog premeal.  Psychiatry was consulted for the patients agitation/aggression. He had multiple episodes of aggression/ agitation. He was continued on his home dose of Haldol 20 at night; along with PRN Zyprexa, Ativan and Haldol. At the time of discharge, patient's condition had improved, he was afebrile and hemodynamically stable. H&P as per admitting physician   28yoM w/ PMHx uncontrolled T2DM on oral metformin, intellectual disability, bipolar d/o, schizophrenia who presented to the ED on 12/22 with complaints of severe midsternal chest pain a/w NBNB emesis.  Chest pain described as severe non radiating band like sternal pain which started around 3pm on 12/22. Denied having chest pain before. Patient was unable to state how long the pain lasted, however was not experiencing the pain at the time of this encounter.  Denied any associated shortness of breath, palpitations, lightheadedness, dizziness. Patient/sister also had concerns re his blood sugar, and there was concern for DKA while in the ED. Patient endorsed noncompliance to his diabetic medications (for an unknown time period). Patients sister reported that he previously on insulin over a year ago, but it was discontinued due to fear/agitation with needless.    ED Course  Insulin was max 400s in the ED and was started on insulin drip; MICU was consulted. Cardiac enzymes were elevated with HS trop 913, , 35.27. EKG w/ NSR and no LISETTE, cardiology was consulted.     Hospital Course  Patient was seen by cardiology for his elevated enzymes and chest pain and was found to have a type II NSTEMI. He was started on a heparin drip and  loaded with aspirin and Brilinta, and was initially started on beta blocker. As per cardiology recommendations his beta blocker was discontinued and he ws started on lisinopril 5mg. Brilinta was switched to Plavix. Cardiology evaluation determined that cath was not indicated. In regards to his DKA, he was initially on an insulin drip and was transitioned to subcutaneous insulin. His glucose was controlled on Lantus 24 in the mornings, and 10 units of Humalog premeal.  Psychiatry was consulted for the patients agitation/aggression. He had multiple episodes of aggression/ agitation. He was continued on his home dose of Haldol 20 at night; along with PRN Zyprexa, Ativan and Haldol. At the time of discharge, patient's condition had improved, he was afebrile and hemodynamically stable. Pt continued to have episodes of agitation during the hospital stay, during which he tried to leave and bite staff.  Pt was treated w/ haldol and benadryl for agitation,and had to be placed on restraints on multiple occasions. He was seen by psych, pt was placed on Haldol 20mg qhs; as per psych, depakote 750mg PO BID, Haldol 5mg PRN Zyprexa 5mg PRN Ativan 2mg PRN. Pt seen by endo, who most recently recommended lantus 26 daily as well increasing humalog to 14/12/12 breakfast/lunch/dinner. Pt is pending placement into group care facility and is not a ele candidate as per psych due to difficulty  insulin compliance. Pt is high risk of DKA and Lantus should not be held; provider should be notified if pt is refusing insulin. H&P as per admitting physician   28yoM w/ PMHx uncontrolled T2DM on oral metformin, intellectual disability, previously dx with bipolar d/o, who presented to the ED on 12/22 with complaints of severe midsternal chest pain associated with NBNB emesis. Chest pain described as severe non radiating band like sternal pain which started around 3pm on 12/22. Denied having chest pain before. Patient was unable to state how long the pain lasted, however was not experiencing the pain at the time of this encounter.  Denied any associated shortness of breath, palpitations, lightheadedness, dizziness. Patient/sister also had concerns re his blood sugar, and there was concern for DKA while in the ED. Patient endorsed noncompliance to his diabetic medications (for an unknown time period). Patients sister reported that he previously on insulin over a year ago, but it was discontinued due to fear/agitation with needless.    ED Course  Glucose was max 400s, labs notable for DKA (+B-hydroxybutyrate, +ketones, AG 20, ph 7.26) and was started on insulin drip; MICU was consulted. Cardiac enzymes were elevated with HS trop 913, , 35.27. EKG w/ NSR and no LISETTE, cardiology was consulted.     Hospital Course  Patient was seen by cardiology for his elevated enzymes and chest pain and was found to have a type II NSTEMI. He was started on a heparin drip and  loaded with aspirin and Brilinta, and was initially started on beta blocker. As per cardiology recommendations his beta blocker was discontinued and he ws started on lisinopril 5mg. Brilinta was switched to Plavix. Cardiology evaluation determined that cath was not indicated.    In regards to his DKA, he was initially on an insulin drip and was transitioned to subcutaneous insulin. His glucose was initially controlled on Lantus 24 in the mornings, and 10 units of Humalog premeal.  Pt seen by endo, who most recently recommended lantus 26 daily as well increasing humalog to 14/12/12 breakfast/lunch/dinner. Pt is high risk of DKA and Lantus should not be held; provider should be notified if pt is refusing insulin..        Psychiatry was consulted for the patients agitation/aggression. He had multiple episodes of aggression/agitation during the hospital stay, during which he tried to leave and bite staff.  Pt was treated w/ haldol and benadryl for agitation, and had to be placed on restraints on multiple occasions. He was seen by psych, pt was placed on Haldol 20mg qhs; as per psych, depakote 750mg PO BID, Haldol 5mg PRN Zyprexa 5mg PRN Ativan 2mg PRN.  Pt is pending placement into [facility] facility and is not a ele candidate as per psych due to difficulty  insulin compliance.      .At the time of discharge, patient's condition had improved, he was afebrile and hemodynamically stable. H&P as per admitting physician   28yoM w/ PMHx uncontrolled T2DM on oral metformin, intellectual disability, previously dx with bipolar d/o, who presented to the ED on 12/22 with complaints of severe midsternal chest pain associated with NBNB emesis. Chest pain described as severe non radiating band like sternal pain which started around 3pm on 12/22. Denied having chest pain before. Patient was unable to state how long the pain lasted, however was not experiencing the pain at the time of this encounter.  Denied any associated shortness of breath, palpitations, lightheadedness, dizziness. Patient/sister also had concerns re his blood sugar, and there was concern for DKA while in the ED. Patient endorsed noncompliance to his diabetic medications (for an unknown time period). Patients sister reported that he previously on insulin over a year ago, but it was discontinued due to fear/agitation with needless.    ED Course  Glucose was max 400s, labs notable for DKA (+B-hydroxybutyrate, +ketones, AG 20, ph 7.26) and was started on insulin drip; MICU was consulted. Cardiac enzymes were elevated with HS trop 913, , 35.27. EKG w/ NSR and no LISETTE, cardiology was consulted.     Hospital Course  Patient was seen by cardiology for his elevated enzymes and chest pain and was found to have a type II NSTEMI. He was started on a heparin drip and  loaded with aspirin and Brilinta, and was initially started on beta blocker. As per cardiology recommendations his beta blocker was discontinued and he was started on lisinopril 5mg. Brilinta was switched to Plavix. Cardiology evaluation determined that cath was not indicated.    In regards to his DKA, he was initially on an insulin drip and was transitioned to subcutaneous insulin. His glucose was initially controlled on Lantus 26 units in the mornings, and 14 units of Humalog premeal.  Pt seen by endocrinology. Pt is high risk of DKA and Lantus should not be held.    Psychiatry was consulted for the patients agitation/aggression. He had multiple episodes of aggression/agitation during the hospital stay, during which he tried to leave and bite staff.  Pt was treated w/ haldol and benadryl for agitation, and had to be placed on restraints on multiple occasions. He was seen by psych, pt was placed on Haldol 20mg qhs; as per psych, depakote 750mg PO BID, Haldol 5mg PRN, Zyprexa 5mg PRN, Ativan 2mg PRN.  Pt is not a ele candidate as per psych due to difficulty insulin compliance.    At the time of discharge, patient's condition had improved, he was afebrile and hemodynamically stable. H&P as per admitting physician   28yoM w/ PMHx uncontrolled T2DM on oral metformin, intellectual disability, previously dx with bipolar d/o, who presented to the ED on 12/22 with complaints of severe midsternal chest pain associated with NBNB emesis. Chest pain described as severe non radiating band like sternal pain which started around 3pm on 12/22. Denied having chest pain before. Patient was unable to state how long the pain lasted, however was not experiencing the pain at the time of this encounter.  Denied any associated shortness of breath, palpitations, lightheadedness, dizziness. Patient/sister also had concerns re his blood sugar, and there was concern for DKA while in the ED. Patient endorsed noncompliance to his diabetic medications (for an unknown time period). Patients sister reported that he previously on insulin over a year ago, but it was discontinued due to fear/agitation with needless.    ED Course  Glucose was max 400s, labs notable for DKA (+B-hydroxybutyrate, +ketones, AG 20, ph 7.26) and was started on insulin drip; MICU was consulted. Cardiac enzymes were elevated with HS trop 913, , 35.27. EKG w/ NSR and no LISETTE, cardiology was consulted.     Hospital Course  Patient was seen by cardiology for his elevated enzymes and chest pain and was found to have a type II NSTEMI. He was started on a heparin drip and  loaded with aspirin and Brilinta, and was initially started on beta blocker. As per cardiology recommendations his beta blocker was discontinued and he was started on lisinopril 5mg. Brilinta was switched to Plavix. Cardiology evaluation determined that cath was not indicated.    In regards to his DKA, he was initially on an insulin drip and was transitioned to subcutaneous insulin. His glucose was initially controlled on Lantus 29 units in the mornings, and 15 units of Humalog premeal.  Pt seen by endocrinology. Pt is high risk of DKA and Lantus should not be held.    Psychiatry was consulted for the patients agitation/aggression. He had multiple episodes of aggression/agitation during the hospital stay, during which he tried to leave and bite staff.  Pt was treated w/ haldol and benadryl for agitation, and had to be placed on restraints on multiple occasions. He was seen by psych, pt was placed on Haldol 20mg qhs; as per psych, depakote 750mg PO BID, Haldol 5mg PRN, Zyprexa 5mg PRN, Ativan 2mg PRN.  Pt is not a ele candidate as per psych due to difficulty insulin compliance.    At the time of discharge, patient's condition had improved, he was afebrile and hemodynamically stable. H&P as per admitting physician   28yoM w/ PMHx uncontrolled T2DM on oral metformin, intellectual disability, previously dx with bipolar d/o, who presented to the ED on 12/22 with complaints of severe midsternal chest pain associated with NBNB emesis. Chest pain described as severe non radiating band like sternal pain which started around 3pm on 12/22. Denied having chest pain before. Patient was unable to state how long the pain lasted, however was not experiencing the pain at the time of this encounter.  Denied any associated shortness of breath, palpitations, lightheadedness, dizziness. Patient/sister also had concerns re his blood sugar, and there was concern for DKA while in the ED. Patient endorsed noncompliance to his diabetic medications (for an unknown time period). Patients sister reported that he previously on insulin over a year ago, but it was discontinued due to fear/agitation with needless.    ED Course  Glucose was max 400s, labs notable for DKA (+B-hydroxybutyrate, +ketones, AG 20, ph 7.26) and was started on insulin drip; MICU was consulted. Cardiac enzymes were elevated with HS trop 913, , 35.27. EKG w/ NSR and no LISETTE, cardiology was consulted.     Hospital Course  Patient was seen by cardiology for his elevated enzymes and chest pain and was found to have a type II NSTEMI. He was started on a heparin drip and  loaded with aspirin and Brilinta, and was initially started on beta blocker. As per cardiology recommendations his beta blocker was discontinued and he was started on lisinopril 5mg. Brilinta was switched to Plavix. Cardiology evaluation determined that cath was not indicated.    In regards to his DKA, he was initially on an insulin drip and was transitioned to subcutaneous insulin. His glucose was initially controlled on Lantus 29 units in the mornings, and 15 units of Humalog premeal.  Pt seen by endocrinology. Pt is high risk of DKA and Lantus should not be held.    Psychiatry was consulted for the patients agitation/aggression. He had multiple episodes of aggression/agitation during the hospital stay, during which he tried to leave and bite staff.  Pt was treated w/ haldol and benadryl for agitation, and had to be placed on restraints on multiple occasions. He was seen by psych, pt was placed on Haldol 20mg qhs; as per psych, depakote 750mg PO BID, Haldol 5mg PRN, Zyprexa 5mg PRN, Ativan 2mg PRN.  Pt is not a ele candidate as per psych due to difficulty insulin compliance.    At the time of discharge, patient's condition had improved, he was afebrile and hemodynamically stable.  insulin teaching was performed with patient's family member - both pens and vial H&P as per admitting physician   28yoM w/ PMHx uncontrolled T2DM on oral metformin, intellectual disability, previously dx with bipolar d/o, who presented to the ED on 12/22 with complaints of severe midsternal chest pain associated with NBNB emesis. Chest pain described as severe non radiating band like sternal pain which started around 3pm on 12/22. Denied having chest pain before. Patient was unable to state how long the pain lasted, however was not experiencing the pain at the time of this encounter.  Denied any associated shortness of breath, palpitations, lightheadedness, dizziness. Patient/sister also had concerns re his blood sugar, and there was concern for DKA while in the ED. Patient endorsed noncompliance to his diabetic medications (for an unknown time period). Patients sister reported that he previously on insulin over a year ago, but it was discontinued due to fear/agitation with needless.    ED Course  Glucose was max 400s, labs notable for DKA (+B-hydroxybutyrate, +ketones, AG 20, ph 7.26) and was started on insulin drip; MICU was consulted. Cardiac enzymes were elevated with HS trop 913, , 35.27. EKG w/ NSR and no LISETTE, cardiology was consulted.     Hospital Course  Patient was seen by cardiology for his elevated enzymes and chest pain and was found to have a type II NSTEMI. He was started on a heparin drip and  loaded with aspirin and Brilinta, and was initially started on beta blocker. As per cardiology recommendations his beta blocker was discontinued and he was started on lisinopril 5mg. Brilinta was switched to Plavix. Cardiology evaluation determined that cath was not indicated.    In regards to his DKA, he was initially on an insulin drip and was transitioned to subcutaneous lantus and premeal humalog insulin. His regimen was titrated while he was admitted and he will be discharged on Lantus 29 units in the mornings, and 15 units of Humalog premeal.  Pt seen by endocrinology during course. Pt deemed to be high risk of DKA (DKA proned type II DM) and Lantus should not be held.    Psychiatry was consulted for the patients agitation/aggression. He had multiple episodes of aggression/agitation during the hospital stay. Pt was treated w/ haldol and benadryl for agitation, and had to be placed on restraints on multiple occasions. He was seen by psych and his meds were adjusted. He was eventually  placed on Haldol 20mg qhs and  depakote 750mg PO BID, w/ PRN antipsychotics. Pt was deemed not to be a ele candidate as per psych due to difficulty with insulin compliance.    At the time of discharge, patient's condition had improved, he was afebrile and hemodynamically stable. Insulin teaching was performed with patient's family member - both pens and vial

## 2018-12-29 NOTE — DISCHARGE NOTE ADULT - PLAN OF CARE
Control of glucose levels You were admitted to the hospital because your blood glucose levels were really high. You were initially on an insulin drip and then transitioned to subcutaneous insulin. You glucose levels were controlled on 24 units of Lantus in the morning and 10 units of Humalog before meals. You will need to continue taking insulin when you leave the hospital. Please follow up with your endocrinologists within 1 to 2 weeks of being discharged You had chest pain that was due to a non- ST elevation myocardial infarction (NSTEMI) (a form of heart attack). You were seen by the cardiologists and determined not to need invasive intervention. Please continue taking Aspirin, Plavix, and Lisinopril as prescribed Please continue taking your medications as prescribed Continue with supportive care at the group home. You were admitted to the hospital because your blood glucose levels were really high. You were initially on an insulin drip and then transitioned to subcutaneous insulin. Your glucose levels were controlled on 26 units of Lantus in the morning and 14 units of Humalog before meals. You will need to continue taking insulin when you leave the hospital. Please follow up with your endocrinologists within 1 to 2 weeks of being discharged. You may call [facility] at [phone] for appointment. You were admitted to the hospital because your blood glucose levels were really high. You were initially on an insulin drip and then transitioned to subcutaneous insulin. Your glucose levels were controlled on 29 units of Lantus in the morning and 15 units of Humalog before meals. You will need to continue taking insulin when you leave the hospital. Please follow up with your endocrinologists within 1 to 2 weeks of being discharged. You may call [facility] at [phone] for appointment. Please follow up with cardiology in 1-2 weeks, please call to make an appointment. Continue with Aspirin and plavix Please follow up with cardiology in 1-2 weeks, please call to make an appointment. Continue with Aspirin and plavix as prescribed

## 2018-12-29 NOTE — PROGRESS NOTE ADULT - PROBLEM SELECTOR PLAN 3
-c/w home meds  ---Haldol 20mg qhs; Depakote 500 in the AM and 750 at night  -F/U psych recs  -----Haldol 5mg PRN  -----Zyprexa 5mg PRN  -----Ativan 2mg PRN

## 2018-12-29 NOTE — DISCHARGE NOTE ADULT - NS AS DC AMI BB CONTRA
other reasons documentated by Physician / Nurse Practitioner / Physician Assistant or Pharmacist other reasons documentated by Physician / Nurse Practitioner / Physician Assistant or Pharmacist/Risk of hypotension

## 2018-12-29 NOTE — DISCHARGE NOTE ADULT - MEDICATION SUMMARY - MEDICATIONS TO TAKE
I will START or STAY ON the medications listed below when I get home from the hospital:    glucometer (per patient's insurance)  -- Test blood sugars four times a day. Dispense #1 glucometer.  -- Indication: For Diabetes    alcohol swabs   -- Apply on skin to affected area 4 times a day   -- Indication: For Diabetes    lancets  -- 1 application subcutaneously 4 times a day   -- Indication: For Diabetes    test strips (per patient's insurance)  -- 1 application subcutaneously 4 times a day. ** Compatible with patient's glucometer **  -- Indication: For Diabetes    U-100 Insulin Syringe, 3/10 mL  -- 1 application subcutaneously 2 times a day ** 3/10 mL holds up to 30 units of insulin **   -- Indication: For Diabetes    aspirin 81 mg oral tablet, chewable  -- 1 tab(s) by mouth once a day  -- Indication: For Cardioprotective    lisinopril 5 mg oral tablet  -- 1 tab(s) by mouth once a day  -- Indication: For HTN    divalproex sodium 250 mg oral delayed release tablet  -- 3 tab(s) by mouth 2 times a day   -- Indication: For Schizo-affective schizophrenia    Lantus 100 units/mL subcutaneous solution  -- 29 unit(s) subcutaneous once a day (in the morning)   -- Do not drink alcoholic beverages when taking this medication.  It is very important that you take or use this exactly as directed.  Do not skip doses or discontinue unless directed by your doctor.  Keep in refrigerator.  Do not freeze.    -- Indication: For Diabetes    HumaLOG 100 units/mL injectable solution  -- 15 unit(s) injectable 3 times a day (before meals)     Please dispense 2 vials  -- Indication: For Diabetes    atorvastatin 80 mg oral tablet  -- 1 tab(s) by mouth once a day (at bedtime)  -- Indication: For HLD    benztropine 1 mg oral tablet  -- 1 tab(s) by mouth 2 times a day  -- Indication: For Bipolar disorder, unspecified    clopidogrel 75 mg oral tablet  -- 1 tab(s) by mouth once a day  -- Indication: For Cardioprotective    haloperidol 20 mg oral tablet  -- 1 tab(s) by mouth once a day (at bedtime)  -- Indication: For Agitation I will START or STAY ON the medications listed below when I get home from the hospital:    glucometer (per patient's insurance)  -- Test blood sugars four times a day. Dispense #1 glucometer.  -- Indication: For Diabetes    alcohol swabs   -- Apply on skin to affected area 4 times a day   -- Indication: For Diabetes    lancets  -- 1 application subcutaneously 4 times a day   -- Indication: For Diabetes    test strips (per patient's insurance)  -- 1 application subcutaneously 4 times a day. ** Compatible with patient's glucometer **  -- Indication: For Diabetes    Insulin Pen Needles, 4mm  -- 1 application subcutaneously 4 times a day. ** Use with insulin pen **   -- Indication: For Diabetes    aspirin 81 mg oral tablet, chewable  -- 1 tab(s) by mouth once a day  -- Indication: For Cardioprotective    lisinopril 5 mg oral tablet  -- 1 tab(s) by mouth once a day  -- Indication: For HTN    divalproex sodium 250 mg oral delayed release tablet  -- 3 tab(s) by mouth 2 times a day   -- Indication: For Schizo-affective schizophrenia    Levemir FlexTouch 100 units/mL subcutaneous solution  -- 29 unit(s) subcutaneous once a day (in the morning)     Please Dispense 3 pens  -- Check with your doctor before becoming pregnant.  Do not drink alcoholic beverages when taking this medication.  Keep in refrigerator.  Do not freeze.    -- Indication: For Diabetes    NovoLOG FlexPen 100 units/mL injectable solution  -- 15 unit(s) injectable 3 times a day (before meals)     please dispense 5 pens  -- Check with your doctor before becoming pregnant.  Do not drink alcoholic beverages when taking this medication.  Keep in refrigerator.  Do not freeze.  Obtain medical advice before taking any non-prescription drugs as some may affect the action of this medication.    -- Indication: For Diabetes    atorvastatin 80 mg oral tablet  -- 1 tab(s) by mouth once a day (at bedtime)  -- Indication: For Hyperlipidemia    benztropine 1 mg oral tablet  -- 1 tab(s) by mouth 2 times a day  -- Indication: For Bipolar disorder, unspecified    clopidogrel 75 mg oral tablet  -- 1 tab(s) by mouth once a day  -- Indication: For Cardioprotective    haloperidol 20 mg oral tablet  -- 1 tab(s) by mouth once a day (at bedtime)  -- Indication: For Bipolar disorder, unspecified

## 2018-12-29 NOTE — PROVIDER CONTACT NOTE (OTHER) - RECOMMENDATIONS
giving insulin
Recommend for Pt to be given PRNS, have MD come assess patient and contact security to maintain patient's safety. RUDDY Blackmon contacted to assess.
dextrose for low glucose as patient lethargic and not eating.

## 2018-12-29 NOTE — PROVIDER CONTACT NOTE (OTHER) - ACTION/TREATMENT ORDERED:
giving insulin
Security contacted and responded to assist with patient's safety. MD Sanchez came to unit to assess Pt and present during medication administration. RUDDY Blackmon came to unit to assess.
per MD solano to hold PO meds at this time for lethargy. dextrose ordered as patient not eating at this time. will continue to assess.

## 2018-12-29 NOTE — DISCHARGE NOTE ADULT - MEDICATION SUMMARY - MEDICATIONS TO CHANGE
I will SWITCH the dose or number of times a day I take the medications listed below when I get home from the hospital:    divalproex sodium 500 mg oral delayed release tablet  -- 1 tab(s) by mouth 2 times a day    atorvastatin 20 mg oral tablet  -- 1 tab(s) by mouth once a day

## 2018-12-29 NOTE — PROVIDER CONTACT NOTE (OTHER) - BACKGROUND
Pt is a 28 year old male with an intellectual disability, schizophrenia and bipolar disorder. Pt received agitated and non adherent to taking medications.

## 2018-12-29 NOTE — DISCHARGE NOTE ADULT - CARE PROVIDER_API CALL
[facility] Endocrinology,   Phone: [phone]  Fax: (       - [facility] Endocrinology,   Phone: [phone]  Fax: (   )    -    Martin Morejon (MD), Cardiovascular Disease; Nuclear Cardiology  [address] [place] [address]  Phone: [phone]  Fax: [phone]

## 2018-12-29 NOTE — PROGRESS NOTE ADULT - PROBLEM SELECTOR PLAN 1
Type II NSTEMI in the setting of DKA; Cardiac enzymes were elevated with HS trop 913, , 35.27. EKG w/ NSR and no LISETTE  -S/P Aspirin an Brilinta load   -c/w Asp  -C/w Plavix 75mg daily  -c/w lisinopril 5mg daily   -c/w heparin drip  -trend trops  -c/w Atorvastatin  -F/U TTE  -Cardiology consulted, recs appreciated;  ----no plan for cath at this time Type II NSTEMI in the setting of DKA; Cardiac enzymes were elevated with HS trop 913, , 35.27. EKG w/ NSR and no LISETTE  -S/P Aspirin an Brilinta load   -c/w Asa  -C/w Plavix 75mg daily  -c/w lisinopril 5mg daily   -trend trops  -c/w Atorvastatin  -Cardiology consulted, recs appreciated;  ----no plan for cath at this time

## 2018-12-29 NOTE — DISCHARGE NOTE ADULT - CARE PLAN
Principal Discharge DX:	DKA (diabetic ketoacidoses)  Goal:	Control of glucose levels  Assessment and plan of treatment:	You were admitted to the hospital because your blood glucose levels were really high. You were initially on an insulin drip and then transitioned to subcutaneous insulin. You glucose levels were controlled on 24 units of Lantus in the morning and 10 units of Humalog before meals. You will need to continue taking insulin when you leave the hospital. Please follow up with your endocrinologists within 1 to 2 weeks of being discharged  Secondary Diagnosis:	ACS (acute coronary syndrome)  Assessment and plan of treatment:	You had chest pain that was due to a non- ST elevation myocardial infarction (NSTEMI) (a form of heart attack). You were seen by the cardiologists and determined not to need invasive intervention. Please continue taking Aspirin, Plavix, and Lisinopril as prescribed  Secondary Diagnosis:	Bipolar disorder, unspecified  Assessment and plan of treatment:	Please continue taking your medications as prescribed Principal Discharge DX:	DKA (diabetic ketoacidoses)  Goal:	Control of glucose levels  Assessment and plan of treatment:	You were admitted to the hospital because your blood glucose levels were really high. You were initially on an insulin drip and then transitioned to subcutaneous insulin. You glucose levels were controlled on 24 units of Lantus in the morning and 10 units of Humalog before meals. You will need to continue taking insulin when you leave the hospital. Please follow up with your endocrinologists within 1 to 2 weeks of being discharged  Secondary Diagnosis:	ACS (acute coronary syndrome)  Assessment and plan of treatment:	You had chest pain that was due to a non- ST elevation myocardial infarction (NSTEMI) (a form of heart attack). You were seen by the cardiologists and determined not to need invasive intervention. Please continue taking Aspirin, Plavix, and Lisinopril as prescribed  Secondary Diagnosis:	Bipolar disorder, unspecified  Assessment and plan of treatment:	Please continue taking your medications as prescribed  Secondary Diagnosis:	Intermittent explosive disorder in adult  Assessment and plan of treatment:	Please continue taking your medications as prescribed  Secondary Diagnosis:	Intellectual disability  Assessment and plan of treatment:	Continue with supportive care at the group home. Principal Discharge DX:	DKA (diabetic ketoacidoses)  Goal:	Control of glucose levels  Assessment and plan of treatment:	You were admitted to the hospital because your blood glucose levels were really high. You were initially on an insulin drip and then transitioned to subcutaneous insulin. Your glucose levels were controlled on 26 units of Lantus in the morning and 14 units of Humalog before meals. You will need to continue taking insulin when you leave the hospital. Please follow up with your endocrinologists within 1 to 2 weeks of being discharged. You may call [facility] Endocrinology at [phone] for appointment.  Secondary Diagnosis:	ACS (acute coronary syndrome)  Assessment and plan of treatment:	You had chest pain that was due to a non- ST elevation myocardial infarction (NSTEMI) (a form of heart attack). You were seen by the cardiologists and determined not to need invasive intervention. Please continue taking Aspirin, Plavix, and Lisinopril as prescribed  Secondary Diagnosis:	Bipolar disorder, unspecified  Assessment and plan of treatment:	Please continue taking your medications as prescribed  Secondary Diagnosis:	Intermittent explosive disorder in adult  Assessment and plan of treatment:	Please continue taking your medications as prescribed Principal Discharge DX:	DKA (diabetic ketoacidoses)  Goal:	Control of glucose levels  Assessment and plan of treatment:	You were admitted to the hospital because your blood glucose levels were really high. You were initially on an insulin drip and then transitioned to subcutaneous insulin. Your glucose levels were controlled on 29 units of Lantus in the morning and 15 units of Humalog before meals. You will need to continue taking insulin when you leave the hospital. Please follow up with your endocrinologists within 1 to 2 weeks of being discharged. You may call [facility] Endocrinology at [phone] for appointment.  Secondary Diagnosis:	ACS (acute coronary syndrome)  Assessment and plan of treatment:	You had chest pain that was due to a non- ST elevation myocardial infarction (NSTEMI) (a form of heart attack). You were seen by the cardiologists and determined not to need invasive intervention. Please continue taking Aspirin, Plavix, and Lisinopril as prescribed  Secondary Diagnosis:	Bipolar disorder, unspecified  Assessment and plan of treatment:	Please continue taking your medications as prescribed  Secondary Diagnosis:	Intermittent explosive disorder in adult  Assessment and plan of treatment:	Please continue taking your medications as prescribed Principal Discharge DX:	DKA (diabetic ketoacidoses)  Goal:	Control of glucose levels  Assessment and plan of treatment:	You were admitted to the hospital because your blood glucose levels were really high. You were initially on an insulin drip and then transitioned to subcutaneous insulin. Your glucose levels were controlled on 29 units of Lantus in the morning and 15 units of Humalog before meals. You will need to continue taking insulin when you leave the hospital. Please follow up with your endocrinologists within 1 to 2 weeks of being discharged. You may call [facility] Endocrinology at [phone] for appointment.  Secondary Diagnosis:	ACS (acute coronary syndrome)  Assessment and plan of treatment:	You had chest pain that was due to a non- ST elevation myocardial infarction (NSTEMI) (a form of heart attack). You were seen by the cardiologists and determined not to need invasive intervention. Please continue taking Aspirin, Plavix, and Lisinopril as prescribed  Secondary Diagnosis:	Bipolar disorder, unspecified  Assessment and plan of treatment:	Please continue taking your medications as prescribed  Secondary Diagnosis:	Intermittent explosive disorder in adult  Assessment and plan of treatment:	Please continue taking your medications as prescribed  Secondary Diagnosis:	NSTEMI (non-ST elevated myocardial infarction)  Assessment and plan of treatment:	Please follow up with cardiology in 1-2 weeks, please call to make an appointment. Continue with Aspirin and plavix Principal Discharge DX:	DKA (diabetic ketoacidoses)  Goal:	Control of glucose levels  Assessment and plan of treatment:	You were admitted to the hospital because your blood glucose levels were really high. You were initially on an insulin drip and then transitioned to subcutaneous insulin. Your glucose levels were controlled on 29 units of Lantus in the morning and 15 units of Humalog before meals. You will need to continue taking insulin when you leave the hospital. Please follow up with your endocrinologists within 1 to 2 weeks of being discharged. You may call [facility] Endocrinology at [phone] for appointment.  Secondary Diagnosis:	ACS (acute coronary syndrome)  Assessment and plan of treatment:	You had chest pain that was due to a non- ST elevation myocardial infarction (NSTEMI) (a form of heart attack). You were seen by the cardiologists and determined not to need invasive intervention. Please continue taking Aspirin, Plavix, and Lisinopril as prescribed  Secondary Diagnosis:	Bipolar disorder, unspecified  Assessment and plan of treatment:	Please continue taking your medications as prescribed  Secondary Diagnosis:	Intermittent explosive disorder in adult  Assessment and plan of treatment:	Please continue taking your medications as prescribed  Secondary Diagnosis:	NSTEMI (non-ST elevated myocardial infarction)  Assessment and plan of treatment:	Please follow up with cardiology in 1-2 weeks, please call to make an appointment. Continue with Aspirin and plavix as prescribed

## 2018-12-29 NOTE — DISCHARGE NOTE ADULT - MEDICATION SUMMARY - MEDICATIONS TO STOP TAKING
I will STOP taking the medications listed below when I get home from the hospital:    propranolol 40 mg oral tablet  -- 1 tab(s) by mouth 2 times a day    metFORMIN 1000 mg oral tablet  -- 1 tab(s) by mouth 2 times a day

## 2018-12-30 LAB
BUN SERPL-MCNC: 4 MG/DL — LOW (ref 7–23)
CALCIUM SERPL-MCNC: 9.2 MG/DL — SIGNIFICANT CHANGE UP (ref 8.4–10.5)
CHLORIDE SERPL-SCNC: 105 MMOL/L — SIGNIFICANT CHANGE UP (ref 98–107)
CO2 SERPL-SCNC: 25 MMOL/L — SIGNIFICANT CHANGE UP (ref 22–31)
CREAT SERPL-MCNC: 0.68 MG/DL — SIGNIFICANT CHANGE UP (ref 0.5–1.3)
GLUCOSE BLDC GLUCOMTR-MCNC: 154 MG/DL — HIGH (ref 70–99)
GLUCOSE BLDC GLUCOMTR-MCNC: 165 MG/DL — HIGH (ref 70–99)
GLUCOSE BLDC GLUCOMTR-MCNC: 199 MG/DL — HIGH (ref 70–99)
GLUCOSE BLDC GLUCOMTR-MCNC: 218 MG/DL — HIGH (ref 70–99)
GLUCOSE SERPL-MCNC: 149 MG/DL — HIGH (ref 70–99)
MAGNESIUM SERPL-MCNC: 1.7 MG/DL — SIGNIFICANT CHANGE UP (ref 1.6–2.6)
PHOSPHATE SERPL-MCNC: 4.1 MG/DL — SIGNIFICANT CHANGE UP (ref 2.5–4.5)
POTASSIUM SERPL-MCNC: 4.5 MMOL/L — SIGNIFICANT CHANGE UP (ref 3.5–5.3)
POTASSIUM SERPL-SCNC: 4.5 MMOL/L — SIGNIFICANT CHANGE UP (ref 3.5–5.3)
SODIUM SERPL-SCNC: 141 MMOL/L — SIGNIFICANT CHANGE UP (ref 135–145)

## 2018-12-30 PROCEDURE — 99233 SBSQ HOSP IP/OBS HIGH 50: CPT | Mod: GC

## 2018-12-30 RX ORDER — HALOPERIDOL DECANOATE 100 MG/ML
5 INJECTION INTRAMUSCULAR ONCE
Qty: 0 | Refills: 0 | Status: DISCONTINUED | OUTPATIENT
Start: 2018-12-30 | End: 2018-12-30

## 2018-12-30 RX ORDER — DIPHENHYDRAMINE HCL 50 MG
25 CAPSULE ORAL ONCE
Qty: 0 | Refills: 0 | Status: COMPLETED | OUTPATIENT
Start: 2018-12-30 | End: 2018-12-30

## 2018-12-30 RX ORDER — DIPHENHYDRAMINE HCL 50 MG
50 CAPSULE ORAL ONCE
Qty: 0 | Refills: 0 | Status: COMPLETED | OUTPATIENT
Start: 2018-12-30 | End: 2018-12-30

## 2018-12-30 RX ORDER — HALOPERIDOL DECANOATE 100 MG/ML
5 INJECTION INTRAMUSCULAR ONCE
Qty: 0 | Refills: 0 | Status: COMPLETED | OUTPATIENT
Start: 2018-12-30 | End: 2018-12-30

## 2018-12-30 RX ADMIN — LISINOPRIL 5 MILLIGRAM(S): 2.5 TABLET ORAL at 06:48

## 2018-12-30 RX ADMIN — DIVALPROEX SODIUM 750 MILLIGRAM(S): 500 TABLET, DELAYED RELEASE ORAL at 21:24

## 2018-12-30 RX ADMIN — DIVALPROEX SODIUM 500 MILLIGRAM(S): 500 TABLET, DELAYED RELEASE ORAL at 09:13

## 2018-12-30 RX ADMIN — Medication 50 MILLIGRAM(S): at 11:59

## 2018-12-30 RX ADMIN — HALOPERIDOL DECANOATE 20 MILLIGRAM(S): 100 INJECTION INTRAMUSCULAR at 21:16

## 2018-12-30 RX ADMIN — HALOPERIDOL DECANOATE 5 MILLIGRAM(S): 100 INJECTION INTRAMUSCULAR at 10:53

## 2018-12-30 RX ADMIN — ENOXAPARIN SODIUM 40 MILLIGRAM(S): 100 INJECTION SUBCUTANEOUS at 10:14

## 2018-12-30 RX ADMIN — ATORVASTATIN CALCIUM 80 MILLIGRAM(S): 80 TABLET, FILM COATED ORAL at 21:16

## 2018-12-30 RX ADMIN — Medication 1 MILLIGRAM(S): at 21:24

## 2018-12-30 RX ADMIN — INSULIN GLARGINE 24 UNIT(S): 100 INJECTION, SOLUTION SUBCUTANEOUS at 09:12

## 2018-12-30 RX ADMIN — Medication 81 MILLIGRAM(S): at 10:21

## 2018-12-30 RX ADMIN — Medication 1 MILLIGRAM(S): at 10:21

## 2018-12-30 RX ADMIN — Medication 1: at 09:15

## 2018-12-30 RX ADMIN — CLOPIDOGREL BISULFATE 75 MILLIGRAM(S): 75 TABLET, FILM COATED ORAL at 10:21

## 2018-12-30 RX ADMIN — Medication 10 UNIT(S): at 17:47

## 2018-12-30 RX ADMIN — Medication 10 UNIT(S): at 09:15

## 2018-12-30 RX ADMIN — Medication 2: at 13:17

## 2018-12-30 RX ADMIN — Medication 1: at 17:47

## 2018-12-30 RX ADMIN — Medication 2 MILLIGRAM(S): at 21:17

## 2018-12-30 RX ADMIN — Medication 2 MILLIGRAM(S): at 10:12

## 2018-12-30 RX ADMIN — Medication 10 UNIT(S): at 13:18

## 2018-12-30 NOTE — PROVIDER CONTACT NOTE (MEDICATION) - RECOMMENDATIONS
IM Haldol to be held at this time because of PO Haldol administration. Pt should remain on one to one.

## 2018-12-30 NOTE — PROVIDER CONTACT NOTE (MEDICATION) - SITUATION
Pt was restless and agitated throughout PM. Pt did not take PM medications until 2330 on 12/29/18 and was ordered IM Haldol at the same time of the PO Haldol administration.

## 2018-12-30 NOTE — PROGRESS NOTE ADULT - PROBLEM SELECTOR PLAN 2
Likely in setting of poorly uncontrolled DM and non-compliance with diet. r/o infectious source. UA negative. CXR clear.   -AG closed  -Maintain K >4  -Endo consult  -- c/w Lantus 24 at bedtime; Humalog 10 premeal  --F/U LIANNA (neg) and Islet cells; C-peptide

## 2018-12-30 NOTE — CHART NOTE - NSCHARTNOTEFT_GEN_A_CORE
Per nurse at bedside, pt severely agitated, attempting to attack staff. Already given 2mg ativan without improvement. Assessed at bedside, pt very combative attempting to bite staff. Attempts were made to give haldol via IV, but patient was not allowing staff to do so. 4 point restraints were ordered and pt needed to restrained by staff at bedside. 5 mg haldol IV was given. Will assess for improvement.     Charlie Calvo, PGY-2  Internal Medicine   P: [phone]

## 2018-12-30 NOTE — PROGRESS NOTE ADULT - PROBLEM SELECTOR PLAN 1
Resolved  Type II NSTEMI in the setting of DKA; Cardiac enzymes were elevated with HS trop 913, , 35.27. EKG w/ NSR and no LISETTE  -S/P Aspirin an Brilinta load   -c/w Asa  -C/w Plavix 75mg daily  -c/w lisinopril 5mg daily   -trops peaked, no longer trending  -c/w Atorvastatin  -Cardiology consulted, recs appreciated;  ----no plan for cath at this time

## 2018-12-30 NOTE — PROGRESS NOTE ADULT - SUBJECTIVE AND OBJECTIVE BOX
Charlie Calvo MD PGY-2  Contact/Pager - [phone]      Patient is a 28y old  Male who presents with a chief complaint of Chest Pain, DKA (29 Dec 2018 11:23)        SUBJECTIVE / OVERNIGHT EVENTS: Pt locked himself in bathroom last night and refused medications, security was called and haldol 1 mg IV was ordered. Haldol was not given, and pt ended up taking his medications.       MEDICATIONS  (STANDING):  aspirin  chewable 81 milliGRAM(s) Oral daily  atorvastatin 80 milliGRAM(s) Oral at bedtime  benztropine 1 milliGRAM(s) Oral two times a day  clopidogrel Tablet 75 milliGRAM(s) Oral daily  dextrose 5%. 1000 milliLiter(s) (50 mL/Hr) IV Continuous <Continuous>  dextrose 50% Injectable 12.5 Gram(s) IV Push once  dextrose 50% Injectable 25 Gram(s) IV Push once  dextrose 50% Injectable 25 Gram(s) IV Push once  diVALproex  milliGRAM(s) Oral at bedtime  diVALproex  milliGRAM(s) Oral <User Schedule>  enoxaparin Injectable 40 milliGRAM(s) SubCutaneous every 24 hours  haloperidol     Tablet 20 milliGRAM(s) Oral at bedtime  insulin glargine Injectable (LANTUS) 24 Unit(s) SubCutaneous every morning  insulin lispro (HumaLOG) corrective regimen sliding scale   SubCutaneous at bedtime  insulin lispro (HumaLOG) corrective regimen sliding scale   SubCutaneous three times a day before meals  insulin lispro Injectable (HumaLOG) 10 Unit(s) SubCutaneous three times a day before meals  lisinopril 5 milliGRAM(s) Oral daily    MEDICATIONS  (PRN):  dextrose 40% Gel 15 Gram(s) Oral once PRN Blood Glucose LESS THAN 70 milliGRAM(s)/deciliter  glucagon  Injectable 1 milliGRAM(s) IntraMuscular once PRN Glucose LESS THAN 70 milligrams/deciliter  LORazepam   Injectable 2 milliGRAM(s) IV Push every 6 hours PRN Agitation  OLANZapine Injectable 5 milliGRAM(s) IntraMuscular every 6 hours PRN Severe Agitation      Allergies    No Known Allergies    Intolerances          Vital Signs Last 24 Hrs  T(C): 36.3 (30 Dec 2018 06:45), Max: 37.1 (29 Dec 2018 13:50)  T(F): 97.4 (30 Dec 2018 06:45), Max: 98.7 (29 Dec 2018 13:50)  HR: 79 (30 Dec 2018 06:45) (79 - 106)  BP: 107/63 (30 Dec 2018 06:45) (100/61 - 116/65)  BP(mean): --  RR: 17 (30 Dec 2018 06:45) (16 - 18)  SpO2: 100% (30 Dec 2018 06:45) (96% - 100%)  CAPILLARY BLOOD GLUCOSE      POCT Blood Glucose.: 170 mg/dL (29 Dec 2018 22:06)  POCT Blood Glucose.: 115 mg/dL (29 Dec 2018 17:52)  POCT Blood Glucose.: 231 mg/dL (29 Dec 2018 12:48)  POCT Blood Glucose.: 200 mg/dL (29 Dec 2018 08:55)    I&O's Summary    28 Dec 2018 07:01  -  29 Dec 2018 07:00  --------------------------------------------------------  IN: 200 mL / OUT: 0 mL / NET: 200 mL    29 Dec 2018 07:01  -  30 Dec 2018 06:53  --------------------------------------------------------  IN: 300 mL / OUT: 0 mL / NET: 300 mL        PHYSICAL EXAM   General:  man in NAD.   HEENT:  PERRL, EOMI	  Cardiovascular: increased heart rate, regular rhythm, Normal S1 S2, No JVD, No murmurs appreciated  Respiratory: Lungs clear to auscultation	  Gastrointestinal:  Soft, Non-tender, + BS	  Skin: No rashes, No ecchymoses, No cyanosis	  Extremities: Normal range of motion, No clubbing, cyanosis or edema  Vascular: Peripheral pulses palpable 2+ bilaterally        LABS:                            RADIOLOGY & ADDITIONAL TESTS:    Imaging Personally Reviewed:     Consultant(s) Notes Reviewed:     Care Discussed with Consultants/Other Providers: Charlie Calvo MD PGY-2  Contact/Pager - [phone]    Patient is a 28y old  Male who presents with a chief complaint of Chest Pain, DKA (29 Dec 2018 11:23)    SUBJECTIVE / OVERNIGHT EVENTS: Pt locked himself in bathroom last night and refused medications, security was called and haldol 1 mg IV was ordered. Haldol was not given, and pt ended up taking his medications.     MEDICATIONS  (STANDING):  aspirin  chewable 81 milliGRAM(s) Oral daily  atorvastatin 80 milliGRAM(s) Oral at bedtime  benztropine 1 milliGRAM(s) Oral two times a day  clopidogrel Tablet 75 milliGRAM(s) Oral daily  dextrose 5%. 1000 milliLiter(s) (50 mL/Hr) IV Continuous <Continuous>  dextrose 50% Injectable 12.5 Gram(s) IV Push once  dextrose 50% Injectable 25 Gram(s) IV Push once  dextrose 50% Injectable 25 Gram(s) IV Push once  diVALproex  milliGRAM(s) Oral at bedtime  diVALproex  milliGRAM(s) Oral <User Schedule>  enoxaparin Injectable 40 milliGRAM(s) SubCutaneous every 24 hours  haloperidol     Tablet 20 milliGRAM(s) Oral at bedtime  insulin glargine Injectable (LANTUS) 24 Unit(s) SubCutaneous every morning  insulin lispro (HumaLOG) corrective regimen sliding scale   SubCutaneous at bedtime  insulin lispro (HumaLOG) corrective regimen sliding scale   SubCutaneous three times a day before meals  insulin lispro Injectable (HumaLOG) 10 Unit(s) SubCutaneous three times a day before meals  lisinopril 5 milliGRAM(s) Oral daily    MEDICATIONS  (PRN):  dextrose 40% Gel 15 Gram(s) Oral once PRN Blood Glucose LESS THAN 70 milliGRAM(s)/deciliter  glucagon  Injectable 1 milliGRAM(s) IntraMuscular once PRN Glucose LESS THAN 70 milligrams/deciliter  LORazepam   Injectable 2 milliGRAM(s) IV Push every 6 hours PRN Agitation  OLANZapine Injectable 5 milliGRAM(s) IntraMuscular every 6 hours PRN Severe Agitation      Allergies No Known Allergies    Vital Signs Last 24 Hrs  T(C): 36.3 (30 Dec 2018 06:45), Max: 37.1 (29 Dec 2018 13:50)  T(F): 97.4 (30 Dec 2018 06:45), Max: 98.7 (29 Dec 2018 13:50)  HR: 79 (30 Dec 2018 06:45) (79 - 106)  BP: 107/63 (30 Dec 2018 06:45) (100/61 - 116/65)  BP(mean): --  RR: 17 (30 Dec 2018 06:45) (16 - 18)  SpO2: 100% (30 Dec 2018 06:45) (96% - 100%)  CAPILLARY BLOOD GLUCOSE      POCT Blood Glucose.: 170 mg/dL (29 Dec 2018 22:06)  POCT Blood Glucose.: 115 mg/dL (29 Dec 2018 17:52)  POCT Blood Glucose.: 231 mg/dL (29 Dec 2018 12:48)  POCT Blood Glucose.: 200 mg/dL (29 Dec 2018 08:55)    I&O's Summary    28 Dec 2018 07:01  -  29 Dec 2018 07:00  --------------------------------------------------------  IN: 200 mL / OUT: 0 mL / NET: 200 mL    29 Dec 2018 07:01  -  30 Dec 2018 06:53  --------------------------------------------------------  IN: 300 mL / OUT: 0 mL / NET: 300 mL    PHYSICAL EXAM   General:  man in NAD.   HEENT:  PERRL, EOMI	  Cardiovascular: increased heart rate, regular rhythm, Normal S1 S2, No JVD, No murmurs appreciated  Respiratory: Lungs clear to auscultation	  Gastrointestinal:  Soft, Non-tender, + BS	  Skin: No rashes, No ecchymoses, No cyanosis	  Extremities: Normal range of motion, No clubbing, cyanosis or edema  Vascular: Peripheral pulses palpable 2+ bilaterally      LABS:    12-30    141  |  105  |  4<L>  ----------------------------<  149<H>  4.5   |  25  |  0.68    Ca    9.2      30 Dec 2018 06:45  Phos  4.1     12-30  Mg     1.7     12-30        Consultant(s) Notes Reviewed:   Care Discussed with Consultants/Other Providers:

## 2018-12-31 DIAGNOSIS — E13.10 OTHER SPECIFIED DIABETES MELLITUS WITH KETOACIDOSIS WITHOUT COMA: ICD-10-CM

## 2018-12-31 LAB
GLUCOSE BLDC GLUCOMTR-MCNC: 132 MG/DL — HIGH (ref 70–99)
VALPROATE SERPL-MCNC: 76.3 UG/ML — SIGNIFICANT CHANGE UP (ref 50–100)

## 2018-12-31 PROCEDURE — 99232 SBSQ HOSP IP/OBS MODERATE 35: CPT

## 2018-12-31 PROCEDURE — 99233 SBSQ HOSP IP/OBS HIGH 50: CPT | Mod: GC

## 2018-12-31 RX ORDER — HALOPERIDOL DECANOATE 100 MG/ML
5 INJECTION INTRAMUSCULAR ONCE
Qty: 0 | Refills: 0 | Status: COMPLETED | OUTPATIENT
Start: 2018-12-31 | End: 2018-12-31

## 2018-12-31 RX ORDER — INSULIN LISPRO 100/ML
10 VIAL (ML) SUBCUTANEOUS
Qty: 0 | Refills: 0 | Status: DISCONTINUED | OUTPATIENT
Start: 2018-12-31 | End: 2019-01-03

## 2018-12-31 RX ORDER — DIPHENHYDRAMINE HCL 50 MG
50 CAPSULE ORAL ONCE
Qty: 0 | Refills: 0 | Status: COMPLETED | OUTPATIENT
Start: 2018-12-31 | End: 2018-12-31

## 2018-12-31 RX ORDER — DIPHENHYDRAMINE HCL 50 MG
50 CAPSULE ORAL ONCE
Qty: 0 | Refills: 0 | Status: DISCONTINUED | OUTPATIENT
Start: 2018-12-31 | End: 2018-12-31

## 2018-12-31 RX ORDER — INSULIN LISPRO 100/ML
11 VIAL (ML) SUBCUTANEOUS
Qty: 0 | Refills: 0 | Status: DISCONTINUED | OUTPATIENT
Start: 2019-01-01 | End: 2019-01-01

## 2018-12-31 RX ADMIN — DIVALPROEX SODIUM 500 MILLIGRAM(S): 500 TABLET, DELAYED RELEASE ORAL at 09:23

## 2018-12-31 RX ADMIN — ENOXAPARIN SODIUM 40 MILLIGRAM(S): 100 INJECTION SUBCUTANEOUS at 12:26

## 2018-12-31 RX ADMIN — Medication 2 MILLIGRAM(S): at 12:26

## 2018-12-31 RX ADMIN — OLANZAPINE 5 MILLIGRAM(S): 15 TABLET, FILM COATED ORAL at 14:14

## 2018-12-31 RX ADMIN — LISINOPRIL 5 MILLIGRAM(S): 2.5 TABLET ORAL at 06:15

## 2018-12-31 RX ADMIN — Medication 1 MILLIGRAM(S): at 12:27

## 2018-12-31 RX ADMIN — Medication 50 MILLIGRAM(S): at 21:43

## 2018-12-31 RX ADMIN — INSULIN GLARGINE 24 UNIT(S): 100 INJECTION, SOLUTION SUBCUTANEOUS at 09:22

## 2018-12-31 RX ADMIN — Medication 81 MILLIGRAM(S): at 12:27

## 2018-12-31 RX ADMIN — Medication 2 MILLIGRAM(S): at 21:29

## 2018-12-31 RX ADMIN — Medication 3: at 18:08

## 2018-12-31 RX ADMIN — Medication 10 UNIT(S): at 09:23

## 2018-12-31 RX ADMIN — HALOPERIDOL DECANOATE 5 MILLIGRAM(S): 100 INJECTION INTRAMUSCULAR at 21:51

## 2018-12-31 RX ADMIN — Medication 10 UNIT(S): at 18:08

## 2018-12-31 RX ADMIN — CLOPIDOGREL BISULFATE 75 MILLIGRAM(S): 75 TABLET, FILM COATED ORAL at 12:27

## 2018-12-31 NOTE — PROGRESS NOTE ADULT - ASSESSMENT
28 year old man history of uncontrolled T2DM on oral Metformin, intellectual disability, bipolar vs schizophrenia presents to the ED on 12/22 with complaints of chest pain with Type II NSTEMI w/o indication for cath.  Also presented with NBNB; found to be with elevated glucose with concern for mild DKA; now resolved. 28 year old man history of uncontrolled T2DM on oral Metformin, intellectual disability, bipolar vs schizophrenia presents to the ED on 12/22 with complaints of chest pain with Type II NSTEMI w/o indication for cath.  Also presented with NBNB; found to be with elevated glucose with concern for mild DKA; now resolved. Pt w/ repeated episodes of agitation/ aggression requiring restraints and antipsychotics. Currently pending placement into group home 28 year old man history of uncontrolled T2DM on oral Metformin, intellectual disability, bipolar vs schizophrenia presents to the ED on 12/22 with complaints of chest pain, with Type II NSTEMI w/o indication for cath.  Also presented with NBNB; found to be with elevated glucose with concern for mild DKA; now resolved. Pt w/ repeated episodes of agitation/ aggression requiring restraints and antipsychotics. Currently pending placement into group home.

## 2018-12-31 NOTE — PROGRESS NOTE ADULT - PROBLEM SELECTOR PLAN 1
-c/w home meds  ---Haldol 20mg qhs; Depakote 500 in the AM and 750 at night  -F/U psych recs  -----Haldol 5mg PRN  -----Zyprexa 5mg PRN  -----Ativan 2mg PRN -Pt had to be placed in restraints secondary to agitation yesterday, has been calm overnight since restraints were taken off  -c/w home meds  ---Haldol 20mg qhs; Depakote 500 in the AM and 750 at night  -F/U psych recs  -----Haldol 5mg PRN  -----Zyprexa 5mg PRN  -----Ativan 2mg PRN -Pt had to be placed in restraints secondary to agitation yesterday; pt very combative attempting to bite staff. 4 point restraints were ordered and pt needed to restrained by staff at bedside. 5 mg haldol IV was given. Pt now off restraints and appears calm.   -c/w home meds  ---Haldol 20mg qhs; Depakote 500 in the AM and 750 at night  -F/U psych recs  -----Haldol 5mg PRN  -----Zyprexa 5mg PRN  -----Ativan 2mg PRN

## 2018-12-31 NOTE — PROGRESS NOTE ADULT - PROBLEM SELECTOR PLAN 2
Resolved  Type II NSTEMI in the setting of DKA; Cardiac enzymes were elevated with HS trop 913, , 35.27. EKG w/ NSR and no LISETTE  -S/P Aspirin an Brilinta load   -c/w Asa  -C/w Plavix 75mg daily  -c/w lisinopril 5mg daily   -trops peaked, no longer trending  -c/w Atorvastatin  -Cardiology consulted, recs appreciated;  ----no plan for cath at this time Resolved  Type II NSTEMI in the setting of DKA; Cardiac enzymes were elevated with HS trop 913, , 35.27. EKG w/ NSR and no LISETTE  -S/P Aspirin an Brilinta load   -c/w Asa  -C/w Plavix 75mg daily  -c/w lisinopril 5mg daily   -trops peaked, no longer trending  -c/w Atorvastatin  -no plan for cath at this time as per Cards Resolved  FS 130s-200s, well controlled, will continue to monitor with current regimen    Initially occured likely in setting of poorly uncontrolled DM and non-compliance with diet. r/o infectious source. UA negative. CXR clear.   -AG closed  -Maintain K >4  -- c/w Lantus 24 at bedtime; Humalog 10 premeal  -pt seen by endo - Initially occurred likely in setting of poorly uncontrolled DM and non-compliance with diet. no clear infectious source. UA negative. CXR clear.   - Resolved, AG closed  - FS now 130s-200s, better controlled, will continue to monitor with current regimen  - appreciate endocrine recs  - c/w Lantus 24U at bedtime; premeal Humalog 11/10/10, HISS.

## 2018-12-31 NOTE — PROGRESS NOTE ADULT - PROBLEM SELECTOR PLAN 3
Resolved    FS 130s-200s, well controlled, will cotn  Likely in setting of poorly uncontrolled DM and non-compliance with diet. r/o infectious source. UA negative. CXR clear.   -AG closed  -Maintain K >4  -Endo consult  -- c/w Lantus 24 at bedtime; Humalog 10 premeal Resolved  FS 130s-200s, well controlled, will continue to monitor with current regimen    Likely in setting of poorly uncontrolled DM and non-compliance with diet. r/o infectious source. UA negative. CXR clear.   -AG closed  -Maintain K >4  -Endo consult  -- c/w Lantus 24 at bedtime; Humalog 10 premeal Resolved  FS 130s-200s, well controlled, will continue to monitor with current regimen    Initially occured likely in setting of poorly uncontrolled DM and non-compliance with diet. r/o infectious source. UA negative. CXR clear.   -AG closed  -Maintain K >4  -- c/w Lantus 24 at bedtime; Humalog 10 premeal  -pt seen by endo Resolved  Type II NSTEMI in the setting of DKA; Cardiac enzymes were elevated with HS trop 913, , 35.27. EKG w/ NSR and no LISETTE  -S/P Aspirin an Brilinta load   -c/w Asa  -C/w Plavix 75mg daily  -c/w lisinopril 5mg daily   -trops peaked, no longer trending  -c/w Atorvastatin  -no plan for cath at this time as per Cards - see above  - will need to continue with basal/bolus insulin as per endocrine, given risk of developing DKA (may have ketosis prone DM2 which can be seen in setting of anti-psychotic use).

## 2018-12-31 NOTE — PROGRESS NOTE ADULT - SUBJECTIVE AND OBJECTIVE BOX
Chief Complaint: DM2    History: patient denies symptoms but poor historian. He is asking when he will go home.  Per chart periods of agitation yesterday.    MEDICATIONS  (STANDING):  aspirin  chewable 81 milliGRAM(s) Oral daily  atorvastatin 80 milliGRAM(s) Oral at bedtime  benztropine 1 milliGRAM(s) Oral two times a day  clopidogrel Tablet 75 milliGRAM(s) Oral daily  dextrose 5%. 1000 milliLiter(s) (50 mL/Hr) IV Continuous <Continuous>  dextrose 50% Injectable 12.5 Gram(s) IV Push once  dextrose 50% Injectable 25 Gram(s) IV Push once  dextrose 50% Injectable 25 Gram(s) IV Push once  diVALproex  milliGRAM(s) Oral at bedtime  diVALproex  milliGRAM(s) Oral <User Schedule>  enoxaparin Injectable 40 milliGRAM(s) SubCutaneous every 24 hours  haloperidol     Tablet 20 milliGRAM(s) Oral at bedtime  insulin glargine Injectable (LANTUS) 24 Unit(s) SubCutaneous every morning  insulin lispro (HumaLOG) corrective regimen sliding scale   SubCutaneous at bedtime  insulin lispro (HumaLOG) corrective regimen sliding scale   SubCutaneous three times a day before meals  insulin lispro Injectable (HumaLOG) 10 Unit(s) SubCutaneous three times a day before meals  lisinopril 5 milliGRAM(s) Oral daily    MEDICATIONS  (PRN):  dextrose 40% Gel 15 Gram(s) Oral once PRN Blood Glucose LESS THAN 70 milliGRAM(s)/deciliter  glucagon  Injectable 1 milliGRAM(s) IntraMuscular once PRN Glucose LESS THAN 70 milligrams/deciliter  LORazepam   Injectable 2 milliGRAM(s) IV Push every 8 hours PRN Agitation  OLANZapine Injectable 5 milliGRAM(s) IntraMuscular every 6 hours PRN Severe Agitation      Allergies    No Known Allergies    Intolerances      Review of Systems:    UNABLE TO OBTAIN    PHYSICAL EXAM:  VITALS: T(C): 36.4 (12-31-18 @ 11:55)  T(F): 97.5 (12-31-18 @ 11:55), Max: 97.5 (12-31-18 @ 11:55)  HR: 90 (12-31-18 @ 11:55) (80 - 119)  BP: 96/62 (12-31-18 @ 11:55) (96/62 - 115/81)  RR:  (16 - 18)  SpO2:  (98% - 99%)  Wt(kg): --  GENERAL: NAD, well-groomed, well-developed  EYES: No proptosis, no lid lag, anicteric  HEENT:  Atraumatic, Normocephalic, moist mucous membranes  NEURO: extraocular movements intact, no tremor  PSYCH: awake, verbal      CAPILLARY BLOOD GLUCOSE      POCT Blood Glucose.: 273 mg/dL (31 Dec 2018 12:26)  POCT Blood Glucose.: 132 mg/dL (31 Dec 2018 08:59)  POCT Blood Glucose.: 199 mg/dL (30 Dec 2018 23:39)  POCT Blood Glucose.: 154 mg/dL (30 Dec 2018 17:30)      12-30    141  |  105  |  4<L>  ----------------------------<  149<H>  4.5   |  25  |  0.68    EGFR if : 152  EGFR if non : 131    Ca    9.2      12-30  Mg     1.7     12-30  Phos  4.1     12-30            Thyroid Function Tests:      Hemoglobin A1C, Whole Blood: 13.8 % <H> [4.0 - 5.6] (12-24-18 @ 07:15)

## 2018-12-31 NOTE — PROGRESS NOTE ADULT - PROBLEM SELECTOR PLAN 1
Patient admitted for DKA. patient MUST ALWAYS receive basal insulin to prevent return of DKA.   For now, continue with Lantus to 24 daily, patient gets the does every morning.   Will increase Humalog to 11/10/10  Continue low correction scale premeal and low bedtime scale.    -would advise that primary team be notified if pt refuses Lantus/insulin as he is otherwise at risk for DKA.    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    Patient will need basal bolus insulin at [place] (to be administered at group home).  He will need outpatient endocrine follow up [phone].

## 2018-12-31 NOTE — PROGRESS NOTE ADULT - PROBLEM SELECTOR PLAN 4
DVT ppx: Lovenox  Diet: Regular/Consistent Carb/DASH/TLC  Dispo: had long discussion with patient's mother and sister  on 12/27 (at bedside) after the incident of aggression. They stated that patient behaves like this at home quite frequently, and that it is difficult for them to restrain and keep him calm at home. Patient also refuses to take medications. Pt came in with DKA due to poorly controlled DM; endocrinology has been involved in his care and stated that the patient will need insulin for adequate control. Patient's family stated that they will not be able to give him insulin at home as they had tried 1 year prior, without success. Family was in agreement with placing patient in a  group home, to ensure adequate care. DVT ppx: Lovenox  Diet: Regular/Consistent Carb/DASH/TLC  Dispo: had long discussion with patient's mother and sister  on 12/27 (at bedside) after the incident of aggression. They stated that patient behaves like this at home quite frequently, and that it is difficult for them to restrain and keep him calm at home. Patient also refuses to take medications. Pt came in with DKA due to poorly controlled DM; endocrinology has been involved in his care and stated that the patient will need insulin for adequate control. Patient's family stated that they will not be able to give him insulin at home as they had tried 1 year prior, without success. Family was in agreement with placing patient in a  group home, to ensure adequate care.  -will f/u w/  regarding placement into group home DVT ppx: Lovenox  Diet: Regular/Consistent Carb/DASH/TLC  Dispo: had long discussion with patient's mother and sister  on 12/27 (at bedside) after the incident of aggression. They stated that patient behaves like this at home quite frequently, and that it is difficult for them to restrain and keep him calm at home. Patient also refuses to take medications. Endocrinology has been involved in his care and stated that the patient will need insulin for adequate glycemic control. Patient's family stated that they will not be able to give him insulin at home as they had tried 1 year prior, without success. Family was in agreement with placing patient in a  group home, to ensure adequate care.  -will f/u w/  regarding placement into group home Resolved  Type II NSTEMI in the setting of DKA; Cardiac enzymes were elevated with HS trop 913, , 35.27. EKG w/ NSR and no LISETTE  -S/P Aspirin an Brilinta load   -c/w Asa  -C/w Plavix 75mg daily  -c/w lisinopril 5mg daily   -trops peaked, no longer trending  -c/w Atorvastatin  -no plan for cath at this time as per Cards

## 2018-12-31 NOTE — PROGRESS NOTE ADULT - ASSESSMENT
28 year old man with schizophrenia, DM 2 uncontrolled, admitted with DKA. HbA1c 13.8%, also found to have NSTEMI, medically managed.  pt and family unable to give insulin at home, dc planning ongoing with plan for group home.

## 2018-12-31 NOTE — PROGRESS NOTE ADULT - PROBLEM SELECTOR PROBLEM 3
DKA (diabetic ketoacidoses) Chest pain Uncontrolled type 2 diabetes mellitus, without long-term current use of insulin

## 2018-12-31 NOTE — PROGRESS NOTE ADULT - SUBJECTIVE AND OBJECTIVE BOX
Patient is a 28y old  Male who presents with a chief complaint of Chest Pain, DKA (30 Dec 2018 06:53)      SUBJECTIVE / OVERNIGHT EVENTS:  Pt is resting comfortably in bed.  He doesn't respond to questions pertaining to ROS or overnight events but did agree to be examined. As per nurse, overnight pt not agitated and sleeping well.       MEDICATIONS  (STANDING):  aspirin  chewable 81 milliGRAM(s) Oral daily  atorvastatin 80 milliGRAM(s) Oral at bedtime  benztropine 1 milliGRAM(s) Oral two times a day  clopidogrel Tablet 75 milliGRAM(s) Oral daily  dextrose 5%. 1000 milliLiter(s) (50 mL/Hr) IV Continuous <Continuous>  dextrose 50% Injectable 12.5 Gram(s) IV Push once  dextrose 50% Injectable 25 Gram(s) IV Push once  dextrose 50% Injectable 25 Gram(s) IV Push once  diVALproex  milliGRAM(s) Oral at bedtime  diVALproex  milliGRAM(s) Oral <User Schedule>  enoxaparin Injectable 40 milliGRAM(s) SubCutaneous every 24 hours  haloperidol     Tablet 20 milliGRAM(s) Oral at bedtime  insulin glargine Injectable (LANTUS) 24 Unit(s) SubCutaneous every morning  insulin lispro (HumaLOG) corrective regimen sliding scale   SubCutaneous at bedtime  insulin lispro (HumaLOG) corrective regimen sliding scale   SubCutaneous three times a day before meals  insulin lispro Injectable (HumaLOG) 10 Unit(s) SubCutaneous three times a day before meals  lisinopril 5 milliGRAM(s) Oral daily    MEDICATIONS  (PRN):  dextrose 40% Gel 15 Gram(s) Oral once PRN Blood Glucose LESS THAN 70 milliGRAM(s)/deciliter  glucagon  Injectable 1 milliGRAM(s) IntraMuscular once PRN Glucose LESS THAN 70 milligrams/deciliter  LORazepam   Injectable 2 milliGRAM(s) IV Push every 6 hours PRN Agitation  OLANZapine Injectable 5 milliGRAM(s) IntraMuscular every 6 hours PRN Severe Agitation      Vital Signs Last 24 Hrs  T(C): 36.3 (31 Dec 2018 06:11), Max: 36.3 (30 Dec 2018 13:16)  T(F): 97.4 (31 Dec 2018 06:11), Max: 97.4 (31 Dec 2018 06:11)  HR: 80 (31 Dec 2018 06:11) (80 - 119)  BP: 101/68 (31 Dec 2018 06:11) (101/68 - 115/81)  BP(mean): --  RR: 16 (30 Dec 2018 21:10) (16 - 18)  SpO2: 99% (31 Dec 2018 06:11) (98% - 99%)    CAPILLARY BLOOD GLUCOSE      POCT Blood Glucose.: 132 mg/dL (31 Dec 2018 08:59)  POCT Blood Glucose.: 199 mg/dL (30 Dec 2018 23:39)  POCT Blood Glucose.: 154 mg/dL (30 Dec 2018 17:30)  POCT Blood Glucose.: 218 mg/dL (30 Dec 2018 12:51)    I&O's Summary      PHYSICAL EXAM:  General: NAD	  Cardiovascularregular rhythm, Normal S1 S2, No JVD, No murmurs appreciated  Respiratory: Lungs clear to auscultation	  Gastrointestinal:  Soft, Non-tender, + BS	  Skin: No rashes, No ecchymoses, No cyanosis	  Extremities: Normal range of motion, No clubbing, cyanosis or edema  Vascular: Peripheral pulses palpable 2+ bilaterally      LABS:    12-30    141  |  105  |  4<L>  ----------------------------<  149<H>  4.5   |  25  |  0.68    Ca    9.2      30 Dec 2018 06:45  Mg     1.7     12-30  Phos  4.1     12-30                RESPIRATORY  VENT:    ABG:     VBG:     RADIOLOGY & ADDITIONAL TESTS:  (Imaging Personally Reviewed)NA    Consultant(s) Notes Reviewed:  NA    Care Discussed with Consultants/Other Providers:LY Patient is a 28y old  Male who presents with a chief complaint of Chest Pain, DKA (30 Dec 2018 06:53)      SUBJECTIVE / OVERNIGHT EVENTS:  Pt is resting comfortably in bed.  He doesn't respond to questions pertaining to ROS or overnight events but did agree to be examined. As per nurse, overnight pt not agitated and sleeping well.       MEDICATIONS  (STANDING):  aspirin  chewable 81 milliGRAM(s) Oral daily  atorvastatin 80 milliGRAM(s) Oral at bedtime  benztropine 1 milliGRAM(s) Oral two times a day  clopidogrel Tablet 75 milliGRAM(s) Oral daily  dextrose 5%. 1000 milliLiter(s) (50 mL/Hr) IV Continuous <Continuous>  dextrose 50% Injectable 12.5 Gram(s) IV Push once  dextrose 50% Injectable 25 Gram(s) IV Push once  dextrose 50% Injectable 25 Gram(s) IV Push once  diVALproex  milliGRAM(s) Oral at bedtime  diVALproex  milliGRAM(s) Oral <User Schedule>  enoxaparin Injectable 40 milliGRAM(s) SubCutaneous every 24 hours  haloperidol     Tablet 20 milliGRAM(s) Oral at bedtime  insulin glargine Injectable (LANTUS) 24 Unit(s) SubCutaneous every morning  insulin lispro (HumaLOG) corrective regimen sliding scale   SubCutaneous at bedtime  insulin lispro (HumaLOG) corrective regimen sliding scale   SubCutaneous three times a day before meals  insulin lispro Injectable (HumaLOG) 10 Unit(s) SubCutaneous three times a day before meals  lisinopril 5 milliGRAM(s) Oral daily    MEDICATIONS  (PRN):  dextrose 40% Gel 15 Gram(s) Oral once PRN Blood Glucose LESS THAN 70 milliGRAM(s)/deciliter  glucagon  Injectable 1 milliGRAM(s) IntraMuscular once PRN Glucose LESS THAN 70 milligrams/deciliter  LORazepam   Injectable 2 milliGRAM(s) IV Push every 6 hours PRN Agitation  OLANZapine Injectable 5 milliGRAM(s) IntraMuscular every 6 hours PRN Severe Agitation      Vital Signs Last 24 Hrs  T(C): 36.3 (31 Dec 2018 06:11), Max: 36.3 (30 Dec 2018 13:16)  T(F): 97.4 (31 Dec 2018 06:11), Max: 97.4 (31 Dec 2018 06:11)  HR: 80 (31 Dec 2018 06:11) (80 - 119)  BP: 101/68 (31 Dec 2018 06:11) (101/68 - 115/81)  BP(mean): --  RR: 16 (30 Dec 2018 21:10) (16 - 18)  SpO2: 99% (31 Dec 2018 06:11) (98% - 99%)    CAPILLARY BLOOD GLUCOSE      POCT Blood Glucose.: 132 mg/dL (31 Dec 2018 08:59)  POCT Blood Glucose.: 199 mg/dL (30 Dec 2018 23:39)  POCT Blood Glucose.: 154 mg/dL (30 Dec 2018 17:30)  POCT Blood Glucose.: 218 mg/dL (30 Dec 2018 12:51)    I&O's Summary      PHYSICAL EXAM:  General: NAD	  Cardiovascular: regular rhythm, Normal S1 S2, No JVD, No murmurs appreciated  Respiratory: Lungs clear to auscultation	  Gastrointestinal:  Soft, Non-tender, + BS	  Skin: No rashes, No ecchymoses, No cyanosis	  Extremities: Normal range of motion, No clubbing, cyanosis or edema  Vascular: Peripheral pulses palpable 2+ bilaterally      LABS:    12-30    141  |  105  |  4<L>  ----------------------------<  149<H>  4.5   |  25  |  0.68    Ca    9.2      30 Dec 2018 06:45  Mg     1.7     12-30  Phos  4.1     12-30                RESPIRATORY  VENT:    ABG:     VBG:     RADIOLOGY & ADDITIONAL TESTS:  (Imaging Personally Reviewed)NA    Consultant(s) Notes Reviewed:  NA    Care Discussed with Consultants/Other Providers:LY Patient is a 28y old  Male who presents with a chief complaint of Chest Pain, DKA (30 Dec 2018 06:53)      SUBJECTIVE / OVERNIGHT EVENTS:  Pt is resting comfortably in bed.  He doesn't respond to questions pertaining to ROS or overnight events but did agree to be examined. As per nurse, overnight pt not agitated and sleeping well.       MEDICATIONS  (STANDING):  aspirin  chewable 81 milliGRAM(s) Oral daily  atorvastatin 80 milliGRAM(s) Oral at bedtime  benztropine 1 milliGRAM(s) Oral two times a day  clopidogrel Tablet 75 milliGRAM(s) Oral daily  dextrose 5%. 1000 milliLiter(s) (50 mL/Hr) IV Continuous <Continuous>  dextrose 50% Injectable 12.5 Gram(s) IV Push once  dextrose 50% Injectable 25 Gram(s) IV Push once  dextrose 50% Injectable 25 Gram(s) IV Push once  diVALproex  milliGRAM(s) Oral at bedtime  diVALproex  milliGRAM(s) Oral <User Schedule>  enoxaparin Injectable 40 milliGRAM(s) SubCutaneous every 24 hours  haloperidol     Tablet 20 milliGRAM(s) Oral at bedtime  insulin glargine Injectable (LANTUS) 24 Unit(s) SubCutaneous every morning  insulin lispro (HumaLOG) corrective regimen sliding scale   SubCutaneous at bedtime  insulin lispro (HumaLOG) corrective regimen sliding scale   SubCutaneous three times a day before meals  insulin lispro Injectable (HumaLOG) 10 Unit(s) SubCutaneous three times a day before meals  lisinopril 5 milliGRAM(s) Oral daily    MEDICATIONS  (PRN):  dextrose 40% Gel 15 Gram(s) Oral once PRN Blood Glucose LESS THAN 70 milliGRAM(s)/deciliter  glucagon  Injectable 1 milliGRAM(s) IntraMuscular once PRN Glucose LESS THAN 70 milligrams/deciliter  LORazepam   Injectable 2 milliGRAM(s) IV Push every 6 hours PRN Agitation  OLANZapine Injectable 5 milliGRAM(s) IntraMuscular every 6 hours PRN Severe Agitation      Vital Signs Last 24 Hrs  T(C): 36.3 (31 Dec 2018 06:11), Max: 36.3 (30 Dec 2018 13:16)  T(F): 97.4 (31 Dec 2018 06:11), Max: 97.4 (31 Dec 2018 06:11)  HR: 80 (31 Dec 2018 06:11) (80 - 119)  BP: 101/68 (31 Dec 2018 06:11) (101/68 - 115/81)  BP(mean): --  RR: 16 (30 Dec 2018 21:10) (16 - 18)  SpO2: 99% (31 Dec 2018 06:11) (98% - 99%)    CAPILLARY BLOOD GLUCOSE      POCT Blood Glucose.: 132 mg/dL (31 Dec 2018 08:59)  POCT Blood Glucose.: 199 mg/dL (30 Dec 2018 23:39)  POCT Blood Glucose.: 154 mg/dL (30 Dec 2018 17:30)  POCT Blood Glucose.: 218 mg/dL (30 Dec 2018 12:51)    I&O's Summary      PHYSICAL EXAM:  General: NAD	  Cardiovascular: regular rhythm, Normal S1 S2, No JVD, No murmurs appreciated  Respiratory: Lungs clear to auscultation	  Gastrointestinal:  Soft, Non-tender, + BS	  Skin: No rashes, No ecchymoses, No cyanosis	  Extremities: Normal range of motion, No clubbing, cyanosis or edema          LABS:    12-30    141  |  105  |  4<L>  ----------------------------<  149<H>  4.5   |  25  |  0.68    Ca    9.2      30 Dec 2018 06:45  Mg     1.7     12-30  Phos  4.1     12-30                RESPIRATORY  VENT:    ABG:     VBG:     RADIOLOGY & ADDITIONAL TESTS:  (Imaging Personally Reviewed)NA    Consultant(s) Notes Reviewed:  NA    Care Discussed with Consultants/Other Providers:LY Patient is a 28y old  Male who presents with a chief complaint of Chest Pain, DKA (30 Dec 2018 06:53)      SUBJECTIVE / OVERNIGHT EVENTS:  Pt is resting comfortably in bed.  He didn't respond to questions pertaining to ROS or overnight events but did agree to be examined. As per nurse, overnight pt not agitated and sleeping well.       MEDICATIONS  (STANDING):  aspirin  chewable 81 milliGRAM(s) Oral daily  atorvastatin 80 milliGRAM(s) Oral at bedtime  benztropine 1 milliGRAM(s) Oral two times a day  clopidogrel Tablet 75 milliGRAM(s) Oral daily  dextrose 5%. 1000 milliLiter(s) (50 mL/Hr) IV Continuous <Continuous>  dextrose 50% Injectable 12.5 Gram(s) IV Push once  dextrose 50% Injectable 25 Gram(s) IV Push once  dextrose 50% Injectable 25 Gram(s) IV Push once  diVALproex  milliGRAM(s) Oral at bedtime  diVALproex  milliGRAM(s) Oral <User Schedule>  enoxaparin Injectable 40 milliGRAM(s) SubCutaneous every 24 hours  haloperidol     Tablet 20 milliGRAM(s) Oral at bedtime  insulin glargine Injectable (LANTUS) 24 Unit(s) SubCutaneous every morning  insulin lispro (HumaLOG) corrective regimen sliding scale   SubCutaneous at bedtime  insulin lispro (HumaLOG) corrective regimen sliding scale   SubCutaneous three times a day before meals  insulin lispro Injectable (HumaLOG) 10 Unit(s) SubCutaneous three times a day before meals  lisinopril 5 milliGRAM(s) Oral daily    MEDICATIONS  (PRN):  dextrose 40% Gel 15 Gram(s) Oral once PRN Blood Glucose LESS THAN 70 milliGRAM(s)/deciliter  glucagon  Injectable 1 milliGRAM(s) IntraMuscular once PRN Glucose LESS THAN 70 milligrams/deciliter  LORazepam   Injectable 2 milliGRAM(s) IV Push every 6 hours PRN Agitation  OLANZapine Injectable 5 milliGRAM(s) IntraMuscular every 6 hours PRN Severe Agitation      Vital Signs Last 24 Hrs  T(C): 36.3 (31 Dec 2018 06:11), Max: 36.3 (30 Dec 2018 13:16)  T(F): 97.4 (31 Dec 2018 06:11), Max: 97.4 (31 Dec 2018 06:11)  HR: 80 (31 Dec 2018 06:11) (80 - 119)  BP: 101/68 (31 Dec 2018 06:11) (101/68 - 115/81)  BP(mean): --  RR: 16 (30 Dec 2018 21:10) (16 - 18)  SpO2: 99% (31 Dec 2018 06:11) (98% - 99%)    CAPILLARY BLOOD GLUCOSE      POCT Blood Glucose.: 132 mg/dL (31 Dec 2018 08:59)  POCT Blood Glucose.: 199 mg/dL (30 Dec 2018 23:39)  POCT Blood Glucose.: 154 mg/dL (30 Dec 2018 17:30)  POCT Blood Glucose.: 218 mg/dL (30 Dec 2018 12:51)    I&O's Summary      PHYSICAL EXAM:  General: NAD	  Cardiovascular: regular rhythm, Normal S1 S2, No JVD, No murmurs appreciated  Respiratory: Lungs clear to auscultation	  Gastrointestinal:  Soft, Non-tender, + BS	  Skin: No rashes, No ecchymoses, No cyanosis	  Extremities: Normal range of motion, No clubbing, cyanosis or edema          LABS:    12-30    141  |  105  |  4<L>  ----------------------------<  149<H>  4.5   |  25  |  0.68    Ca    9.2      30 Dec 2018 06:45  Mg     1.7     12-30  Phos  4.1     12-30                RESPIRATORY  VENT:    ABG:     VBG:     RADIOLOGY & ADDITIONAL TESTS:  (Imaging Personally Reviewed)NA    Consultant(s) Notes Reviewed:  NA    Care Discussed with Consultants/Other Providers:LY Patient is a 28y old  Male who presents with a chief complaint of Chest Pain, DKA (30 Dec 2018 06:53)      SUBJECTIVE / OVERNIGHT EVENTS:  Pt is resting comfortably in bed.  He didn't respond to questions pertaining to ROS or overnight events but did agree to be examined. As per nurse, overnight pt not agitated and sleeping well.       MEDICATIONS  (STANDING):  aspirin  chewable 81 milliGRAM(s) Oral daily  atorvastatin 80 milliGRAM(s) Oral at bedtime  benztropine 1 milliGRAM(s) Oral two times a day  clopidogrel Tablet 75 milliGRAM(s) Oral daily  dextrose 5%. 1000 milliLiter(s) (50 mL/Hr) IV Continuous <Continuous>  dextrose 50% Injectable 12.5 Gram(s) IV Push once  dextrose 50% Injectable 25 Gram(s) IV Push once  dextrose 50% Injectable 25 Gram(s) IV Push once  diVALproex  milliGRAM(s) Oral at bedtime  diVALproex  milliGRAM(s) Oral <User Schedule>  enoxaparin Injectable 40 milliGRAM(s) SubCutaneous every 24 hours  haloperidol     Tablet 20 milliGRAM(s) Oral at bedtime  insulin glargine Injectable (LANTUS) 24 Unit(s) SubCutaneous every morning  insulin lispro (HumaLOG) corrective regimen sliding scale   SubCutaneous at bedtime  insulin lispro (HumaLOG) corrective regimen sliding scale   SubCutaneous three times a day before meals  insulin lispro Injectable (HumaLOG) 10 Unit(s) SubCutaneous three times a day before meals  lisinopril 5 milliGRAM(s) Oral daily    MEDICATIONS  (PRN):  dextrose 40% Gel 15 Gram(s) Oral once PRN Blood Glucose LESS THAN 70 milliGRAM(s)/deciliter  glucagon  Injectable 1 milliGRAM(s) IntraMuscular once PRN Glucose LESS THAN 70 milligrams/deciliter  LORazepam   Injectable 2 milliGRAM(s) IV Push every 6 hours PRN Agitation  OLANZapine Injectable 5 milliGRAM(s) IntraMuscular every 6 hours PRN Severe Agitation      Vital Signs Last 24 Hrs  T(C): 36.3 (31 Dec 2018 06:11), Max: 36.3 (30 Dec 2018 13:16)  T(F): 97.4 (31 Dec 2018 06:11), Max: 97.4 (31 Dec 2018 06:11)  HR: 80 (31 Dec 2018 06:11) (80 - 119)  BP: 101/68 (31 Dec 2018 06:11) (101/68 - 115/81)  BP(mean): --  RR: 16 (30 Dec 2018 21:10) (16 - 18)  SpO2: 99% (31 Dec 2018 06:11) (98% - 99%)    CAPILLARY BLOOD GLUCOSE      POCT Blood Glucose.: 132 mg/dL (31 Dec 2018 08:59)  POCT Blood Glucose.: 199 mg/dL (30 Dec 2018 23:39)  POCT Blood Glucose.: 154 mg/dL (30 Dec 2018 17:30)  POCT Blood Glucose.: 218 mg/dL (30 Dec 2018 12:51)    I&O's Summary      PHYSICAL EXAM:  General: NAD lying in bed, doesn't respond to most questions  Cardiovascular: regular rhythm, Normal S1 S2, No JVD, No murmurs appreciated  Respiratory: Lungs clear to auscultation	  Gastrointestinal:  Soft, Non-tender, + BS	  Skin: No rashes, No ecchymoses, No cyanosis	  Extremities: Normal range of motion, No clubbing, cyanosis or edema          LABS:    12-30    141  |  105  |  4<L>  ----------------------------<  149<H>  4.5   |  25  |  0.68    Ca    9.2      30 Dec 2018 06:45  Mg     1.7     12-30  Phos  4.1     12-30                RESPIRATORY  VENT:    ABG:     VBG:     RADIOLOGY & ADDITIONAL TESTS:  (Imaging Personally Reviewed)NA    Consultant(s) Notes Reviewed:  NA    Care Discussed with Consultants/Other Providers:LY Patient is a 28y old  Male who presents with a chief complaint of Chest Pain, DKA (30 Dec 2018 06:53)      SUBJECTIVE / OVERNIGHT EVENTS:  Pt is resting comfortably in bed.  He didn't respond to questions pertaining to ROS or overnight events but did agree to be examined. As per nurse, overnight pt not agitated and sleeping well.       MEDICATIONS  (STANDING):  aspirin  chewable 81 milliGRAM(s) Oral daily  atorvastatin 80 milliGRAM(s) Oral at bedtime  benztropine 1 milliGRAM(s) Oral two times a day  clopidogrel Tablet 75 milliGRAM(s) Oral daily  dextrose 5%. 1000 milliLiter(s) (50 mL/Hr) IV Continuous <Continuous>  dextrose 50% Injectable 12.5 Gram(s) IV Push once  dextrose 50% Injectable 25 Gram(s) IV Push once  dextrose 50% Injectable 25 Gram(s) IV Push once  diVALproex  milliGRAM(s) Oral at bedtime  diVALproex  milliGRAM(s) Oral <User Schedule>  enoxaparin Injectable 40 milliGRAM(s) SubCutaneous every 24 hours  haloperidol     Tablet 20 milliGRAM(s) Oral at bedtime  insulin glargine Injectable (LANTUS) 24 Unit(s) SubCutaneous every morning  insulin lispro (HumaLOG) corrective regimen sliding scale   SubCutaneous at bedtime  insulin lispro (HumaLOG) corrective regimen sliding scale   SubCutaneous three times a day before meals  insulin lispro Injectable (HumaLOG) 10 Unit(s) SubCutaneous three times a day before meals  lisinopril 5 milliGRAM(s) Oral daily    MEDICATIONS  (PRN):  dextrose 40% Gel 15 Gram(s) Oral once PRN Blood Glucose LESS THAN 70 milliGRAM(s)/deciliter  glucagon  Injectable 1 milliGRAM(s) IntraMuscular once PRN Glucose LESS THAN 70 milligrams/deciliter  LORazepam   Injectable 2 milliGRAM(s) IV Push every 6 hours PRN Agitation  OLANZapine Injectable 5 milliGRAM(s) IntraMuscular every 6 hours PRN Severe Agitation      Vital Signs Last 24 Hrs  T(C): 36.3 (31 Dec 2018 06:11), Max: 36.3 (30 Dec 2018 13:16)  T(F): 97.4 (31 Dec 2018 06:11), Max: 97.4 (31 Dec 2018 06:11)  HR: 80 (31 Dec 2018 06:11) (80 - 119)  BP: 101/68 (31 Dec 2018 06:11) (101/68 - 115/81)  BP(mean): --  RR: 16 (30 Dec 2018 21:10) (16 - 18)  SpO2: 99% (31 Dec 2018 06:11) (98% - 99%)    CAPILLARY BLOOD GLUCOSE  POCT Blood Glucose.: 132 mg/dL (31 Dec 2018 08:59)  POCT Blood Glucose.: 199 mg/dL (30 Dec 2018 23:39)  POCT Blood Glucose.: 154 mg/dL (30 Dec 2018 17:30)  POCT Blood Glucose.: 218 mg/dL (30 Dec 2018 12:51)    I&O's Summary    PHYSICAL EXAM:  General: NAD lying in bed, doesn't respond to most questions  Cardiovascular: regular rhythm, Normal S1 S2, No JVD, No murmurs appreciated  Respiratory: Lungs clear to auscultation	  Gastrointestinal:  Soft, Non-tender, + BS	  Skin: No rashes, No ecchymoses, No cyanosis	  Extremities: Normal range of motion, No clubbing, cyanosis or edema    LABS:    12-30    141  |  105  |  4<L>  ----------------------------<  149<H>  4.5   |  25  |  0.68    Ca    9.2      30 Dec 2018 06:45  Mg     1.7     12-30  Phos  4.1     12-30    RADIOLOGY & ADDITIONAL TESTS:  (Imaging Personally Reviewed)NA    Consultant(s) Notes Reviewed:  Endocrine    Care Discussed with Consultants/Other Providers: NA

## 2019-01-01 LAB
CK MB BLD-MCNC: 0.2 — SIGNIFICANT CHANGE UP (ref 0–2.5)
CK MB BLD-MCNC: 1.67 NG/ML — SIGNIFICANT CHANGE UP (ref 1–6.6)
CK SERPL-CCNC: 819 U/L — HIGH (ref 30–200)
TROPONIN T, HIGH SENSITIVITY: 28 NG/L — SIGNIFICANT CHANGE UP (ref ?–14)

## 2019-01-01 PROCEDURE — 99233 SBSQ HOSP IP/OBS HIGH 50: CPT | Mod: GC

## 2019-01-01 PROCEDURE — 99231 SBSQ HOSP IP/OBS SF/LOW 25: CPT

## 2019-01-01 PROCEDURE — 99232 SBSQ HOSP IP/OBS MODERATE 35: CPT

## 2019-01-01 RX ORDER — INSULIN LISPRO 100/ML
5 VIAL (ML) SUBCUTANEOUS ONCE
Qty: 0 | Refills: 0 | Status: DISCONTINUED | OUTPATIENT
Start: 2019-01-01 | End: 2019-01-01

## 2019-01-01 RX ORDER — DIPHENHYDRAMINE HCL 50 MG
50 CAPSULE ORAL ONCE
Qty: 0 | Refills: 0 | Status: COMPLETED | OUTPATIENT
Start: 2019-01-01 | End: 2019-01-01

## 2019-01-01 RX ORDER — INSULIN LISPRO 100/ML
13 VIAL (ML) SUBCUTANEOUS
Qty: 0 | Refills: 0 | Status: DISCONTINUED | OUTPATIENT
Start: 2019-01-01 | End: 2019-01-03

## 2019-01-01 RX ADMIN — Medication 4: at 09:35

## 2019-01-01 RX ADMIN — Medication 1 MILLIGRAM(S): at 11:53

## 2019-01-01 RX ADMIN — Medication 11 UNIT(S): at 09:35

## 2019-01-01 RX ADMIN — ENOXAPARIN SODIUM 40 MILLIGRAM(S): 100 INJECTION SUBCUTANEOUS at 11:53

## 2019-01-01 RX ADMIN — Medication 6: at 13:36

## 2019-01-01 RX ADMIN — Medication 10 UNIT(S): at 13:37

## 2019-01-01 RX ADMIN — Medication 4: at 18:18

## 2019-01-01 RX ADMIN — HALOPERIDOL DECANOATE 20 MILLIGRAM(S): 100 INJECTION INTRAMUSCULAR at 23:00

## 2019-01-01 RX ADMIN — Medication 2 MILLIGRAM(S): at 00:43

## 2019-01-01 RX ADMIN — Medication 1 MILLIGRAM(S): at 23:00

## 2019-01-01 RX ADMIN — DIVALPROEX SODIUM 500 MILLIGRAM(S): 500 TABLET, DELAYED RELEASE ORAL at 11:52

## 2019-01-01 RX ADMIN — DIVALPROEX SODIUM 750 MILLIGRAM(S): 500 TABLET, DELAYED RELEASE ORAL at 23:00

## 2019-01-01 RX ADMIN — CLOPIDOGREL BISULFATE 75 MILLIGRAM(S): 75 TABLET, FILM COATED ORAL at 11:52

## 2019-01-01 RX ADMIN — HALOPERIDOL DECANOATE 20 MILLIGRAM(S): 100 INJECTION INTRAMUSCULAR at 00:43

## 2019-01-01 RX ADMIN — Medication 10 UNIT(S): at 18:16

## 2019-01-01 RX ADMIN — ATORVASTATIN CALCIUM 80 MILLIGRAM(S): 80 TABLET, FILM COATED ORAL at 23:00

## 2019-01-01 RX ADMIN — INSULIN GLARGINE 24 UNIT(S): 100 INJECTION, SOLUTION SUBCUTANEOUS at 09:34

## 2019-01-01 RX ADMIN — LISINOPRIL 5 MILLIGRAM(S): 2.5 TABLET ORAL at 06:38

## 2019-01-01 RX ADMIN — Medication 81 MILLIGRAM(S): at 11:53

## 2019-01-01 NOTE — PROGRESS NOTE ADULT - PROBLEM SELECTOR PLAN 1
Patient admitted for DKA. patient MUST ALWAYS receive basal insulin to prevent return of DKA.   For now, continue with Lantus to 24 daily, patient gets the does every morning.   Will increase Humalog to 13/10/10 breakfast/lunch/dinner.   Continue low correction scale premeal and low bedtime scale.    -would advise that primary team be notified if pt refuses Lantus/insulin as he is otherwise at risk for DKA.    In regards to type of DM, ILANNA and islet cell are negative - therefore patient appears to have ketosis prone DM2 which can be seen in the setting of antipsychotic use.    Patient will need basal bolus insulin at [place] (to be administered at group home).  He will need outpatient endocrine follow up [phone].

## 2019-01-01 NOTE — PROGRESS NOTE BEHAVIORAL HEALTH - SUMMARY
27 yo single never  man of [place] descent with no dependents, domiciled at home with parents who are his caregivers, unemployed, diagnosed with bipolar disorder, Intermittent Explosive Disorder, intellectual disability (IQ=40), multiple inpatient psychiatric admissions last to [facility] in Sept 29 2015 to Oct 7 2015, currently in [facility] with NP Nury Pabon; he has a long history of aggression toward family and chronic poor frustration tolerance; no prior suicide attempts, no legal problems, no substance use, no abuse history, has PMH of DM2, who has been admitted for chest pain. Psychiatry was consulted to assess his agitation. Patient is sleeping, arousable to verbal cues this morning but did not engage meaningfully in interview. No notable overnight events per staff.

## 2019-01-01 NOTE — PROGRESS NOTE ADULT - PROBLEM SELECTOR PLAN 1
Pt agitated overnight requiring haldol and benadryl w/ restraints  CK elevated 819 secondary to agitation while in restraints, will continue to trend and f/u CMP to assess for ACE  -c/w home meds  ---Haldol 20mg qhs; Depakote 500 in the AM and 750 at night  -F/U psych recs  -----Haldol 5mg PRN  -----Zyprexa 5mg PRN  -----Ativan 2mg PRN

## 2019-01-01 NOTE — PROGRESS NOTE BEHAVIORAL HEALTH - NSBHCONSULTOBSREASON_PSY_A_CORE FT
intellectual disability- needs supervision
intellectual disability- needs supervision
severe intellectual disability
intellectually disabled- needs supervision

## 2019-01-01 NOTE — PROGRESS NOTE ADULT - SUBJECTIVE AND OBJECTIVE BOX
Patient is a 28y old  Male who presents with a chief complaint of Chest Pain, DKA (31 Dec 2018 13:25)      SUBJECTIVE / OVERNIGHT EVENTS:  Pt agitated overnight requiring haldol 5 mg IV x2 and benadryl. Pt calm in the morning and allowed himself to be examined, but did not respond to most questions.     MEDICATIONS  (STANDING):  aspirin  chewable 81 milliGRAM(s) Oral daily  atorvastatin 80 milliGRAM(s) Oral at bedtime  benztropine 1 milliGRAM(s) Oral two times a day  clopidogrel Tablet 75 milliGRAM(s) Oral daily  dextrose 5%. 1000 milliLiter(s) (50 mL/Hr) IV Continuous <Continuous>  dextrose 50% Injectable 12.5 Gram(s) IV Push once  dextrose 50% Injectable 25 Gram(s) IV Push once  dextrose 50% Injectable 25 Gram(s) IV Push once  diVALproex  milliGRAM(s) Oral at bedtime  diVALproex  milliGRAM(s) Oral <User Schedule>  enoxaparin Injectable 40 milliGRAM(s) SubCutaneous every 24 hours  haloperidol     Tablet 20 milliGRAM(s) Oral at bedtime  insulin glargine Injectable (LANTUS) 24 Unit(s) SubCutaneous every morning  insulin lispro (HumaLOG) corrective regimen sliding scale   SubCutaneous at bedtime  insulin lispro (HumaLOG) corrective regimen sliding scale   SubCutaneous three times a day before meals  insulin lispro Injectable (HumaLOG) 11 Unit(s) SubCutaneous before breakfast  insulin lispro Injectable (HumaLOG) 10 Unit(s) SubCutaneous before dinner  insulin lispro Injectable (HumaLOG) 10 Unit(s) SubCutaneous before lunch  lisinopril 5 milliGRAM(s) Oral daily    MEDICATIONS  (PRN):  dextrose 40% Gel 15 Gram(s) Oral once PRN Blood Glucose LESS THAN 70 milliGRAM(s)/deciliter  glucagon  Injectable 1 milliGRAM(s) IntraMuscular once PRN Glucose LESS THAN 70 milligrams/deciliter  LORazepam   Injectable 2 milliGRAM(s) IV Push every 8 hours PRN Agitation  OLANZapine Injectable 5 milliGRAM(s) IntraMuscular every 6 hours PRN Severe Agitation      Vital Signs Last 24 Hrs  T(C): 36.4 (01 Jan 2019 06:34), Max: 36.8 (01 Jan 2019 06:07)  T(F): 97.6 (01 Jan 2019 06:34), Max: 98.3 (01 Jan 2019 06:07)  HR: 97 (01 Jan 2019 06:34) (90 - 114)  BP: 120/83 (01 Jan 2019 06:34) (96/62 - 134/81)  BP(mean): --  RR: 16 (01 Jan 2019 06:34) (16 - 18)  SpO2: 99% (01 Jan 2019 06:34) (97% - 99%)    CAPILLARY BLOOD GLUCOSE      POCT Blood Glucose.: 186 mg/dL (31 Dec 2018 22:12)  POCT Blood Glucose.: 284 mg/dL (31 Dec 2018 17:38)  POCT Blood Glucose.: 273 mg/dL (31 Dec 2018 12:26)    I&O's Summary      PHYSICAL EXAM:  General: NAD lying in bed, doesn't respond to most questions  Cardiovascular: regular rhythm, Normal S1 S2, No JVD, No murmurs appreciated  Respiratory: Lungs clear to auscultation	  Gastrointestinal:  Soft, Non-tender, + BS	  Skin: No rashes, No ecchymoses, No cyanosis	  Extremities: Normal range of motion, No clubbing, cyanosis or edema  LABS:          CARDIAC MARKERS ( 31 Dec 2018 23:30 )  x     / x     / 819 u/L / 1.67 ng/mL / x                RESPIRATORY  VENT:    ABG:     VBG:     RADIOLOGY & ADDITIONAL TESTS:  (Imaging Personally Reviewed): LY    Consultant(s) Notes Reviewed:  LY    Care Discussed with Consultants/Other Providers: LY Patient is a 28y old  Male who presents with a chief complaint of Chest Pain, DKA (31 Dec 2018 13:25)    SUBJECTIVE / OVERNIGHT EVENTS:  Pt agitated overnight requiring haldol 5 mg IV x2 and benadryl. Pt calm in the morning and allowed himself to be examined, but did not respond to most questions.     MEDICATIONS  (STANDING):  aspirin  chewable 81 milliGRAM(s) Oral daily  atorvastatin 80 milliGRAM(s) Oral at bedtime  benztropine 1 milliGRAM(s) Oral two times a day  clopidogrel Tablet 75 milliGRAM(s) Oral daily  diVALproex  milliGRAM(s) Oral at bedtime  diVALproex  milliGRAM(s) Oral <User Schedule>  enoxaparin Injectable 40 milliGRAM(s) SubCutaneous every 24 hours  haloperidol     Tablet 20 milliGRAM(s) Oral at bedtime  insulin glargine Injectable (LANTUS) 24 Unit(s) SubCutaneous every morning  insulin lispro (HumaLOG) corrective regimen sliding scale   SubCutaneous at bedtime  insulin lispro (HumaLOG) corrective regimen sliding scale   SubCutaneous three times a day before meals  insulin lispro Injectable (HumaLOG) 11 Unit(s) SubCutaneous before breakfast  insulin lispro Injectable (HumaLOG) 10 Unit(s) SubCutaneous before dinner  insulin lispro Injectable (HumaLOG) 10 Unit(s) SubCutaneous before lunch  lisinopril 5 milliGRAM(s) Oral daily    MEDICATIONS  (PRN):  dextrose 40% Gel 15 Gram(s) Oral once PRN Blood Glucose LESS THAN 70 milliGRAM(s)/deciliter  glucagon  Injectable 1 milliGRAM(s) IntraMuscular once PRN Glucose LESS THAN 70 milligrams/deciliter  LORazepam   Injectable 2 milliGRAM(s) IV Push every 8 hours PRN Agitation  OLANZapine Injectable 5 milliGRAM(s) IntraMuscular every 6 hours PRN Severe Agitation    Vital Signs Last 24 Hrs  T(C): 36.4 (01 Jan 2019 06:34), Max: 36.8 (01 Jan 2019 06:07)  T(F): 97.6 (01 Jan 2019 06:34), Max: 98.3 (01 Jan 2019 06:07)  HR: 97 (01 Jan 2019 06:34) (90 - 114)  BP: 120/83 (01 Jan 2019 06:34) (96/62 - 134/81)  RR: 16 (01 Jan 2019 06:34) (16 - 18)  SpO2: 99% (01 Jan 2019 06:34) (97% - 99%)    CAPILLARY BLOOD GLUCOSE  POCT Blood Glucose.: 186 mg/dL (31 Dec 2018 22:12)  POCT Blood Glucose.: 284 mg/dL (31 Dec 2018 17:38)  POCT Blood Glucose.: 273 mg/dL (31 Dec 2018 12:26)    PHYSICAL EXAM:  General: NAD lying in bed, doesn't respond to most questions  Cardiovascular: regular rhythm, Normal S1 S2, No JVD, No murmurs appreciated  Respiratory: Lungs clear to auscultation	  Gastrointestinal:  Soft, Non-tender, + BS	  Skin: No rashes, No ecchymoses, No cyanosis	  Extremities: Normal range of motion, No clubbing, cyanosis or edema  LABS:      CARDIAC MARKERS ( 31 Dec 2018 23:30 )  x     / x     / 819 u/L / 1.67 ng/mL / x        RADIOLOGY & ADDITIONAL TESTS:  (Imaging Personally Reviewed): NA  Consultant(s) Notes Reviewed:  psych  Care Discussed with Consultants/Other Providers: LY

## 2019-01-01 NOTE — PROGRESS NOTE ADULT - PROBLEM SELECTOR PLAN 3
- see above  - will need to continue with basal/bolus insulin as per endocrine, given risk of developing DKA (may have ketosis prone DM2 which can be seen in setting of anti-psychotic use).

## 2019-01-01 NOTE — PROGRESS NOTE ADULT - PROBLEM SELECTOR PLAN 2
- Initially occurred likely in setting of poorly uncontrolled DM and non-compliance with diet. no clear infectious source. UA negative. CXR clear.   - Resolved, AG closed  - FS now 130s-200s, better controlled, will continue to monitor with current regimen  - appreciate endocrine recs  - c/w Lantus 24U at bedtime; premeal Humalog 11/10/10, HISS.

## 2019-01-01 NOTE — PROGRESS NOTE ADULT - SUBJECTIVE AND OBJECTIVE BOX
Chief Complaint: DM2    History: Patient standing up.    One:one states that patient had cereal and milk for breakfast.   He had just had a bowel movement in the chair.      MEDICATIONS  (STANDING):  aspirin  chewable 81 milliGRAM(s) Oral daily  atorvastatin 80 milliGRAM(s) Oral at bedtime  benztropine 1 milliGRAM(s) Oral two times a day  clopidogrel Tablet 75 milliGRAM(s) Oral daily  dextrose 5%. 1000 milliLiter(s) (50 mL/Hr) IV Continuous <Continuous>  diVALproex  milliGRAM(s) Oral at bedtime  diVALproex  milliGRAM(s) Oral <User Schedule>  enoxaparin Injectable 40 milliGRAM(s) SubCutaneous every 24 hours  haloperidol     Tablet 20 milliGRAM(s) Oral at bedtime  insulin glargine Injectable (LANTUS) 24 Unit(s) SubCutaneous every morning  insulin lispro (HumaLOG) corrective regimen sliding scale   SubCutaneous at bedtime  insulin lispro (HumaLOG) corrective regimen sliding scale   SubCutaneous three times a day before meals  insulin lispro Injectable (HumaLOG) 11 Unit(s) SubCutaneous before breakfast  insulin lispro Injectable (HumaLOG) 10 Unit(s) SubCutaneous before dinner  insulin lispro Injectable (HumaLOG) 10 Unit(s) SubCutaneous before lunch  lisinopril 5 milliGRAM(s) Oral daily      MEDICATIONS  (PRN):  dextrose 40% Gel 15 Gram(s) Oral once PRN Blood Glucose LESS THAN 70 milliGRAM(s)/deciliter  glucagon  Injectable 1 milliGRAM(s) IntraMuscular once PRN Glucose LESS THAN 70 milligrams/deciliter  LORazepam   Injectable 2 milliGRAM(s) IV Push every 8 hours PRN Agitation  OLANZapine Injectable 5 milliGRAM(s) IntraMuscular every 6 hours PRN Severe Agitation      Allergies    No Known Allergies    Intolerances      Review of Systems:  UNABLE TO OBTAIN    PHYSICAL EXAM:  T(C): 36.4 (01-01-19 @ 11:42)  T(F): 97.5 (01-01-19 @ 11:42), Max: 98.3 (01-01-19 @ 06:07)  HR: 111 (01-01-19 @ 11:42) (95 - 114)  BP: 166/109 (01-01-19 @ 11:42) (119/77 - 166/109)  RR:  (16 - 18)  SpO2:  (97% - 99%)  Wt(kg): --  GENERAL: NAD, well-groomed, well-developed  EYES: No proptosis, no lid lag, anicteric  HEENT:  Atraumatic, Normocephalic, moist mucous membranes  NEURO: extraocular movements intact, no tremor  PSYCH: awake, verbal      CAPILLARY BLOOD GLUCOSE  POCT Blood Glucose.: 430 mg/dL (01-01-19 @ 13:35)  POCT Blood Glucose.: 186 mg/dL (12-31-18 @ 22:12)  POCT Blood Glucose.: 284 mg/dL (12-31-18 @ 17:38)  POCT Blood Glucose.: 273 mg/dL (12-31-18 @ 12:26)  POCT Blood Glucose.: 132 mg/dL (12-31-18 @ 08:59)  POCT Blood Glucose.: 199 mg/dL (12-30-18 @ 23:39)  POCT Blood Glucose.: 154 mg/dL (12-30-18 @ 17:30)  POCT Blood Glucose.: 218 mg/dL (12-30-18 @ 12:51)  POCT Blood Glucose.: 165 mg/dL (12-30-18 @ 09:01)  POCT Blood Glucose.: 170 mg/dL (12-29-18 @ 22:06)  POCT Blood Glucose.: 115 mg/dL (12-29-18 @ 17:52)    12-30    141  |  105  |  4<L>  ----------------------------<  149<H>  4.5   |  25  |  0.68    EGFR if : 152  EGFR if non : 131    Ca    9.2      12-30  Mg     1.7     12-30  Phos  4.1     12-30            Thyroid Function Tests:      Hemoglobin A1C, Whole Blood: 13.8 % <H> [4.0 - 5.6] (12-24-18 @ 07:15)

## 2019-01-01 NOTE — PROGRESS NOTE ADULT - PROBLEM SELECTOR PLAN 4
Resolved  Type II NSTEMI in the setting of DKA; Cardiac enzymes were elevated with HS trop 913, , 35.27. EKG w/ NSR and no LISETTE  -S/P Aspirin an Brilinta load   -c/w Asa  -C/w Plavix 75mg daily  -c/w lisinopril 5mg daily   -trops peaked, no longer trending  -c/w Atorvastatin  -no plan for cath at this time as per Cards

## 2019-01-01 NOTE — PROGRESS NOTE ADULT - ASSESSMENT
28 year old man history of uncontrolled T2DM on oral Metformin, intellectual disability, bipolar vs schizophrenia presents to the ED on 12/22 with complaints of chest pain, with Type II NSTEMI w/o indication for cath.  Also presented with NBNB; found to be with elevated glucose with concern for mild DKA; now resolved. Pt w/ repeated episodes of agitation/ aggression requiring restraints and antipsychotics. Currently pending placement into group home.

## 2019-01-01 NOTE — PROGRESS NOTE BEHAVIORAL HEALTH - SECONDARY DX2
Intermittent explosive disorder in adult
Intermittent explosive disorder in adult
Intellectual disability
Intermittent explosive disorder in adult

## 2019-01-01 NOTE — PROGRESS NOTE BEHAVIORAL HEALTH - NSBHFUPINTERVALHXFT_PSY_A_CORE
Patient was seen at bedside for follow up of agitation. He was comfortably sleeping and responsive to verbal cues. He did not engage meaningfully in interview. Does not appear to be paranoid or responding to internal stimuli. No SI/HI/AH/VH elicited.

## 2019-01-01 NOTE — PROGRESS NOTE BEHAVIORAL HEALTH - SECONDARY DX1
Intellectual disability
Intellectual disability
Intermittent explosive disorder in adult
Intellectual disability

## 2019-01-02 PROCEDURE — 99232 SBSQ HOSP IP/OBS MODERATE 35: CPT | Mod: GC

## 2019-01-02 PROCEDURE — 99232 SBSQ HOSP IP/OBS MODERATE 35: CPT

## 2019-01-02 RX ADMIN — Medication 1: at 22:17

## 2019-01-02 RX ADMIN — Medication 81 MILLIGRAM(S): at 11:47

## 2019-01-02 RX ADMIN — ATORVASTATIN CALCIUM 80 MILLIGRAM(S): 80 TABLET, FILM COATED ORAL at 22:19

## 2019-01-02 RX ADMIN — HALOPERIDOL DECANOATE 20 MILLIGRAM(S): 100 INJECTION INTRAMUSCULAR at 22:19

## 2019-01-02 RX ADMIN — DIVALPROEX SODIUM 750 MILLIGRAM(S): 500 TABLET, DELAYED RELEASE ORAL at 22:19

## 2019-01-02 RX ADMIN — Medication 2 MILLIGRAM(S): at 16:18

## 2019-01-02 RX ADMIN — Medication 2: at 17:48

## 2019-01-02 RX ADMIN — DIVALPROEX SODIUM 500 MILLIGRAM(S): 500 TABLET, DELAYED RELEASE ORAL at 08:06

## 2019-01-02 RX ADMIN — INSULIN GLARGINE 24 UNIT(S): 100 INJECTION, SOLUTION SUBCUTANEOUS at 09:13

## 2019-01-02 RX ADMIN — Medication 1 MILLIGRAM(S): at 11:47

## 2019-01-02 RX ADMIN — Medication 10 UNIT(S): at 13:15

## 2019-01-02 RX ADMIN — Medication 1 MILLIGRAM(S): at 23:22

## 2019-01-02 RX ADMIN — Medication 1: at 09:14

## 2019-01-02 RX ADMIN — CLOPIDOGREL BISULFATE 75 MILLIGRAM(S): 75 TABLET, FILM COATED ORAL at 11:47

## 2019-01-02 RX ADMIN — LISINOPRIL 5 MILLIGRAM(S): 2.5 TABLET ORAL at 07:02

## 2019-01-02 RX ADMIN — Medication 10 UNIT(S): at 17:48

## 2019-01-02 RX ADMIN — Medication 13 UNIT(S): at 09:14

## 2019-01-02 NOTE — PROGRESS NOTE ADULT - SUBJECTIVE AND OBJECTIVE BOX
Patient is a 28y old  Male who presents with a chief complaint of Chest Pain, DKA (02 Jan 2019 07:16)      SUBJECTIVE / OVERNIGHT EVENTS:  No acute issues overnight. Pt slept well and was not agitated.     MEDICATIONS  (STANDING):  aspirin  chewable 81 milliGRAM(s) Oral daily  atorvastatin 80 milliGRAM(s) Oral at bedtime  benztropine 1 milliGRAM(s) Oral two times a day  clopidogrel Tablet 75 milliGRAM(s) Oral daily  dextrose 5%. 1000 milliLiter(s) (50 mL/Hr) IV Continuous <Continuous>  dextrose 50% Injectable 12.5 Gram(s) IV Push once  dextrose 50% Injectable 25 Gram(s) IV Push once  dextrose 50% Injectable 25 Gram(s) IV Push once  diVALproex  milliGRAM(s) Oral at bedtime  diVALproex  milliGRAM(s) Oral <User Schedule>  enoxaparin Injectable 40 milliGRAM(s) SubCutaneous every 24 hours  haloperidol     Tablet 20 milliGRAM(s) Oral at bedtime  insulin glargine Injectable (LANTUS) 24 Unit(s) SubCutaneous every morning  insulin lispro (HumaLOG) corrective regimen sliding scale   SubCutaneous at bedtime  insulin lispro (HumaLOG) corrective regimen sliding scale   SubCutaneous three times a day before meals  insulin lispro Injectable (HumaLOG) 10 Unit(s) SubCutaneous before dinner  insulin lispro Injectable (HumaLOG) 10 Unit(s) SubCutaneous before lunch  insulin lispro Injectable (HumaLOG) 13 Unit(s) SubCutaneous with breakfast  lisinopril 5 milliGRAM(s) Oral daily    MEDICATIONS  (PRN):  dextrose 40% Gel 15 Gram(s) Oral once PRN Blood Glucose LESS THAN 70 milliGRAM(s)/deciliter  glucagon  Injectable 1 milliGRAM(s) IntraMuscular once PRN Glucose LESS THAN 70 milligrams/deciliter  LORazepam   Injectable 2 milliGRAM(s) IV Push every 8 hours PRN Agitation  OLANZapine Injectable 5 milliGRAM(s) IntraMuscular every 6 hours PRN Severe Agitation      Vital Signs Last 24 Hrs  T(C): 36.3 (02 Jan 2019 06:52), Max: 36.8 (01 Jan 2019 22:55)  T(F): 97.4 (02 Jan 2019 06:52), Max: 98.2 (01 Jan 2019 22:55)  HR: 90 (02 Jan 2019 06:52) (83 - 111)  BP: 102/60 (02 Jan 2019 06:52) (102/60 - 166/109)  BP(mean): --  RR: 17 (02 Jan 2019 06:52) (16 - 17)  SpO2: 99% (02 Jan 2019 06:52) (98% - 99%)    CAPILLARY BLOOD GLUCOSE      POCT Blood Glucose.: 158 mg/dL (02 Jan 2019 08:36)  POCT Blood Glucose.: 218 mg/dL (01 Jan 2019 23:06)  POCT Blood Glucose.: 317 mg/dL (01 Jan 2019 17:47)  POCT Blood Glucose.: 430 mg/dL (01 Jan 2019 13:35)    I&O's Summary      PHYSICAL EXAM:  General: NAD lying in bed, doesn't respond to most questions  Cardiovascular: regular rhythm, Normal S1 S2, No JVD, No murmurs appreciated  Respiratory: Lungs clear to auscultation	  Gastrointestinal:  Soft, Non-tender, + BS	  Skin: No rashes, No ecchymoses, No cyanosis	  Extremities: Normal range of motion, No clubbing, cyanosis or edema    LABS:          CARDIAC MARKERS ( 31 Dec 2018 23:30 )  x     / x     / 819 u/L / 1.67 ng/mL / x                RESPIRATORY  VENT:    ABG:     VBG:     RADIOLOGY & ADDITIONAL TESTS:  (Imaging Personally Reviewed) NA    Consultant(s) Notes Reviewed:  Endo    Care Discussed with Consultants/Other Providers: LY Patient is a 28y old  Male who presents with a chief complaint of Chest Pain, DKA (02 Jan 2019 07:16)      SUBJECTIVE / OVERNIGHT EVENTS:  No acute issues overnight. Pt slept well and was not agitated.     MEDICATIONS  (STANDING):  aspirin  chewable 81 milliGRAM(s) Oral daily  atorvastatin 80 milliGRAM(s) Oral at bedtime  benztropine 1 milliGRAM(s) Oral two times a day  clopidogrel Tablet 75 milliGRAM(s) Oral daily  dextrose 5%. 1000 milliLiter(s) (50 mL/Hr) IV Continuous <Continuous>  dextrose 50% Injectable 12.5 Gram(s) IV Push once  dextrose 50% Injectable 25 Gram(s) IV Push once  dextrose 50% Injectable 25 Gram(s) IV Push once  diVALproex  milliGRAM(s) Oral at bedtime  diVALproex  milliGRAM(s) Oral <User Schedule>  enoxaparin Injectable 40 milliGRAM(s) SubCutaneous every 24 hours  haloperidol     Tablet 20 milliGRAM(s) Oral at bedtime  insulin glargine Injectable (LANTUS) 24 Unit(s) SubCutaneous every morning  insulin lispro (HumaLOG) corrective regimen sliding scale   SubCutaneous at bedtime  insulin lispro (HumaLOG) corrective regimen sliding scale   SubCutaneous three times a day before meals  insulin lispro Injectable (HumaLOG) 10 Unit(s) SubCutaneous before dinner  insulin lispro Injectable (HumaLOG) 10 Unit(s) SubCutaneous before lunch  insulin lispro Injectable (HumaLOG) 13 Unit(s) SubCutaneous with breakfast  lisinopril 5 milliGRAM(s) Oral daily    MEDICATIONS  (PRN):  dextrose 40% Gel 15 Gram(s) Oral once PRN Blood Glucose LESS THAN 70 milliGRAM(s)/deciliter  glucagon  Injectable 1 milliGRAM(s) IntraMuscular once PRN Glucose LESS THAN 70 milligrams/deciliter  LORazepam   Injectable 2 milliGRAM(s) IV Push every 8 hours PRN Agitation  OLANZapine Injectable 5 milliGRAM(s) IntraMuscular every 6 hours PRN Severe Agitation      Vital Signs Last 24 Hrs  T(C): 36.3 (02 Jan 2019 06:52), Max: 36.8 (01 Jan 2019 22:55)  T(F): 97.4 (02 Jan 2019 06:52), Max: 98.2 (01 Jan 2019 22:55)  HR: 90 (02 Jan 2019 06:52) (83 - 111)  BP: 102/60 (02 Jan 2019 06:52) (102/60 - 166/109)  BP(mean): --  RR: 17 (02 Jan 2019 06:52) (16 - 17)  SpO2: 99% (02 Jan 2019 06:52) (98% - 99%)    CAPILLARY BLOOD GLUCOSE  POCT Blood Glucose.: 158 mg/dL (02 Jan 2019 08:36)  POCT Blood Glucose.: 218 mg/dL (01 Jan 2019 23:06)  POCT Blood Glucose.: 317 mg/dL (01 Jan 2019 17:47)  POCT Blood Glucose.: 430 mg/dL (01 Jan 2019 13:35)    I&O's Summary      PHYSICAL EXAM:  General: NAD lying in bed, doesn't respond to most questions  Cardiovascular: regular rhythm, Normal S1 S2, No JVD, No murmurs appreciated  Respiratory: Lungs clear to auscultation	  Gastrointestinal:  Soft, Non-tender, + BS	  Skin: No rashes, No ecchymoses, No cyanosis	  Extremities: Normal range of motion, No clubbing, cyanosis or edema    LABS:      CARDIAC MARKERS ( 31 Dec 2018 23:30 )  x     / x     / 819 u/L / 1.67 ng/mL / x        RADIOLOGY & ADDITIONAL TESTS:  (Imaging Personally Reviewed) NA    Consultant(s) Notes Reviewed:  Endo, Psych    Care Discussed with Consultants/Other Providers: LY

## 2019-01-02 NOTE — PROGRESS NOTE ADULT - SUBJECTIVE AND OBJECTIVE BOX
Chief Complaint: DM2    History:  patient out in hallway.  refusing to go back in room.  As per RN, pateint ate breakfast and lunch today.  As per team, DC plan is pending     MEDICATIONS  (STANDING):  aspirin  chewable 81 milliGRAM(s) Oral daily  atorvastatin 80 milliGRAM(s) Oral at bedtime  benztropine 1 milliGRAM(s) Oral two times a day  clopidogrel Tablet 75 milliGRAM(s) Oral daily  dextrose 5%. 1000 milliLiter(s) (50 mL/Hr) IV Continuous <Continuous>  diVALproex  milliGRAM(s) Oral at bedtime  diVALproex  milliGRAM(s) Oral <User Schedule>  enoxaparin Injectable 40 milliGRAM(s) SubCutaneous every 24 hours  haloperidol     Tablet 20 milliGRAM(s) Oral at bedtime  insulin glargine Injectable (LANTUS) 24 Unit(s) SubCutaneous every morning  insulin lispro (HumaLOG) corrective regimen sliding scale   SubCutaneous at bedtime  insulin lispro (HumaLOG) corrective regimen sliding scale   SubCutaneous three times a day before meals  insulin lispro Injectable (HumaLOG) 11 Unit(s) SubCutaneous before breakfast  insulin lispro Injectable (HumaLOG) 10 Unit(s) SubCutaneous before dinner  insulin lispro Injectable (HumaLOG) 10 Unit(s) SubCutaneous before lunch  lisinopril 5 milliGRAM(s) Oral daily      MEDICATIONS  (PRN):  dextrose 40% Gel 15 Gram(s) Oral once PRN Blood Glucose LESS THAN 70 milliGRAM(s)/deciliter  glucagon  Injectable 1 milliGRAM(s) IntraMuscular once PRN Glucose LESS THAN 70 milligrams/deciliter  LORazepam   Injectable 2 milliGRAM(s) IV Push every 8 hours PRN Agitation  OLANZapine Injectable 5 milliGRAM(s) IntraMuscular every 6 hours PRN Severe Agitation      Allergies    No Known Allergies    Intolerances      Review of Systems:  UNABLE TO OBTAIN    PHYSICAL EXAM:  Vital Signs Last 24 Hrs  T(C): 36.2 (02 Jan 2019 12:14), Max: 36.8 (01 Jan 2019 22:55)  T(F): 97.2 (02 Jan 2019 12:14), Max: 98.2 (01 Jan 2019 22:55)  HR: 102 (02 Jan 2019 12:14) (83 - 102)  BP: 109/73 (02 Jan 2019 12:14) (102/60 - 109/73)  BP(mean): --  RR: 18 (02 Jan 2019 12:14) (17 - 18)  SpO2: 99% (02 Jan 2019 12:14) (98% - 99%)  GENERAL: NAD, well-groomed, well-developed  EYES: No proptosis, no lid lag, anicteric  HEENT:  Atraumatic, Normocephalic, moist mucous membranes  NEURO: extraocular movements intact, no tremor  PSYCH: awake, verbal      CAPILLARY BLOOD GLUCOSE      POCT Blood Glucose.: 143 mg/dL (02 Jan 2019 12:47)  POCT Blood Glucose.: 157 mg/dL (02 Jan 2019 11:45)  POCT Blood Glucose.: 158 mg/dL (02 Jan 2019 08:36)  POCT Blood Glucose.: 218 mg/dL (01 Jan 2019 23:06)  POCT Blood Glucose.: 317 mg/dL (01 Jan 2019 17:47)    POCT Blood Glucose.: 430 mg/dL (01-01-19 @ 13:35)  POCT Blood Glucose.: 186 mg/dL (12-31-18 @ 22:12)  POCT Blood Glucose.: 284 mg/dL (12-31-18 @ 17:38)  POCT Blood Glucose.: 273 mg/dL (12-31-18 @ 12:26)  POCT Blood Glucose.: 132 mg/dL (12-31-18 @ 08:59)  POCT Blood Glucose.: 199 mg/dL (12-30-18 @ 23:39)  POCT Blood Glucose.: 154 mg/dL (12-30-18 @ 17:30)  POCT Blood Glucose.: 218 mg/dL (12-30-18 @ 12:51)  POCT Blood Glucose.: 165 mg/dL (12-30-18 @ 09:01)  POCT Blood Glucose.: 170 mg/dL (12-29-18 @ 22:06)  POCT Blood Glucose.: 115 mg/dL (12-29-18 @ 17:52)    12-30    141  |  105  |  4<L>  ----------------------------<  149<H>  4.5   |  25  |  0.68    EGFR if : 152  EGFR if non : 131    Ca    9.2      12-30  Mg     1.7     12-30  Phos  4.1     12-30            Thyroid Function Tests:      Hemoglobin A1C, Whole Blood: 13.8 % <H> [4.0 - 5.6] (12-24-18 @ 07:15)

## 2019-01-02 NOTE — PROGRESS NOTE ADULT - PROBLEM SELECTOR PLAN 1
Patient admitted for DKA. patient MUST ALWAYS receive basal insulin to prevent return of DKA.   For now, continue with Lantus to 24 daily, patient gets the does every morning.   Will c/w Humalog to 13/10/10 breakfast/lunch/dinner.   Continue low correction scale premeal and low bedtime scale.    -would advise that primary team be notified if pt refuses Lantus/insulin as he is otherwise at risk for DKA.    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    Patient will need basal bolus insulin at [place] (to be administered at group home).  He will need outpatient endocrine follow up [phone].

## 2019-01-02 NOTE — PROGRESS NOTE ADULT - PROBLEM SELECTOR PLAN 2
- Initially occurred likely in setting of poorly uncontrolled DM and non-compliance with diet. no clear infectious source. UA negative. CXR clear.   - Resolved, AG closed  - FS now 200s-400s yesterday  - as per endo continue with Lantus to 24 daily. Increased to Humalog to 13/10/10 breakfast/lunch/dinner. - Initially occurred likely in setting of poorly uncontrolled DM and non-compliance with diet. no clear infectious source. UA negative. CXR clear.   - Resolved, AG closed  - FS 200s-400s yesterday  - as per endo continue with Lantus to 24 daily. Increased to Humalog to 13/10/10 breakfast/lunch/dinner.  -will get in touch w/ endo regarding possible insulin pump

## 2019-01-03 PROCEDURE — 99233 SBSQ HOSP IP/OBS HIGH 50: CPT | Mod: GC

## 2019-01-03 PROCEDURE — 99233 SBSQ HOSP IP/OBS HIGH 50: CPT

## 2019-01-03 PROCEDURE — 99232 SBSQ HOSP IP/OBS MODERATE 35: CPT

## 2019-01-03 RX ORDER — INSULIN LISPRO 100/ML
12 VIAL (ML) SUBCUTANEOUS
Qty: 0 | Refills: 0 | Status: DISCONTINUED | OUTPATIENT
Start: 2019-01-03 | End: 2019-01-04

## 2019-01-03 RX ORDER — DIVALPROEX SODIUM 500 MG/1
750 TABLET, DELAYED RELEASE ORAL
Qty: 0 | Refills: 0 | Status: DISCONTINUED | OUTPATIENT
Start: 2019-01-04 | End: 2019-01-08

## 2019-01-03 RX ORDER — INSULIN LISPRO 100/ML
14 VIAL (ML) SUBCUTANEOUS
Qty: 0 | Refills: 0 | Status: DISCONTINUED | OUTPATIENT
Start: 2019-01-03 | End: 2019-01-07

## 2019-01-03 RX ORDER — INSULIN GLARGINE 100 [IU]/ML
26 INJECTION, SOLUTION SUBCUTANEOUS EVERY MORNING
Qty: 0 | Refills: 0 | Status: DISCONTINUED | OUTPATIENT
Start: 2019-01-04 | End: 2019-01-07

## 2019-01-03 RX ADMIN — ATORVASTATIN CALCIUM 80 MILLIGRAM(S): 80 TABLET, FILM COATED ORAL at 22:03

## 2019-01-03 RX ADMIN — CLOPIDOGREL BISULFATE 75 MILLIGRAM(S): 75 TABLET, FILM COATED ORAL at 12:26

## 2019-01-03 RX ADMIN — Medication 3: at 09:54

## 2019-01-03 RX ADMIN — Medication 1 MILLIGRAM(S): at 23:55

## 2019-01-03 RX ADMIN — DIVALPROEX SODIUM 750 MILLIGRAM(S): 500 TABLET, DELAYED RELEASE ORAL at 22:03

## 2019-01-03 RX ADMIN — DIVALPROEX SODIUM 500 MILLIGRAM(S): 500 TABLET, DELAYED RELEASE ORAL at 09:53

## 2019-01-03 RX ADMIN — Medication 12 UNIT(S): at 13:03

## 2019-01-03 RX ADMIN — Medication 2 MILLIGRAM(S): at 16:17

## 2019-01-03 RX ADMIN — LISINOPRIL 5 MILLIGRAM(S): 2.5 TABLET ORAL at 06:47

## 2019-01-03 RX ADMIN — Medication 81 MILLIGRAM(S): at 12:26

## 2019-01-03 RX ADMIN — Medication 1 MILLIGRAM(S): at 12:26

## 2019-01-03 RX ADMIN — Medication 2: at 18:08

## 2019-01-03 RX ADMIN — Medication 12 UNIT(S): at 18:08

## 2019-01-03 RX ADMIN — Medication 13 UNIT(S): at 09:53

## 2019-01-03 RX ADMIN — Medication 2 MILLIGRAM(S): at 03:48

## 2019-01-03 RX ADMIN — Medication 3: at 13:04

## 2019-01-03 RX ADMIN — INSULIN GLARGINE 24 UNIT(S): 100 INJECTION, SOLUTION SUBCUTANEOUS at 09:53

## 2019-01-03 RX ADMIN — ENOXAPARIN SODIUM 40 MILLIGRAM(S): 100 INJECTION SUBCUTANEOUS at 12:26

## 2019-01-03 RX ADMIN — HALOPERIDOL DECANOATE 20 MILLIGRAM(S): 100 INJECTION INTRAMUSCULAR at 22:03

## 2019-01-03 NOTE — PROGRESS NOTE ADULT - PROBLEM SELECTOR PLAN 1
-c/w home meds  ---Haldol 20mg qhs; as per psych, inc depakote from 500mg to 750mg PO AM dose, maintain Depakote 750mg PO HS dose  -F/U psych recs  -----Haldol 5mg PRN  -----Zyprexa 5mg PRN  -----Ativan 2mg PRN

## 2019-01-03 NOTE — PROGRESS NOTE ADULT - PROBLEM SELECTOR PLAN 1
Patient admitted for DKA. patient MUST ALWAYS receive basal insulin to prevent return of DKA.   For now, increase Lantus to 26 daily, patient gets the does every morning.   Will increase Humalog to 14/12/12 breakfast/lunch/dinner.   Continue low correction scale premeal and low bedtime scale.    -would advise that primary team be notified if pt refuses Lantus/insulin as he is otherwise at risk for DKA.    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    Patient will need basal bolus insulin at [place] (to be administered at group home).  He will need outpatient endocrine follow up [phone].

## 2019-01-03 NOTE — PROGRESS NOTE BEHAVIORAL HEALTH - OTHER
superficially cooperative by hx: impaired; remains unpredictable occasionally mumbling incomprehensible words unable to be assessed; not saying unable to be assessed concrete

## 2019-01-03 NOTE — PROGRESS NOTE ADULT - SUBJECTIVE AND OBJECTIVE BOX
Patient is a 28y old  Male who presents with a chief complaint of Chest Pain, DKA (03 Jan 2019 13:33)      SUBJECTIVE / OVERNIGHT EVENTS:  No acute events overnight. Pt expresses the desire to go home.       MEDICATIONS  (STANDING):  aspirin  chewable 81 milliGRAM(s) Oral daily  atorvastatin 80 milliGRAM(s) Oral at bedtime  benztropine 1 milliGRAM(s) Oral two times a day  clopidogrel Tablet 75 milliGRAM(s) Oral daily  dextrose 5%. 1000 milliLiter(s) (50 mL/Hr) IV Continuous <Continuous>  dextrose 50% Injectable 12.5 Gram(s) IV Push once  dextrose 50% Injectable 25 Gram(s) IV Push once  dextrose 50% Injectable 25 Gram(s) IV Push once  diVALproex  milliGRAM(s) Oral at bedtime  enoxaparin Injectable 40 milliGRAM(s) SubCutaneous every 24 hours  haloperidol     Tablet 20 milliGRAM(s) Oral at bedtime  insulin lispro (HumaLOG) corrective regimen sliding scale   SubCutaneous at bedtime  insulin lispro (HumaLOG) corrective regimen sliding scale   SubCutaneous three times a day before meals  insulin lispro Injectable (HumaLOG) 12 Unit(s) SubCutaneous before dinner  insulin lispro Injectable (HumaLOG) 12 Unit(s) SubCutaneous before lunch  insulin lispro Injectable (HumaLOG) 14 Unit(s) SubCutaneous with breakfast  lisinopril 5 milliGRAM(s) Oral daily    MEDICATIONS  (PRN):  dextrose 40% Gel 15 Gram(s) Oral once PRN Blood Glucose LESS THAN 70 milliGRAM(s)/deciliter  glucagon  Injectable 1 milliGRAM(s) IntraMuscular once PRN Glucose LESS THAN 70 milligrams/deciliter  LORazepam   Injectable 2 milliGRAM(s) IV Push every 8 hours PRN Agitation  OLANZapine Injectable 5 milliGRAM(s) IntraMuscular every 6 hours PRN Severe Agitation      Vital Signs Last 24 Hrs  T(C): 36.8 (03 Jan 2019 12:23), Max: 36.8 (03 Jan 2019 12:23)  T(F): 98.2 (03 Jan 2019 12:23), Max: 98.2 (03 Jan 2019 12:23)  HR: 82 (03 Jan 2019 12:23) (79 - 95)  BP: 119/71 (03 Jan 2019 12:23) (103/64 - 125/83)  BP(mean): --  RR: 18 (03 Jan 2019 12:23) (18 - 18)  SpO2: 98% (03 Jan 2019 12:23) (98% - 100%)    CAPILLARY BLOOD GLUCOSE      POCT Blood Glucose.: 261 mg/dL (03 Jan 2019 12:49)  POCT Blood Glucose.: 273 mg/dL (03 Jan 2019 09:52)  POCT Blood Glucose.: 275 mg/dL (02 Jan 2019 22:13)  POCT Blood Glucose.: 216 mg/dL (02 Jan 2019 17:22)    I&O's Summary      PHYSICAL EXAM:  General: NAD lying in bed, doesn't respond to most questions  Cardiovascular: regular rhythm, Normal S1 S2, No JVD, No murmurs appreciated  Respiratory: Lungs clear to auscultation	  Gastrointestinal:  Soft, Non-tender, + BS	  Skin: No rashes, No ecchymoses, No cyanosis	  Extremities: Normal range of motion, No clubbing, cyanosis or edema    LABS:                    RESPIRATORY  VENT:    ABG:     VBG:     RADIOLOGY & ADDITIONAL TESTS:  (Imaging Personally Reviewed) NA    Consultant(s) Notes Reviewed:  psych, endo    Care Discussed with Consultants/Other Providers: NA Patient is a 28y old  Male who presents with a chief complaint of Chest Pain, DKA (03 Jan 2019 13:33)      SUBJECTIVE / OVERNIGHT EVENTS:  No acute events overnight. Pt expresses the desire to go home.       MEDICATIONS  (STANDING):  aspirin  chewable 81 milliGRAM(s) Oral daily  atorvastatin 80 milliGRAM(s) Oral at bedtime  benztropine 1 milliGRAM(s) Oral two times a day  clopidogrel Tablet 75 milliGRAM(s) Oral daily  dextrose 5%. 1000 milliLiter(s) (50 mL/Hr) IV Continuous <Continuous>  dextrose 50% Injectable 12.5 Gram(s) IV Push once  dextrose 50% Injectable 25 Gram(s) IV Push once  dextrose 50% Injectable 25 Gram(s) IV Push once  diVALproex  milliGRAM(s) Oral at bedtime  enoxaparin Injectable 40 milliGRAM(s) SubCutaneous every 24 hours  haloperidol     Tablet 20 milliGRAM(s) Oral at bedtime  insulin lispro (HumaLOG) corrective regimen sliding scale   SubCutaneous at bedtime  insulin lispro (HumaLOG) corrective regimen sliding scale   SubCutaneous three times a day before meals  insulin lispro Injectable (HumaLOG) 12 Unit(s) SubCutaneous before dinner  insulin lispro Injectable (HumaLOG) 12 Unit(s) SubCutaneous before lunch  insulin lispro Injectable (HumaLOG) 14 Unit(s) SubCutaneous with breakfast  lisinopril 5 milliGRAM(s) Oral daily    MEDICATIONS  (PRN):  dextrose 40% Gel 15 Gram(s) Oral once PRN Blood Glucose LESS THAN 70 milliGRAM(s)/deciliter  glucagon  Injectable 1 milliGRAM(s) IntraMuscular once PRN Glucose LESS THAN 70 milligrams/deciliter  LORazepam   Injectable 2 milliGRAM(s) IV Push every 8 hours PRN Agitation  OLANZapine Injectable 5 milliGRAM(s) IntraMuscular every 6 hours PRN Severe Agitation      Vital Signs Last 24 Hrs  T(C): 36.8 (03 Jan 2019 12:23), Max: 36.8 (03 Jan 2019 12:23)  T(F): 98.2 (03 Jan 2019 12:23), Max: 98.2 (03 Jan 2019 12:23)  HR: 82 (03 Jan 2019 12:23) (79 - 95)  BP: 119/71 (03 Jan 2019 12:23) (103/64 - 125/83)  BP(mean): --  RR: 18 (03 Jan 2019 12:23) (18 - 18)  SpO2: 98% (03 Jan 2019 12:23) (98% - 100%)    CAPILLARY BLOOD GLUCOSE      POCT Blood Glucose.: 261 mg/dL (03 Jan 2019 12:49)  POCT Blood Glucose.: 273 mg/dL (03 Jan 2019 09:52)  POCT Blood Glucose.: 275 mg/dL (02 Jan 2019 22:13)  POCT Blood Glucose.: 216 mg/dL (02 Jan 2019 17:22)    I&O's Summary      PHYSICAL EXAM:  General: NAD lying in bed, doesn't respond to most questions  Cardiovascular: regular rhythm, Normal S1 S2, No JVD, No murmurs appreciated  Respiratory: Lungs clear to auscultation	  Gastrointestinal:  Soft, Non-tender, + BS	  Skin: No rashes, No ecchymoses, No cyanosis	  Extremities: Normal range of motion, No clubbing, cyanosis or edema    LABS:    RADIOLOGY & ADDITIONAL TESTS:  (Imaging Personally Reviewed) NA    Consultant(s) Notes Reviewed:  psych, endo    Care Discussed with Consultants/Other Providers: NA

## 2019-01-03 NOTE — PROGRESS NOTE BEHAVIORAL HEALTH - [id number]_PSY_A_CORE
Seen bedside today. He is calm and cooperative. He is eating his breakfast.  Pt is asking when he can go home. Otherwise, He reports good sleep and no changes in his appetite.     Of note:  last 12/27/18, he had been agitated/aggressive that morning but the agitation had been attributed to likely poor frustration tolerance to finger sticks and insulin injections. Last 12/28/18, the Pt also displayed intermittent bouts of agitation, poor frustration tolerance again within the context of insulin administration and finger sticks.

## 2019-01-03 NOTE — PROGRESS NOTE ADULT - SUBJECTIVE AND OBJECTIVE BOX
Chief Complaint: DM2    History:  patient out in hallway.  refusing to go back in room.  As per RN, pateint ate breakfast and lunch today.  As per team, DC plan is pending     MEDICATIONS  (STANDING):  aspirin  chewable 81 milliGRAM(s) Oral daily  atorvastatin 80 milliGRAM(s) Oral at bedtime  benztropine 1 milliGRAM(s) Oral two times a day  clopidogrel Tablet 75 milliGRAM(s) Oral daily  dextrose 5%. 1000 milliLiter(s) (50 mL/Hr) IV Continuous <Continuous>  diVALproex  milliGRAM(s) Oral at bedtime  diVALproex  milliGRAM(s) Oral <User Schedule>  enoxaparin Injectable 40 milliGRAM(s) SubCutaneous every 24 hours  haloperidol     Tablet 20 milliGRAM(s) Oral at bedtime  insulin glargine Injectable (LANTUS) 24 Unit(s) SubCutaneous every morning  insulin lispro (HumaLOG) corrective regimen sliding scale   SubCutaneous at bedtime  insulin lispro (HumaLOG) corrective regimen sliding scale   SubCutaneous three times a day before meals  insulin lispro Injectable (HumaLOG) 11 Unit(s) SubCutaneous before breakfast  insulin lispro Injectable (HumaLOG) 10 Unit(s) SubCutaneous before dinner  insulin lispro Injectable (HumaLOG) 10 Unit(s) SubCutaneous before lunch  lisinopril 5 milliGRAM(s) Oral daily      MEDICATIONS  (PRN):  dextrose 40% Gel 15 Gram(s) Oral once PRN Blood Glucose LESS THAN 70 milliGRAM(s)/deciliter  glucagon  Injectable 1 milliGRAM(s) IntraMuscular once PRN Glucose LESS THAN 70 milligrams/deciliter  LORazepam   Injectable 2 milliGRAM(s) IV Push every 8 hours PRN Agitation  OLANZapine Injectable 5 milliGRAM(s) IntraMuscular every 6 hours PRN Severe Agitation      Allergies    No Known Allergies    Intolerances      Review of Systems:  UNABLE TO OBTAIN    PHYSICAL EXAM:  Vital Signs Last 24 Hrs  T(C): 36.2 (02 Jan 2019 12:14), Max: 36.8 (01 Jan 2019 22:55)  T(F): 97.2 (02 Jan 2019 12:14), Max: 98.2 (01 Jan 2019 22:55)  HR: 102 (02 Jan 2019 12:14) (83 - 102)  BP: 109/73 (02 Jan 2019 12:14) (102/60 - 109/73)  BP(mean): --  RR: 18 (02 Jan 2019 12:14) (17 - 18)  SpO2: 99% (02 Jan 2019 12:14) (98% - 99%)  GENERAL: NAD, well-groomed, well-developed  EYES: No proptosis, no lid lag, anicteric  HEENT:  Atraumatic, Normocephalic, moist mucous membranes  NEURO: extraocular movements intact, no tremor  PSYCH: awake, verbal      CAPILLARY BLOOD GLUCOSE      POCT Blood Glucose.: 143 mg/dL (02 Jan 2019 12:47)  POCT Blood Glucose.: 157 mg/dL (02 Jan 2019 11:45)  POCT Blood Glucose.: 158 mg/dL (02 Jan 2019 08:36)  POCT Blood Glucose.: 218 mg/dL (01 Jan 2019 23:06)  POCT Blood Glucose.: 317 mg/dL (01 Jan 2019 17:47)    POCT Blood Glucose.: 430 mg/dL (01-01-19 @ 13:35)  POCT Blood Glucose.: 186 mg/dL (12-31-18 @ 22:12)  POCT Blood Glucose.: 284 mg/dL (12-31-18 @ 17:38)  POCT Blood Glucose.: 273 mg/dL (12-31-18 @ 12:26)  POCT Blood Glucose.: 132 mg/dL (12-31-18 @ 08:59)  POCT Blood Glucose.: 199 mg/dL (12-30-18 @ 23:39)  POCT Blood Glucose.: 154 mg/dL (12-30-18 @ 17:30)  POCT Blood Glucose.: 218 mg/dL (12-30-18 @ 12:51)  POCT Blood Glucose.: 165 mg/dL (12-30-18 @ 09:01)  POCT Blood Glucose.: 170 mg/dL (12-29-18 @ 22:06)  POCT Blood Glucose.: 115 mg/dL (12-29-18 @ 17:52)    12-30    141  |  105  |  4<L>  ----------------------------<  149<H>  4.5   |  25  |  0.68    EGFR if : 152  EGFR if non : 131    Ca    9.2      12-30  Mg     1.7     12-30  Phos  4.1     12-30            Thyroid Function Tests:      Hemoglobin A1C, Whole Blood: 13.8 % <H> [4.0 - 5.6] (12-24-18 @ 07:15) Chief Complaint: DM2    History:  Denies n/v, tolerates po.  no hypoglycemia.      MEDICATIONS  (STANDING):  aspirin  chewable 81 milliGRAM(s) Oral daily  atorvastatin 80 milliGRAM(s) Oral at bedtime  benztropine 1 milliGRAM(s) Oral two times a day  clopidogrel Tablet 75 milliGRAM(s) Oral daily  dextrose 5%. 1000 milliLiter(s) (50 mL/Hr) IV Continuous <Continuous>  diVALproex  milliGRAM(s) Oral at bedtime  diVALproex  milliGRAM(s) Oral <User Schedule>  enoxaparin Injectable 40 milliGRAM(s) SubCutaneous every 24 hours  haloperidol     Tablet 20 milliGRAM(s) Oral at bedtime  insulin glargine Injectable (LANTUS) 24 Unit(s) SubCutaneous every morning  insulin lispro (HumaLOG) corrective regimen sliding scale   SubCutaneous at bedtime  insulin lispro (HumaLOG) corrective regimen sliding scale   SubCutaneous three times a day before meals  insulin lispro Injectable (HumaLOG) 11 Unit(s) SubCutaneous before breakfast  insulin lispro Injectable (HumaLOG) 10 Unit(s) SubCutaneous before dinner  insulin lispro Injectable (HumaLOG) 10 Unit(s) SubCutaneous before lunch  lisinopril 5 milliGRAM(s) Oral daily      MEDICATIONS  (PRN):  dextrose 40% Gel 15 Gram(s) Oral once PRN Blood Glucose LESS THAN 70 milliGRAM(s)/deciliter  glucagon  Injectable 1 milliGRAM(s) IntraMuscular once PRN Glucose LESS THAN 70 milligrams/deciliter  LORazepam   Injectable 2 milliGRAM(s) IV Push every 8 hours PRN Agitation  OLANZapine Injectable 5 milliGRAM(s) IntraMuscular every 6 hours PRN Severe Agitation      Allergies    No Known Allergies    Intolerances      Review of Systems:  UNABLE TO OBTAIN    PHYSICAL EXAM:  Vital Signs Last 24 Hrs  Vital Signs Last 24 Hrs  T(C): 36.8 (03 Jan 2019 12:23), Max: 36.8 (03 Jan 2019 12:23)  T(F): 98.2 (03 Jan 2019 12:23), Max: 98.2 (03 Jan 2019 12:23)  HR: 82 (03 Jan 2019 12:23) (79 - 95)  BP: 119/71 (03 Jan 2019 12:23) (103/64 - 125/83)  BP(mean): --  RR: 18 (03 Jan 2019 12:23) (18 - 18)  SpO2: 98% (03 Jan 2019 12:23) (98% - 100%)  GENERAL: NAD, well-groomed, well-developed  EYES: No proptosis, no lid lag, anicteric  HEENT:  Atraumatic, Normocephalic, moist mucous membranes  NEURO: extraocular movements intact, no tremor  PSYCH: awake, verbal      CAPILLARY BLOOD GLUCOSE      POCT Blood Glucose.: 261 mg/dL (03 Jan 2019 12:49)  POCT Blood Glucose.: 273 mg/dL (03 Jan 2019 09:52)  POCT Blood Glucose.: 275 mg/dL (02 Jan 2019 22:13)  POCT Blood Glucose.: 216 mg/dL (02 Jan 2019 17:22)    POCT Blood Glucose.: 143 mg/dL (02 Jan 2019 12:47)  POCT Blood Glucose.: 157 mg/dL (02 Jan 2019 11:45)  POCT Blood Glucose.: 158 mg/dL (02 Jan 2019 08:36)  POCT Blood Glucose.: 218 mg/dL (01 Jan 2019 23:06)  POCT Blood Glucose.: 317 mg/dL (01 Jan 2019 17:47)    POCT Blood Glucose.: 430 mg/dL (01-01-19 @ 13:35)  POCT Blood Glucose.: 186 mg/dL (12-31-18 @ 22:12)  POCT Blood Glucose.: 284 mg/dL (12-31-18 @ 17:38)  POCT Blood Glucose.: 273 mg/dL (12-31-18 @ 12:26)  POCT Blood Glucose.: 132 mg/dL (12-31-18 @ 08:59)  POCT Blood Glucose.: 199 mg/dL (12-30-18 @ 23:39)  POCT Blood Glucose.: 154 mg/dL (12-30-18 @ 17:30)  POCT Blood Glucose.: 218 mg/dL (12-30-18 @ 12:51)  POCT Blood Glucose.: 165 mg/dL (12-30-18 @ 09:01)  POCT Blood Glucose.: 170 mg/dL (12-29-18 @ 22:06)  POCT Blood Glucose.: 115 mg/dL (12-29-18 @ 17:52)    12-30    141  |  105  |  4<L>  ----------------------------<  149<H>  4.5   |  25  |  0.68    EGFR if : 152  EGFR if non : 131    Ca    9.2      12-30  Mg     1.7     12-30  Phos  4.1     12-30            Thyroid Function Tests:      Hemoglobin A1C, Whole Blood: 13.8 % <H> [4.0 - 5.6] (12-24-18 @ 07:15)

## 2019-01-03 NOTE — PROGRESS NOTE ADULT - PROBLEM SELECTOR PLAN 2
- Initially occurred likely in setting of poorly uncontrolled DM and non-compliance with diet. no clear infectious source. UA negative. CXR clear.   - Resolved, AG closed  - FS 200s overnight  - For now, increase Lantus to 26 daily, patient gets the does every morning.   Will increase Humalog to 14/12/12 breakfast/lunch/dinner.   -will get in touch w/ endo regarding possible insulin pump - Initially occurred likely in setting of poorly uncontrolled DM and non-compliance with diet. no clear infectious source. UA negative. CXR clear.   - Resolved, AG closed  - FS 200s  - For now, increase Lantus to 26 daily, patient gets the dose every morning.   Will increase Humalog to 14/12/12 breakfast/lunch/dinner.   -will get in touch w/ endo regarding possible insulin pump

## 2019-01-03 NOTE — PROGRESS NOTE BEHAVIORAL HEALTH - SUMMARY
28/M with past psych hx of: bipolar affective disorder, Intermittent Explosive Disorder, intellectual disability (IQ=40), multiple inpatient psychiatric admissions. Has long hx of aggression toward family with chronic poor frustration tolerance. Pt has no documented prior SA and no substance abuse hx. On follow up with Psychiatry for continued mgt of his agitation.     Pt had been previously showing bouts of agitation, low frustration tolerance. at present, no verbalization of SI/HI. No signs/symptoms suggestive of delirium. Not acutely psychotic.       Recommendations:   continue with Haldol 20mg HS PO  inc Depakote from 500mg to 750mg PO AM dose  maintain Depakote 750mg PO HS dose    need to have repeat Depakote level on 1/9/2019    PRNs:   Ativan 2mg IVP q8Hrs PRN for agitation (hold for sedation)  Zyprexa 5mg IM q6Hrs PRN for severe agitation      needs continued 1:1 constant ob

## 2019-01-04 PROCEDURE — 99232 SBSQ HOSP IP/OBS MODERATE 35: CPT

## 2019-01-04 PROCEDURE — 99233 SBSQ HOSP IP/OBS HIGH 50: CPT

## 2019-01-04 RX ORDER — INSULIN LISPRO 100/ML
14 VIAL (ML) SUBCUTANEOUS
Qty: 0 | Refills: 0 | Status: DISCONTINUED | OUTPATIENT
Start: 2019-01-04 | End: 2019-01-07

## 2019-01-04 RX ADMIN — Medication 14 UNIT(S): at 19:09

## 2019-01-04 RX ADMIN — Medication 14 UNIT(S): at 13:12

## 2019-01-04 RX ADMIN — DIVALPROEX SODIUM 750 MILLIGRAM(S): 500 TABLET, DELAYED RELEASE ORAL at 10:15

## 2019-01-04 RX ADMIN — Medication 2: at 13:12

## 2019-01-04 RX ADMIN — ATORVASTATIN CALCIUM 80 MILLIGRAM(S): 80 TABLET, FILM COATED ORAL at 22:24

## 2019-01-04 RX ADMIN — LISINOPRIL 5 MILLIGRAM(S): 2.5 TABLET ORAL at 07:00

## 2019-01-04 RX ADMIN — INSULIN GLARGINE 26 UNIT(S): 100 INJECTION, SOLUTION SUBCUTANEOUS at 09:08

## 2019-01-04 RX ADMIN — DIVALPROEX SODIUM 750 MILLIGRAM(S): 500 TABLET, DELAYED RELEASE ORAL at 22:24

## 2019-01-04 RX ADMIN — ENOXAPARIN SODIUM 40 MILLIGRAM(S): 100 INJECTION SUBCUTANEOUS at 13:08

## 2019-01-04 RX ADMIN — CLOPIDOGREL BISULFATE 75 MILLIGRAM(S): 75 TABLET, FILM COATED ORAL at 13:08

## 2019-01-04 RX ADMIN — Medication 1 MILLIGRAM(S): at 23:06

## 2019-01-04 RX ADMIN — OLANZAPINE 5 MILLIGRAM(S): 15 TABLET, FILM COATED ORAL at 19:09

## 2019-01-04 RX ADMIN — Medication 81 MILLIGRAM(S): at 13:08

## 2019-01-04 RX ADMIN — Medication 1 MILLIGRAM(S): at 13:08

## 2019-01-04 RX ADMIN — HALOPERIDOL DECANOATE 20 MILLIGRAM(S): 100 INJECTION INTRAMUSCULAR at 22:24

## 2019-01-04 RX ADMIN — Medication 14 UNIT(S): at 09:10

## 2019-01-04 RX ADMIN — Medication 2 MILLIGRAM(S): at 15:48

## 2019-01-04 RX ADMIN — Medication 3: at 09:09

## 2019-01-04 RX ADMIN — Medication 4: at 19:08

## 2019-01-04 NOTE — PROGRESS NOTE ADULT - SUBJECTIVE AND OBJECTIVE BOX
Chief Complaint/Follow-up on: DM    Subjective:  POC blood glucose and insulin use reviewed.  FSBG runnign elevated in the 200s  this am 280  lantus increased this am to 26 from 24   eating   pending placement      MEDICATIONS  (STANDING):  aspirin  chewable 81 milliGRAM(s) Oral daily  atorvastatin 80 milliGRAM(s) Oral at bedtime  benztropine 1 milliGRAM(s) Oral two times a day  clopidogrel Tablet 75 milliGRAM(s) Oral daily  dextrose 5%. 1000 milliLiter(s) (50 mL/Hr) IV Continuous <Continuous>  dextrose 50% Injectable 12.5 Gram(s) IV Push once  dextrose 50% Injectable 25 Gram(s) IV Push once  dextrose 50% Injectable 25 Gram(s) IV Push once  diVALproex  milliGRAM(s) Oral at bedtime  diVALproex  milliGRAM(s) Oral <User Schedule>  enoxaparin Injectable 40 milliGRAM(s) SubCutaneous every 24 hours  haloperidol     Tablet 20 milliGRAM(s) Oral at bedtime  insulin glargine Injectable (LANTUS) 26 Unit(s) SubCutaneous every morning  insulin lispro (HumaLOG) corrective regimen sliding scale   SubCutaneous at bedtime  insulin lispro (HumaLOG) corrective regimen sliding scale   SubCutaneous three times a day before meals  insulin lispro Injectable (HumaLOG) 14 Unit(s) SubCutaneous with breakfast  insulin lispro Injectable (HumaLOG) 14 Unit(s) SubCutaneous before dinner  insulin lispro Injectable (HumaLOG) 14 Unit(s) SubCutaneous before lunch  lisinopril 5 milliGRAM(s) Oral daily    MEDICATIONS  (PRN):  dextrose 40% Gel 15 Gram(s) Oral once PRN Blood Glucose LESS THAN 70 milliGRAM(s)/deciliter  glucagon  Injectable 1 milliGRAM(s) IntraMuscular once PRN Glucose LESS THAN 70 milligrams/deciliter  LORazepam   Injectable 2 milliGRAM(s) IV Push every 8 hours PRN Agitation  OLANZapine Injectable 5 milliGRAM(s) IntraMuscular every 6 hours PRN Severe Agitation      PHYSICAL EXAM:  VITALS: T(C): 36.7 (01-04-19 @ 06:59)  T(F): 98.1 (01-04-19 @ 06:59), Max: 98.2 (01-03-19 @ 12:23)  HR: 68 (01-04-19 @ 06:59) (68 - 82)  BP: 103/61 (01-04-19 @ 06:59) (103/61 - 119/71)  RR:  (18 - 18)  SpO2:  (97% - 100%)  Wt(kg): --  GENERAL: NAD, well-groomed, well-developed  EYES: No proptosis, no injection  HEENT:  Atraumatic, Normocephalic, moist mucous membranes  RESPIRATORY: Clear to auscultation bilaterally; No rales, rhonchi, wheezing, or rubs  CARDIOVASCULAR: Regular rate and rhythm; No murmurs; no peripheral edema  GI: Soft, nontender, non distended, normal bowel sounds      POCT Blood Glucose.: 280 mg/dL (01-04-19 @ 08:54)  POCT Blood Glucose.: 150 mg/dL (01-03-19 @ 22:04)  POCT Blood Glucose.: 238 mg/dL (01-03-19 @ 18:07)  POCT Blood Glucose.: 261 mg/dL (01-03-19 @ 12:49)  POCT Blood Glucose.: 273 mg/dL (01-03-19 @ 09:52)  POCT Blood Glucose.: 275 mg/dL (01-02-19 @ 22:13)  POCT Blood Glucose.: 216 mg/dL (01-02-19 @ 17:22)  POCT Blood Glucose.: 143 mg/dL (01-02-19 @ 12:47)  POCT Blood Glucose.: 157 mg/dL (01-02-19 @ 11:45)  POCT Blood Glucose.: 158 mg/dL (01-02-19 @ 08:36)  POCT Blood Glucose.: 218 mg/dL (01-01-19 @ 23:06)  POCT Blood Glucose.: 317 mg/dL (01-01-19 @ 17:47)  POCT Blood Glucose.: 430 mg/dL (01-01-19 @ 13:35)                Thyroid Function Tests:      Hemoglobin A1C, Whole Blood: 13.8 % <H> [4.0 - 5.6] (12-24-18 @ 07:15)

## 2019-01-04 NOTE — PROGRESS NOTE ADULT - PROBLEM SELECTOR PLAN 1
-c/w depakote 750mg PO AM and Depakote 750mg PO qhs  -c/w haldol 20mg qhs and cogentin 1mg qd   -c/w Zyprexa 5mg PRN  -will continue to appreciate psych recs   -c/w constant observation

## 2019-01-04 NOTE — PROGRESS NOTE ADULT - SUBJECTIVE AND OBJECTIVE BOX
Patient is a 28y old  Male who presents with a chief complaint of Chest Pain, DKA (04 Jan 2019 09:41)      SUBJECTIVE / OVERNIGHT EVENTS:    Review of Systems:     MEDICATIONS  (STANDING):  aspirin  chewable 81 milliGRAM(s) Oral daily  atorvastatin 80 milliGRAM(s) Oral at bedtime  benztropine 1 milliGRAM(s) Oral two times a day  clopidogrel Tablet 75 milliGRAM(s) Oral daily  dextrose 5%. 1000 milliLiter(s) (50 mL/Hr) IV Continuous <Continuous>  dextrose 50% Injectable 12.5 Gram(s) IV Push once  dextrose 50% Injectable 25 Gram(s) IV Push once  dextrose 50% Injectable 25 Gram(s) IV Push once  diVALproex  milliGRAM(s) Oral at bedtime  diVALproex  milliGRAM(s) Oral <User Schedule>  enoxaparin Injectable 40 milliGRAM(s) SubCutaneous every 24 hours  haloperidol     Tablet 20 milliGRAM(s) Oral at bedtime  insulin glargine Injectable (LANTUS) 26 Unit(s) SubCutaneous every morning  insulin lispro (HumaLOG) corrective regimen sliding scale   SubCutaneous at bedtime  insulin lispro (HumaLOG) corrective regimen sliding scale   SubCutaneous three times a day before meals  insulin lispro Injectable (HumaLOG) 14 Unit(s) SubCutaneous with breakfast  insulin lispro Injectable (HumaLOG) 14 Unit(s) SubCutaneous before dinner  insulin lispro Injectable (HumaLOG) 14 Unit(s) SubCutaneous before lunch  lisinopril 5 milliGRAM(s) Oral daily    MEDICATIONS  (PRN):  dextrose 40% Gel 15 Gram(s) Oral once PRN Blood Glucose LESS THAN 70 milliGRAM(s)/deciliter  glucagon  Injectable 1 milliGRAM(s) IntraMuscular once PRN Glucose LESS THAN 70 milligrams/deciliter  LORazepam   Injectable 2 milliGRAM(s) IV Push every 8 hours PRN Agitation  OLANZapine Injectable 5 milliGRAM(s) IntraMuscular every 6 hours PRN Severe Agitation      PHYSICAL EXAM:    I&O's Summary    Vital Signs Last 24 Hrs  T(C): 36.3 (04 Jan 2019 15:53), Max: 36.8 (04 Jan 2019 13:15)  T(F): 97.4 (04 Jan 2019 15:53), Max: 98.3 (04 Jan 2019 13:15)  HR: 100 (04 Jan 2019 15:53) (68 - 100)  BP: 115/82 (04 Jan 2019 15:53) (103/61 - 115/82)  BP(mean): --  RR: 18 (04 Jan 2019 15:53) (18 - 18)  SpO2: 99% (04 Jan 2019 15:53) (97% - 100%)  LABS:  CAPILLARY BLOOD GLUCOSE      POCT Blood Glucose.: 248 mg/dL (04 Jan 2019 12:23)  POCT Blood Glucose.: 280 mg/dL (04 Jan 2019 08:54)  POCT Blood Glucose.: 150 mg/dL (03 Jan 2019 22:04)  POCT Blood Glucose.: 238 mg/dL (03 Jan 2019 18:07)                      RADIOLOGY & ADDITIONAL TESTS:    Imaging Personally Reviewed:    Consultant(s) Notes Reviewed:      Care Discussed with Consultants/Other Providers: Patient is a 28y old  Male who presents with a chief complaint of Chest Pain, DKA (04 Jan 2019 09:41)      SUBJECTIVE / OVERNIGHT EVENTS: Pt with no complaints this am.     Review of Systems:     MEDICATIONS  (STANDING):  aspirin  chewable 81 milliGRAM(s) Oral daily  atorvastatin 80 milliGRAM(s) Oral at bedtime  benztropine 1 milliGRAM(s) Oral two times a day  clopidogrel Tablet 75 milliGRAM(s) Oral daily  dextrose 5%. 1000 milliLiter(s) (50 mL/Hr) IV Continuous <Continuous>  dextrose 50% Injectable 12.5 Gram(s) IV Push once  dextrose 50% Injectable 25 Gram(s) IV Push once  dextrose 50% Injectable 25 Gram(s) IV Push once  diVALproex  milliGRAM(s) Oral at bedtime  diVALproex  milliGRAM(s) Oral <User Schedule>  enoxaparin Injectable 40 milliGRAM(s) SubCutaneous every 24 hours  haloperidol     Tablet 20 milliGRAM(s) Oral at bedtime  insulin glargine Injectable (LANTUS) 26 Unit(s) SubCutaneous every morning  insulin lispro (HumaLOG) corrective regimen sliding scale   SubCutaneous at bedtime  insulin lispro (HumaLOG) corrective regimen sliding scale   SubCutaneous three times a day before meals  insulin lispro Injectable (HumaLOG) 14 Unit(s) SubCutaneous with breakfast  insulin lispro Injectable (HumaLOG) 14 Unit(s) SubCutaneous before dinner  insulin lispro Injectable (HumaLOG) 14 Unit(s) SubCutaneous before lunch  lisinopril 5 milliGRAM(s) Oral daily    MEDICATIONS  (PRN):  dextrose 40% Gel 15 Gram(s) Oral once PRN Blood Glucose LESS THAN 70 milliGRAM(s)/deciliter  glucagon  Injectable 1 milliGRAM(s) IntraMuscular once PRN Glucose LESS THAN 70 milligrams/deciliter  LORazepam   Injectable 2 milliGRAM(s) IV Push every 8 hours PRN Agitation  OLANZapine Injectable 5 milliGRAM(s) IntraMuscular every 6 hours PRN Severe Agitation      PHYSICAL EXAM:    I&O's Summary    Vital Signs Last 24 Hrs  T(C): 36.3 (04 Jan 2019 15:53), Max: 36.8 (04 Jan 2019 13:15)  T(F): 97.4 (04 Jan 2019 15:53), Max: 98.3 (04 Jan 2019 13:15)  HR: 100 (04 Jan 2019 15:53) (68 - 100)  BP: 115/82 (04 Jan 2019 15:53) (103/61 - 115/82)  BP(mean): --  RR: 18 (04 Jan 2019 15:53) (18 - 18)  SpO2: 99% (04 Jan 2019 15:53) (97% - 100%)  HYSICAL EXAM:  General: NAD walking around in his room   Cardiovascular: regular rhythm, Normal S1 S2, No JVD, No murmurs appreciated  Respiratory: Lungs clear to auscultation	  Gastrointestinal:  Soft, Non-tender, + BS	  Skin: No rashes, No ecchymoses, No cyanosis	  Extremities: Normal range of motion, No clubbing, cyanosis or edema  Neuro: follows commands   LABS:  CAPILLARY BLOOD GLUCOSE      POCT Blood Glucose.: 248 mg/dL (04 Jan 2019 12:23)  POCT Blood Glucose.: 280 mg/dL (04 Jan 2019 08:54)  POCT Blood Glucose.: 150 mg/dL (03 Jan 2019 22:04)  POCT Blood Glucose.: 238 mg/dL (03 Jan 2019 18:07)                      RADIOLOGY & ADDITIONAL TESTS:    Imaging Personally Reviewed:    Consultant(s) Notes Reviewed:      Care Discussed with Consultants/Other Providers:

## 2019-01-04 NOTE — PROGRESS NOTE ADULT - PROBLEM SELECTOR PLAN 1
Patient admitted for DKA. patient MUST ALWAYS receive basal insulin to prevent return of DKA.   For now, Lantus increased to 26 daily, please ensure pt gets this LANTUS every morning.   Will increase Humalog to 14/14/14 breakfast/lunch/dinner.   Continue low correction scale premeal and low bedtime scale.    -would advise that primary team be notified if pt refuses Lantus/insulin as he is otherwise at risk for DKA.    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    Patient will need basal bolus insulin at dc (to be administered at group home).  group home will need to be advised that pt WILL need insulin until his c-peptide status is reassessed otherwise he can still be at risk for DKA if he does not have insulin on board  He will need outpatient endocrine follow up [phone].

## 2019-01-05 PROCEDURE — 99233 SBSQ HOSP IP/OBS HIGH 50: CPT

## 2019-01-05 PROCEDURE — 99232 SBSQ HOSP IP/OBS MODERATE 35: CPT

## 2019-01-05 PROCEDURE — 99231 SBSQ HOSP IP/OBS SF/LOW 25: CPT

## 2019-01-05 RX ORDER — HALOPERIDOL DECANOATE 100 MG/ML
5 INJECTION INTRAMUSCULAR ONCE
Qty: 0 | Refills: 0 | Status: COMPLETED | OUTPATIENT
Start: 2019-01-05 | End: 2019-01-05

## 2019-01-05 RX ADMIN — HALOPERIDOL DECANOATE 5 MILLIGRAM(S): 100 INJECTION INTRAMUSCULAR at 13:47

## 2019-01-05 RX ADMIN — Medication 1 MILLIGRAM(S): at 23:06

## 2019-01-05 RX ADMIN — Medication 1 MILLIGRAM(S): at 11:18

## 2019-01-05 RX ADMIN — ENOXAPARIN SODIUM 40 MILLIGRAM(S): 100 INJECTION SUBCUTANEOUS at 11:18

## 2019-01-05 RX ADMIN — Medication 3: at 13:34

## 2019-01-05 RX ADMIN — HALOPERIDOL DECANOATE 20 MILLIGRAM(S): 100 INJECTION INTRAMUSCULAR at 23:10

## 2019-01-05 RX ADMIN — Medication 14 UNIT(S): at 13:33

## 2019-01-05 RX ADMIN — INSULIN GLARGINE 26 UNIT(S): 100 INJECTION, SOLUTION SUBCUTANEOUS at 09:38

## 2019-01-05 RX ADMIN — Medication 81 MILLIGRAM(S): at 11:18

## 2019-01-05 RX ADMIN — DIVALPROEX SODIUM 750 MILLIGRAM(S): 500 TABLET, DELAYED RELEASE ORAL at 08:05

## 2019-01-05 RX ADMIN — OLANZAPINE 5 MILLIGRAM(S): 15 TABLET, FILM COATED ORAL at 14:21

## 2019-01-05 RX ADMIN — ATORVASTATIN CALCIUM 80 MILLIGRAM(S): 80 TABLET, FILM COATED ORAL at 23:07

## 2019-01-05 RX ADMIN — CLOPIDOGREL BISULFATE 75 MILLIGRAM(S): 75 TABLET, FILM COATED ORAL at 11:18

## 2019-01-05 RX ADMIN — DIVALPROEX SODIUM 750 MILLIGRAM(S): 500 TABLET, DELAYED RELEASE ORAL at 23:07

## 2019-01-05 RX ADMIN — Medication 2 MILLIGRAM(S): at 11:18

## 2019-01-05 RX ADMIN — Medication 1: at 09:39

## 2019-01-05 RX ADMIN — Medication 14 UNIT(S): at 09:39

## 2019-01-05 RX ADMIN — LISINOPRIL 5 MILLIGRAM(S): 2.5 TABLET ORAL at 08:05

## 2019-01-05 NOTE — PROGRESS NOTE ADULT - PROBLEM SELECTOR PLAN 1
Patient admitted for DKA. patient MUST ALWAYS receive basal insulin to prevent return of DKA.   c/w - Lantus to 26 daily, patient gets the does every morning.   c/w -  Humalog to 14/12/12 breakfast/lunch/dinner.   Continue low correction scale premeal and low bedtime scale.    -would advise that primary team be notified if pt refuses Lantus/insulin as he is otherwise at risk for DKA.    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    Patient will need basal bolus insulin at NC (to be administered at group home).  He will need outpatient endocrine follow up [phone].   ***Pending Attending approval***

## 2019-01-05 NOTE — PROGRESS NOTE ADULT - SUBJECTIVE AND OBJECTIVE BOX
Patient is a 28y old  Male who presents with a chief complaint of Chest Pain, DKA (05 Jan 2019 14:01)        SUBJECTIVE / OVERNIGHT EVENTS:    Patient was aggrivated did not want to take his meds, security was called.      MEDICATIONS  (STANDING):  aspirin  chewable 81 milliGRAM(s) Oral daily  atorvastatin 80 milliGRAM(s) Oral at bedtime  benztropine 1 milliGRAM(s) Oral two times a day  clopidogrel Tablet 75 milliGRAM(s) Oral daily  dextrose 5%. 1000 milliLiter(s) (50 mL/Hr) IV Continuous <Continuous>  dextrose 50% Injectable 12.5 Gram(s) IV Push once  dextrose 50% Injectable 25 Gram(s) IV Push once  dextrose 50% Injectable 25 Gram(s) IV Push once  diVALproex  milliGRAM(s) Oral at bedtime  diVALproex  milliGRAM(s) Oral <User Schedule>  enoxaparin Injectable 40 milliGRAM(s) SubCutaneous every 24 hours  haloperidol     Tablet 20 milliGRAM(s) Oral at bedtime  insulin glargine Injectable (LANTUS) 26 Unit(s) SubCutaneous every morning  insulin lispro (HumaLOG) corrective regimen sliding scale   SubCutaneous at bedtime  insulin lispro (HumaLOG) corrective regimen sliding scale   SubCutaneous three times a day before meals  insulin lispro Injectable (HumaLOG) 14 Unit(s) SubCutaneous with breakfast  insulin lispro Injectable (HumaLOG) 14 Unit(s) SubCutaneous before dinner  insulin lispro Injectable (HumaLOG) 14 Unit(s) SubCutaneous before lunch  lisinopril 5 milliGRAM(s) Oral daily    MEDICATIONS  (PRN):  dextrose 40% Gel 15 Gram(s) Oral once PRN Blood Glucose LESS THAN 70 milliGRAM(s)/deciliter  glucagon  Injectable 1 milliGRAM(s) IntraMuscular once PRN Glucose LESS THAN 70 milligrams/deciliter  LORazepam   Injectable 2 milliGRAM(s) IV Push every 8 hours PRN Agitation  OLANZapine Injectable 5 milliGRAM(s) IntraMuscular every 6 hours PRN Severe Agitation        CAPILLARY BLOOD GLUCOSE      POCT Blood Glucose.: 291 mg/dL (05 Jan 2019 13:15)  POCT Blood Glucose.: 177 mg/dL (05 Jan 2019 09:39)  POCT Blood Glucose.: 102 mg/dL (04 Jan 2019 22:17)  POCT Blood Glucose.: 337 mg/dL (04 Jan 2019 19:01)    I&O's Summary  Vital Signs Last 24 Hrs  T(C): 36.4 (05 Jan 2019 12:57), Max: 36.6 (05 Jan 2019 08:00)  T(F): 97.5 (05 Jan 2019 12:57), Max: 97.9 (05 Jan 2019 08:00)  HR: 98 (05 Jan 2019 12:57) (84 - 100)  BP: 113/80 (05 Jan 2019 12:57) (94/55 - 115/82)  BP(mean): --  RR: 18 (05 Jan 2019 12:57) (18 - 20)  SpO2: 99% (05 Jan 2019 12:57) (99% - 100%)    PHYSICAL EXAM  GENERAL: NAD, well-developed  HEAD:  Atraumatic, Normocephalic  EYES: EOMI, PERRLA, conjunctiva and sclera clear  NECK: Supple, No JVD  CHEST/LUNG: Clear to auscultation bilaterally; No wheeze  HEART: Regular rate and rhythm; No murmurs, rubs, or gallops  ABDOMEN: Soft, Nontender, Nondistended; Bowel sounds present  EXTREMITIES:  2+ Peripheral Pulses, No clubbing, cyanosis, or edema  PSYCH: AAOx3  SKIN: No rashes or lesions    LABS:                    RADIOLOGY & ADDITIONAL TESTS:    Imaging Personally Reviewed:  Consultant(s) Notes Reviewed:  Endo, Psych  Care Discussed with Consultants/Other Providers:

## 2019-01-05 NOTE — PROGRESS NOTE BEHAVIORAL HEALTH - NSBHFUPINTERVALHXFT_PSY_A_CORE
Pt seen and chart reviewed. He is on 1:1. He received Ativan 2mg IV at 15:48 and Zyprexa 5mg IM at 19:09 for agitation. He is calm on interview, though not cooperative with interview. He is seen eating breakfast. States he is staying in the hospital forever. Reports vague suicidal ideation and vague homicidal ideation, though is vague, made numerous references to buying a gun, but did not elaborate. He would not participate with ROS. No acute safety concerns at this time.     Of note:  last 12/27/18, he had been agitated/aggressive that morning but the agitation had been attributed to likely poor frustration tolerance to finger sticks and insulin injections. Last 12/28/18, the Pt also displayed intermittent bouts of agitation, poor frustration tolerance again within the context of insulin administration and finger sticks.

## 2019-01-05 NOTE — PROGRESS NOTE ADULT - PROBLEM SELECTOR PLAN 3
- see above  - will need to continue with basal/bolus insulin as per endocrine, given risk of developing DKA (may have ketosis prone DM2 which can be seen in setting of anti-psychotic use).  - F/U endocrine recs. Provider to be notified in case patient refuses insulin.

## 2019-01-05 NOTE — PROGRESS NOTE ADULT - PROBLEM SELECTOR PLAN 2
Resolved. Initially occurred likely in setting of poorly uncontrolled DM and non-compliance with diet. no clear infectious source. UA negative. CXR clear.     FS still not controlled.  -Endocrine recs appreciated Lantus increased to 26 daily. Pt should get  this every morning.   -Will increase Humalog to 14/14/14 breakfast/lunch/dinner.

## 2019-01-05 NOTE — PROGRESS NOTE BEHAVIORAL HEALTH - CASE SUMMARY
intermittent agitation continues, VPA level 76.3 likely to be OK to go up on depakote to 750mg BID which may help with agitation however slighlty. Will continue to follow, patient's actions likely 2/2 chronic impulse control deficit which is unlikely to be altered by meds alone.
Pt seen with [facility] Dr. Cruz.  Agree with findings and recommendations as above.
28/M with past psych hx of: bipolar affective disorder, Intermittent Explosive Disorder, intellectual disability (IQ=40), multiple inpatient psychiatric admissions, admitted for NSTEMI and DKA. Pt continues to have periods of agitation and received two prns yesterday. Pt also endorses SI/HI towards staff with no clear I/P. Will continue current med mgmt and CO 1:1. Check VPA level on 1/9.

## 2019-01-05 NOTE — PROGRESS NOTE ADULT - PROBLEM SELECTOR PLAN 1
-c/w depakote 750mg PO AM and Depakote 750mg PO qhs  -c/w haldol 20mg qhs and cogentin 1mg qd   -c/w Zyprexa 5mg PRN  -will continue to appreciate psych recs   -c/w constant observation  - Monitor QTC -c/w depakote 750mg PO AM and Depakote 750mg PO qhs  -c/w haldol 20mg qhs and cogentin 1mg qd   -c/w Zyprexa 5mg PRN  -will continue to appreciate psych recs   -c/w constant observation  - Monitor QTC  - Check valproic acid level on 1/9.

## 2019-01-05 NOTE — PROGRESS NOTE ADULT - SUBJECTIVE AND OBJECTIVE BOX
Chief Complaint: DM2     History: Pt. refusing insulin and becoming very aggressive security was called    MEDICATIONS  (STANDING):  aspirin  chewable 81 milliGRAM(s) Oral daily  atorvastatin 80 milliGRAM(s) Oral at bedtime  benztropine 1 milliGRAM(s) Oral two times a day  clopidogrel Tablet 75 milliGRAM(s) Oral daily  dextrose 5%. 1000 milliLiter(s) (50 mL/Hr) IV Continuous <Continuous>  dextrose 50% Injectable 12.5 Gram(s) IV Push once  dextrose 50% Injectable 25 Gram(s) IV Push once  dextrose 50% Injectable 25 Gram(s) IV Push once  diVALproex  milliGRAM(s) Oral at bedtime  diVALproex  milliGRAM(s) Oral <User Schedule>  enoxaparin Injectable 40 milliGRAM(s) SubCutaneous every 24 hours  haloperidol     Tablet 20 milliGRAM(s) Oral at bedtime  insulin glargine Injectable (LANTUS) 26 Unit(s) SubCutaneous every morning  insulin lispro (HumaLOG) corrective regimen sliding scale   SubCutaneous at bedtime  insulin lispro (HumaLOG) corrective regimen sliding scale   SubCutaneous three times a day before meals  insulin lispro Injectable (HumaLOG) 14 Unit(s) SubCutaneous with breakfast  insulin lispro Injectable (HumaLOG) 14 Unit(s) SubCutaneous before dinner  insulin lispro Injectable (HumaLOG) 14 Unit(s) SubCutaneous before lunch  lisinopril 5 milliGRAM(s) Oral daily    MEDICATIONS  (PRN):  dextrose 40% Gel 15 Gram(s) Oral once PRN Blood Glucose LESS THAN 70 milliGRAM(s)/deciliter  glucagon  Injectable 1 milliGRAM(s) IntraMuscular once PRN Glucose LESS THAN 70 milligrams/deciliter  LORazepam   Injectable 2 milliGRAM(s) IV Push every 8 hours PRN Agitation  OLANZapine Injectable 5 milliGRAM(s) IntraMuscular every 6 hours PRN Severe Agitation      Allergies    No Known Allergies    Intolerances      Review of Systems:      UNABLE TO OBTAIN    PHYSICAL EXAM:  VITALS: T(C): 36.4 (01-05-19 @ 12:57)  T(F): 97.5 (01-05-19 @ 12:57), Max: 97.9 (01-05-19 @ 08:00)  HR: 98 (01-05-19 @ 12:57) (84 - 100)  BP: 113/80 (01-05-19 @ 12:57) (94/55 - 115/82)  RR:  (18 - 20)  SpO2:  (99% - 100%)  Wt(kg): --    UNABLE TO OBTAIN    CAPILLARY BLOOD GLUCOSE      POCT Blood Glucose.: 291 mg/dL (05 Jan 2019 13:15)  POCT Blood Glucose.: 177 mg/dL (05 Jan 2019 09:39)  POCT Blood Glucose.: 102 mg/dL (04 Jan 2019 22:17)  POCT Blood Glucose.: 337 mg/dL (04 Jan 2019 19:01)                  Thyroid Function Tests:      Hemoglobin A1C, Whole Blood: 13.8 % <H> [4.0 - 5.6] (12-24-18 @ 07:15) Chief Complaint: DM2     History: Pt. refusing insulin and becoming very aggressive security was called.  He did received his basal insulin at the [facility].     MEDICATIONS  (STANDING):  aspirin  chewable 81 milliGRAM(s) Oral daily  atorvastatin 80 milliGRAM(s) Oral at bedtime  benztropine 1 milliGRAM(s) Oral two times a day  clopidogrel Tablet 75 milliGRAM(s) Oral daily  diVALproex  milliGRAM(s) Oral at bedtime  diVALproex  milliGRAM(s) Oral <User Schedule>  enoxaparin Injectable 40 milliGRAM(s) SubCutaneous every 24 hours  haloperidol     Tablet 20 milliGRAM(s) Oral at bedtime  insulin glargine Injectable (LANTUS) 26 Unit(s) SubCutaneous every morning  insulin lispro (HumaLOG) corrective regimen sliding scale   SubCutaneous at bedtime  insulin lispro (HumaLOG) corrective regimen sliding scale   SubCutaneous three times a day before meals  insulin lispro Injectable (HumaLOG) 14 Unit(s) SubCutaneous with breakfast  insulin lispro Injectable (HumaLOG) 14 Unit(s) SubCutaneous before dinner  insulin lispro Injectable (HumaLOG) 14 Unit(s) SubCutaneous before lunch  lisinopril 5 milliGRAM(s) Oral daily    MEDICATIONS  (PRN):  dextrose 40% Gel 15 Gram(s) Oral once PRN Blood Glucose LESS THAN 70 milliGRAM(s)/deciliter  glucagon  Injectable 1 milliGRAM(s) IntraMuscular once PRN Glucose LESS THAN 70 milligrams/deciliter  LORazepam   Injectable 2 milliGRAM(s) IV Push every 8 hours PRN Agitation  OLANZapine Injectable 5 milliGRAM(s) IntraMuscular every 6 hours PRN Severe Agitation      Allergies    No Known Allergies    Intolerances      Review of Systems:      UNABLE TO OBTAIN    PHYSICAL EXAM:  VITALS: T(C): 36.4 (01-05-19 @ 12:57)  T(F): 97.5 (01-05-19 @ 12:57), Max: 97.9 (01-05-19 @ 08:00)  HR: 98 (01-05-19 @ 12:57) (84 - 100)  BP: 113/80 (01-05-19 @ 12:57) (94/55 - 115/82)  RR:  (18 - 20)  SpO2:  (99% - 100%)  Wt(kg): --    UNABLE TO OBTAIN    CAPILLARY BLOOD GLUCOSE      POCT Blood Glucose.: 291 mg/dL (05 Jan 2019 13:15)  POCT Blood Glucose.: 177 mg/dL (05 Jan 2019 09:39)  POCT Blood Glucose.: 102 mg/dL (04 Jan 2019 22:17)  POCT Blood Glucose.: 337 mg/dL (04 Jan 2019 19:01)                  Thyroid Function Tests:      Hemoglobin A1C, Whole Blood: 13.8 % <H> [4.0 - 5.6] (12-24-18 @ 07:15)

## 2019-01-05 NOTE — PROGRESS NOTE BEHAVIORAL HEALTH - SUMMARY
28/M with past psych hx of: bipolar affective disorder, Intermittent Explosive Disorder, intellectual disability (IQ=40), multiple inpatient psychiatric admissions. Has long hx of aggression toward family with chronic poor frustration tolerance. Pt has no documented prior SA and no substance abuse hx. On follow up with Psychiatry for continued mgt of his agitation.     Pt had been showing bouts of agitation, low frustration tolerance. at present, verbalized SI/HI though is vague and unclear about intent/plan. Made mention of gun, but patient is in inpatient setting and has no access to weapons at this time.  No signs/symptoms suggestive of delirium. Not acutely psychotic. Despite patient answering "yes" when asked about HI/SI, there are no acute safety concerns at this time as patient is on 1:1, is not showing evidence of self-injury, and cannot access weapons while on the medical unit.     Recommendations:   -continue with Haldol 20mg HS PO  -continue Depakote 750mg po qAM, and qHS   -need to have repeat Depakote level on 1/9/2019  -[facility] psych will continue to follow     PRNs:   Ativan 2mg IVP q8Hrs PRN for agitation (hold for sedation)  Zyprexa 5mg IM q6Hrs PRN for severe agitation      needs continued 1:1 constant ob

## 2019-01-06 PROCEDURE — 99232 SBSQ HOSP IP/OBS MODERATE 35: CPT

## 2019-01-06 PROCEDURE — 99233 SBSQ HOSP IP/OBS HIGH 50: CPT

## 2019-01-06 RX ADMIN — Medication 3: at 17:46

## 2019-01-06 RX ADMIN — DIVALPROEX SODIUM 750 MILLIGRAM(S): 500 TABLET, DELAYED RELEASE ORAL at 23:06

## 2019-01-06 RX ADMIN — Medication 1 MILLIGRAM(S): at 11:07

## 2019-01-06 RX ADMIN — Medication 14 UNIT(S): at 17:46

## 2019-01-06 RX ADMIN — Medication 1: at 22:09

## 2019-01-06 RX ADMIN — Medication 1 MILLIGRAM(S): at 23:06

## 2019-01-06 RX ADMIN — Medication 81 MILLIGRAM(S): at 11:07

## 2019-01-06 RX ADMIN — Medication 14 UNIT(S): at 13:18

## 2019-01-06 RX ADMIN — Medication 2: at 09:02

## 2019-01-06 RX ADMIN — HALOPERIDOL DECANOATE 20 MILLIGRAM(S): 100 INJECTION INTRAMUSCULAR at 23:06

## 2019-01-06 RX ADMIN — ENOXAPARIN SODIUM 40 MILLIGRAM(S): 100 INJECTION SUBCUTANEOUS at 11:07

## 2019-01-06 RX ADMIN — DIVALPROEX SODIUM 750 MILLIGRAM(S): 500 TABLET, DELAYED RELEASE ORAL at 08:09

## 2019-01-06 RX ADMIN — Medication 14 UNIT(S): at 09:02

## 2019-01-06 RX ADMIN — ATORVASTATIN CALCIUM 80 MILLIGRAM(S): 80 TABLET, FILM COATED ORAL at 23:06

## 2019-01-06 RX ADMIN — INSULIN GLARGINE 26 UNIT(S): 100 INJECTION, SOLUTION SUBCUTANEOUS at 09:02

## 2019-01-06 RX ADMIN — CLOPIDOGREL BISULFATE 75 MILLIGRAM(S): 75 TABLET, FILM COATED ORAL at 11:07

## 2019-01-06 NOTE — PROGRESS NOTE BEHAVIORAL HEALTH - NSBHCONSULTMEDS_PSY_A_CORE FT
VA level: 38.8. Considering ongoing agitation, will recommend increasing dose of Depakote to 500mg q am + 750mg q hs po. Check VA level again on Monday.   Continue Haldol 20mg q hs po.   Unable to reach mother Crista for coordination of care.
see above
VA level: 38.8. Considering ongoing agitation, will recommend increasing dose of Depakote to 500mg q am + 750mg q hs po. Check VA level again on Monday.   Continue Haldol 20mg q hs po.   Unable to reach mother Crista for coordination of care.
see above
Continue Depakote 500mg bid po- check VA level tomorrow, and will consider increasing dose to target agitation, impulsivity  Continue cogentin 1mg bid po  Continue Haldol 20mg q hs po for now. Will need to discuss with family about a CORTEZ  Check CPK
Continue depakote 500mg bid po  Continue cogentin 1mg bid po  Continue Haldol 20mg q hs po  Check VA levels  Check CPK

## 2019-01-06 NOTE — PROGRESS NOTE ADULT - SUBJECTIVE AND OBJECTIVE BOX
Patient is a 28y old  Male who presents with a chief complaint of Chest Pain, DKA (05 Jan 2019 14:24)        SUBJECTIVE / OVERNIGHT EVENTS:    No acute events o/n. No complaints. Would like to go home if possible.     MEDICATIONS  (STANDING):  aspirin  chewable 81 milliGRAM(s) Oral daily  atorvastatin 80 milliGRAM(s) Oral at bedtime  benztropine 1 milliGRAM(s) Oral two times a day  clopidogrel Tablet 75 milliGRAM(s) Oral daily  dextrose 5%. 1000 milliLiter(s) (50 mL/Hr) IV Continuous <Continuous>  dextrose 50% Injectable 12.5 Gram(s) IV Push once  dextrose 50% Injectable 25 Gram(s) IV Push once  dextrose 50% Injectable 25 Gram(s) IV Push once  diVALproex  milliGRAM(s) Oral at bedtime  diVALproex  milliGRAM(s) Oral <User Schedule>  enoxaparin Injectable 40 milliGRAM(s) SubCutaneous every 24 hours  haloperidol     Tablet 20 milliGRAM(s) Oral at bedtime  insulin glargine Injectable (LANTUS) 26 Unit(s) SubCutaneous every morning  insulin lispro (HumaLOG) corrective regimen sliding scale   SubCutaneous at bedtime  insulin lispro (HumaLOG) corrective regimen sliding scale   SubCutaneous three times a day before meals  insulin lispro Injectable (HumaLOG) 14 Unit(s) SubCutaneous with breakfast  insulin lispro Injectable (HumaLOG) 14 Unit(s) SubCutaneous before dinner  insulin lispro Injectable (HumaLOG) 14 Unit(s) SubCutaneous before lunch  lisinopril 5 milliGRAM(s) Oral daily    MEDICATIONS  (PRN):  dextrose 40% Gel 15 Gram(s) Oral once PRN Blood Glucose LESS THAN 70 milliGRAM(s)/deciliter  glucagon  Injectable 1 milliGRAM(s) IntraMuscular once PRN Glucose LESS THAN 70 milligrams/deciliter  LORazepam   Injectable 2 milliGRAM(s) IV Push every 8 hours PRN Agitation  OLANZapine Injectable 5 milliGRAM(s) IntraMuscular every 6 hours PRN Severe Agitation        CAPILLARY BLOOD GLUCOSE      POCT Blood Glucose.: 233 mg/dL (06 Jan 2019 08:57)  POCT Blood Glucose.: 241 mg/dL (05 Jan 2019 21:54)  POCT Blood Glucose.: 144 mg/dL (05 Jan 2019 19:55)  POCT Blood Glucose.: 291 mg/dL (05 Jan 2019 13:15)    I&O's Summary  Vital Signs Last 24 Hrs  T(C): 36.7 (06 Jan 2019 08:07), Max: 36.7 (05 Jan 2019 23:04)  T(F): 98 (06 Jan 2019 08:07), Max: 98 (05 Jan 2019 23:04)  HR: 81 (06 Jan 2019 08:07) (81 - 88)  BP: 98/64 (06 Jan 2019 08:07) (98/64 - 117/83)  BP(mean): --  RR: 18 (06 Jan 2019 08:07) (18 - 18)  SpO2: 100% (06 Jan 2019 08:07) (98% - 100%)    PHYSICAL EXAM  GENERAL: NAD, well-developed  HEAD:  Atraumatic, Normocephalic  EYES: EOMI, PERRLA, conjunctiva and sclera clear  NECK: Supple, No JVD  CHEST/LUNG: Clear to auscultation bilaterally; No wheeze  HEART: Regular rate and rhythm; No murmurs, rubs, or gallops  ABDOMEN: Soft, Nontender, Nondistended; Bowel sounds present  EXTREMITIES:  2+ Peripheral Pulses, No clubbing, cyanosis, or edema  PSYCH: AAOx3  SKIN: No rashes or lesions    LABS:        RADIOLOGY & ADDITIONAL TESTS:    Imaging Personally Reviewed:  Consultant(s) Notes Reviewed:  Endo, Psych  Care Discussed with Consultants/Other Providers:

## 2019-01-06 NOTE — PROGRESS NOTE ADULT - SUBJECTIVE AND OBJECTIVE BOX
Chief Complaint: DM2     History: pt. still making it very difficult to give insulin dose, informed RN if not able to give dose to inform primary team.     MEDICATIONS  (STANDING):  aspirin  chewable 81 milliGRAM(s) Oral daily  atorvastatin 80 milliGRAM(s) Oral at bedtime  benztropine 1 milliGRAM(s) Oral two times a day  clopidogrel Tablet 75 milliGRAM(s) Oral daily  dextrose 5%. 1000 milliLiter(s) (50 mL/Hr) IV Continuous <Continuous>  dextrose 50% Injectable 12.5 Gram(s) IV Push once  dextrose 50% Injectable 25 Gram(s) IV Push once  dextrose 50% Injectable 25 Gram(s) IV Push once  diVALproex  milliGRAM(s) Oral at bedtime  diVALproex  milliGRAM(s) Oral <User Schedule>  enoxaparin Injectable 40 milliGRAM(s) SubCutaneous every 24 hours  haloperidol     Tablet 20 milliGRAM(s) Oral at bedtime  insulin glargine Injectable (LANTUS) 26 Unit(s) SubCutaneous every morning  insulin lispro (HumaLOG) corrective regimen sliding scale   SubCutaneous at bedtime  insulin lispro (HumaLOG) corrective regimen sliding scale   SubCutaneous three times a day before meals  insulin lispro Injectable (HumaLOG) 14 Unit(s) SubCutaneous with breakfast  insulin lispro Injectable (HumaLOG) 14 Unit(s) SubCutaneous before dinner  insulin lispro Injectable (HumaLOG) 14 Unit(s) SubCutaneous before lunch  lisinopril 5 milliGRAM(s) Oral daily    MEDICATIONS  (PRN):  dextrose 40% Gel 15 Gram(s) Oral once PRN Blood Glucose LESS THAN 70 milliGRAM(s)/deciliter  glucagon  Injectable 1 milliGRAM(s) IntraMuscular once PRN Glucose LESS THAN 70 milligrams/deciliter  LORazepam   Injectable 2 milliGRAM(s) IV Push every 8 hours PRN Agitation  OLANZapine Injectable 5 milliGRAM(s) IntraMuscular every 6 hours PRN Severe Agitation      Allergies    No Known Allergies    Intolerances      Review of Systems:  Constitutional: No fever  Eyes: No blurry vision  Neuro: No tremors  HEENT: No pain      PHYSICAL EXAM:  VITALS: T(C): 36.5 (01-06-19 @ 13:12)  T(F): 97.7 (01-06-19 @ 13:12), Max: 98 (01-05-19 @ 23:04)  HR: 99 (01-06-19 @ 13:12) (81 - 99)  BP: 107/79 (01-06-19 @ 13:12) (98/64 - 117/83)  RR:  (18 - 18)  SpO2:  (98% - 100%)  Wt(kg): --  GENERAL: NAD, well-groomed, well-developed  EYES: No proptosis, no lid lag, anicteric  HEENT:  Atraumatic, Normocephalic, moist mucous membranes        CAPILLARY BLOOD GLUCOSE      POCT Blood Glucose.: 140 mg/dL (06 Jan 2019 13:01)  POCT Blood Glucose.: 233 mg/dL (06 Jan 2019 08:57)  POCT Blood Glucose.: 241 mg/dL (05 Jan 2019 21:54)  POCT Blood Glucose.: 144 mg/dL (05 Jan 2019 19:55)                  Thyroid Function Tests:      Hemoglobin A1C, Whole Blood: 13.8 % <H> [4.0 - 5.6] (12-24-18 @ 07:15)

## 2019-01-06 NOTE — PROGRESS NOTE BEHAVIORAL HEALTH - NSBHCHARTREVIEWVS_PSY_A_CORE FT
Vital Signs Last 24 Hrs  T(C): 36.6 (24 Dec 2018 14:04), Max: 36.9 (23 Dec 2018 21:10)  T(F): 97.8 (24 Dec 2018 14:04), Max: 98.4 (23 Dec 2018 21:10)  HR: 82 (24 Dec 2018 14:04) (69 - 96)  BP: 115/75 (24 Dec 2018 14:04) (99/66 - 125/99)  BP(mean): --  RR: 16 (24 Dec 2018 14:04) (16 - 20)  SpO2: 100% (24 Dec 2018 14:04) (97% - 100%)
ICU Vital Signs Last 24 Hrs  T(C): 36.4 (01 Jan 2019 06:34), Max: 36.8 (01 Jan 2019 06:07)  T(F): 97.6 (01 Jan 2019 06:34), Max: 98.3 (01 Jan 2019 06:07)  HR: 97 (01 Jan 2019 06:34) (90 - 114)  BP: 120/83 (01 Jan 2019 06:34) (96/62 - 134/81)  BP(mean): --  ABP: --  ABP(mean): --  RR: 16 (01 Jan 2019 06:34) (16 - 18)  SpO2: 99% (01 Jan 2019 06:34) (97% - 99%)
Vital Signs Last 24 Hrs  T(C): 36.7 (03 Jan 2019 06:46), Max: 36.7 (03 Jan 2019 06:46)  T(F): 98.1 (03 Jan 2019 06:46), Max: 98.1 (03 Jan 2019 06:46)  HR: 79 (03 Jan 2019 06:46) (79 - 102)  BP: 103/64 (03 Jan 2019 06:46) (103/64 - 125/83)  BP(mean): --  RR: 18 (03 Jan 2019 06:46) (18 - 18)  SpO2: 100% (03 Jan 2019 06:46) (99% - 100%)
Vital Signs Last 24 Hrs  T(C): 36.7 (06 Jan 2019 08:07), Max: 36.7 (05 Jan 2019 23:04)  T(F): 98 (06 Jan 2019 08:07), Max: 98 (05 Jan 2019 23:04)  HR: 81 (06 Jan 2019 08:07) (81 - 98)  BP: 98/64 (06 Jan 2019 08:07) (98/64 - 117/83)  BP(mean): --  RR: 18 (06 Jan 2019 08:07) (18 - 18)  SpO2: 100% (06 Jan 2019 08:07) (98% - 100%)
T(C): 36.6 (01-05-19 @ 08:00)  T(F): 97.9 (01-05-19 @ 08:00), Max: 98.3 (01-04-19 @ 13:15)  HR: 84 (01-05-19 @ 08:00) (84 - 100)  BP: 114/74 (01-05-19 @ 08:00) (94/55 - 115/82)  RR:  (18 - 20)  SpO2:  (99% - 100%)  Wt(kg): --
Vital Signs Last 24 Hrs  T(C): 36.7 (27 Dec 2018 12:43), Max: 37.3 (27 Dec 2018 10:49)  T(F): 98 (27 Dec 2018 12:43), Max: 99.1 (27 Dec 2018 10:49)  HR: 109 (27 Dec 2018 12:43) (94 - 120)  BP: 93/53 (27 Dec 2018 12:43) (93/53 - 154/51)  BP(mean): --  RR: 16 (27 Dec 2018 12:43) (16 - 18)  SpO2: 100% (27 Dec 2018 12:43) (96% - 100%)
Vital Signs Last 24 Hrs  T(C): 36.4 (28 Dec 2018 05:36), Max: 36.6 (27 Dec 2018 21:39)  T(F): 97.6 (28 Dec 2018 05:36), Max: 97.8 (27 Dec 2018 21:39)  HR: 72 (28 Dec 2018 05:36) (66 - 94)  BP: 112/76 (28 Dec 2018 05:36) (97/50 - 112/76)  BP(mean): --  RR: 18 (28 Dec 2018 05:36) (16 - 18)  SpO2: 85% (28 Dec 2018 05:36) (85% - 100%)

## 2019-01-06 NOTE — PROGRESS NOTE BEHAVIORAL HEALTH - SUMMARY
28/M with past psych hx of: bipolar affective disorder, Intermittent Explosive Disorder, intellectual disability (IQ=40), multiple inpatient psychiatric admissions. Has long hx of aggression toward family with chronic poor frustration tolerance. Pt has no documented prior SA and no substance abuse hx. On follow up with Psychiatry for continued management of his agitation.     Pt had been showing bouts of agitation, low frustration tolerance. at present, verbalized SI/HI though is vague and unclear about intent/plan. No signs/symptoms suggestive of delirium. Not acutely psychotic. Despite patient answering "yes" when asked about HI/SI, there are no acute safety concerns at this time as patient is on 1:1, is not showing evidence of self-injury.    Recommendations:   -continue with Haldol 20mg HS PO  -continue Depakote 750mg po qAM, and qHS   -need to have repeat Depakote level on 1/9/2019  -[facility] psych will continue to follow     PRNs:   Ativan 2mg IVP q8Hrs PRN for agitation (hold for sedation)  Zyprexa 5mg IM q6Hrs PRN for severe agitation      needs continued 1:1 constant ob

## 2019-01-06 NOTE — PROGRESS NOTE ADULT - PROBLEM SELECTOR PLAN 1
-c/w depakote 750mg PO AM and Depakote 750mg PO qhs  -c/w haldol 20mg qhs and cogentin 1mg qd   -c/w Zyprexa 5mg PRN  -will continue to appreciate psych recs   -c/w constant observation  - Monitor QTC  - Check valproic acid level on 1/9.

## 2019-01-06 NOTE — PROGRESS NOTE ADULT - PROBLEM SELECTOR PLAN 1
Patient admitted for DKA. patient MUST ALWAYS receive basal insulin to prevent return of DKA.   c/w - Lantus to 26 daily, patient gets the does every morning.   c/w -  Humalog to 14/12/12 breakfast/lunch/dinner.   Continue low correction scale premeal and low bedtime scale.    -would advise that primary team be notified if pt refuses Lantus/insulin as he is otherwise at risk for DKA.    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    Patient will need basal bolus insulin at [facility] (to be administered at group home).  He will need outpatient endocrine follow up [phone].

## 2019-01-06 NOTE — PROGRESS NOTE BEHAVIORAL HEALTH - NSBHPTASSESSDT_PSY_A_CORE
01-Jan-2019 11:32
03-Jan-2019 11:00
06-Jan-2019 12:39
27-Dec-2018 14:00
28-Dec-2018 13:00
05-Jan-2019 10:42
24-Dec-2018 16:45

## 2019-01-06 NOTE — PROGRESS NOTE BEHAVIORAL HEALTH - NSBHCHARTREVIEWINVESTIGATE_PSY_A_CORE FT
< from: 12 Lead ECG (12.23.18 @ 05:31) >      Ventricular Rate 96 BPM    Atrial Rate 96 BPM    P-R Interval 124 ms    QRS Duration 96 ms    Q-T Interval 352 ms    QTC Calculation(Bezet) 444 ms    P Axis 68 degrees    R Axis 62 degrees    T Axis 93 degrees    Diagnosis Line Normal sinus rhythm  Septal infarct , age undetermined  T wave abnormality, consider inferior ischemia  Abnormal ECG    < end of copied text >
< from: 12 Lead ECG (12.23.18 @ 05:31) >      Ventricular Rate 96 BPM    Atrial Rate 96 BPM    P-R Interval 124 ms    QRS Duration 96 ms    Q-T Interval 352 ms    QTC Calculation(Bezet) 444 ms    P Axis 68 degrees    R Axis 62 degrees    T Axis 93 degrees    Diagnosis Line Normal sinus rhythm  Septal infarct , age undetermined  T wave abnormality, consider inferior ischemia  Abnormal ECG    < end of copied text >

## 2019-01-06 NOTE — PROGRESS NOTE BEHAVIORAL HEALTH - NSBHFUPINTERVALHXFT_PSY_A_CORE
Pt seen and chart reviewed. He is on 1:1. He received Ativan 2mg and Zyprexa 5mg IM  for agitation yesterday but is calm and cooperative on interview. He is seen eating breakfast.  No acute safety concerns at this time.

## 2019-01-06 NOTE — PROGRESS NOTE BEHAVIORAL HEALTH - PRIMARY DX
Bipolar affective disorder, current episode mixed, current episode severity unspecified
Bipolar affective disorder, current episode mixed, current episode severity unspecified
Intellectual disability
Intellectual disability
Bipolar disorder, unspecified
Intellectual disability
Schizoaffective disorder, bipolar type

## 2019-01-06 NOTE — PROGRESS NOTE BEHAVIORAL HEALTH - NSBHCONSULTMEDPRNREASON_PSY_A_CORE
severe agitation...

## 2019-01-06 NOTE — PROGRESS NOTE BEHAVIORAL HEALTH - NSBHCONSULTMEDSEVERE_PSY_A_CORE FT
zyprexa 5mg q 6hrs prn im- hold for qtc >=500  DO NOT GIVE PARENTERAL ATIVAN ALONG WITH ZYPREXA IM AS INCREASED RISK FOR RESPIRATORY DEPRESSION.
see above
zyprexa 5mg q 6hrs prn im- hold for qtc >=500  DO NOT GIVE PARENTERAL ATIVAN ALONG WITH ZYPREXA IM AS INCREASED RISK FOR RESPIRATORY DEPRESSION.
see above
ZYPREXA 5MG Q 6HRS PRN IM- HOLD FOR QTC>=500
ZYPREXA 5MG Q 6HRS PRN IM- HOLD FOR QTC>=500

## 2019-01-06 NOTE — PROGRESS NOTE BEHAVIORAL HEALTH - OTHER
superficially cooperative by hx: impaired; remains unpredictable occasionally mumbling incomprehensible words concrete unable to be assessed

## 2019-01-06 NOTE — PROGRESS NOTE BEHAVIORAL HEALTH - LANGUAGE
No abnormalities noted
Other
Other
Impaired repetition
Other

## 2019-01-06 NOTE — PROGRESS NOTE ADULT - ATTENDING COMMENTS
Given overall preserved LV function, this is likely a type II event. Would not pursue ischemic w/u at this point, but will need outpt f/u.
NSTEMI, type II, in setting of DKA.    Agree with echo, but in absence of significant LV dysfunction, would not pursue cath.
Michelle Cantu MD  Pager [phone] ([facility])/ [phone] ([facility]) [please provide 10 digit call back number]  Nights and weekends: [phone]  Please note that this patient may be followed by a different provider tomorrow. If no answer or after hours, please contact [phone].  For final dc reccomendations, please call [phone] or page the endocrine fellow on call.
Michelle Cantu MD  Pager [phone] ([facility])/ [phone] ([facility]) [please provide 10 digit call back number]  Nights and weekends: [phone]  Please note that this patient may be followed by a different provider tomorrow. If no answer or after hours, please contact [phone].  For final dc reccomendations, please call [phone] or page the endocrine fellow on call.
Michelle Cantu MD  Pager [phone] ([facility])/ [phone] ([facility]) [please provide 10 digit call back number]  Nights and weekends: [phone]  Please note that this patient may be followed by a different provider tomorrow. If no answer or after hours, please contact [phone].  For final dc reccomendations, please call [phone] or page the endocrine fellow on call.
Agree with assessment and plan as above by Tamara Johnson (NP).  Reviewed all pertinent labs, glucose values, and imaging studies. Modifications made as indicated above.     Meliton Watkins MD  [phone]
Patient seen and case discussed with Tamara Johnson (VALERIE).
Cardiology input appreciated. Will f/u Endo rec.
Will f/u Cardiology and Endo rec.
Dae Hyeon Kim MD-PGY4  Chief Resident  Department of Internal Medicine  Pager [phone]
Dae Hyeon Kim MD-PGY4  Chief Resident  Department of Internal Medicine  Pager [phone]
Improve clinically.
Seen and examined, care d/w resident.    Patient was fine this am, no severely agitated threatening staff, attempting to bite them, no triggering event just happened out of no where.  Patient not redirectable.  Security had to be called.  Given prns, now in restraints pending effect of medication to ensure safety of staff.  Attempted to d/w patient reason for his behaviour but he is unable to communicate any only asking if I want to see his genitals.      Patient remains disposition issue   Will need to readdress with psych and family
Patient seen and examined.    29 yo psych d/o with agitation and noncompliance, DM2 with hyperglycemia presented with DKA, NSTEMI managed medically  - c/w insulin  - c/w asa/plavix/statin    dispo issue given family reports unable to care for patient at this time
Seen and examined, care d/w resident.    Calm currently--states I annoy him.  No pain.      Patient remains disposition issue   Will need to readdress with psych and family
Patient seen and examined, d/w Dr. Dunn, agree with above.     Lying in bed, calm. 1:1 at bedside.     27 yo M w/ intellectual disability, psychiatric disorder with aggressive behavior requiring recent use of physical restraints and medications for staff and patient safety, who was admitted with mild DKA in the setting of uncontrolled DM2. Needs basal/bolus insulin as per endocrine given high risk of developing ketosis. Appreciate endocrine recs regarding insulin regimen and psychiatric assistance with behavioral management. Currently seeking group home placement, as family unable to manage patient, appreciate CM/SW assistance.
Patient seen and examined, d/w Dr. Dunn, agree with above.     Lying in bed, playing checkers on the phone, currently calm. 1:1 at bedside.     29 yo M w/ intellectual disability, psychiatric disorder with aggressive behavior requiring recent use of physical restraints and medications for staff and patient safety, who was admitted with mild DKA in the setting of uncontrolled DM2. Needs basal/bolus insulin as per endocrine given high risk of developing ketosis. Appreciate endocrine recs regarding insulin regimen and psychiatric assistance with behavioral management (doses of insulin and depakote adjusted as per recommendations). Currently seeking group home placement, as family unable to manage patient, appreciate CM/SW assistance.
Patient seen and examined, d/w Dr. Dunn, agree with above.     Patient calm this AM for me, asking when he will go home. Playing on his phone.     27 yo M w/ intellectual disability, psychiatric disorder with aggressive behavior requiring recent use of physical restraints and medications for staff and patient safety, who was admitted with mild DKA in the setting of uncontrolled DM2. Family unable to take care of patient at home, not able to administer insulin to him. Needs basal/bolus insulin as per endocrine given high risk of developing ketosis. Currently seeking group home placement. Appreciate CM/SW assistance and endocrine recs regarding insulin regimen. Will see if there are any beds available on [address], as patient may benefit from transfer to that unit, will f/u further psych recs regarding behavioural management.

## 2019-01-06 NOTE — PROGRESS NOTE BEHAVIORAL HEALTH - NSBHFUPINTERVALCCFT_PSY_A_CORE
Patient had been doing fine, with intermittent agitation which did not warrant prn's, but this morning patient became acutely agitated, aggressive, required multiple staff members to ensure safety. Patient required Haldol 5mg + Ativan 2mg for agitation. On 1:1 as has been since admission. Reviewed records, lashae from [facility]. Unable to reach his psychiatric provider this week as she is away- Maryanne Pabon. Unable to reach family- multiple tries.
Per MAR: got Ativan at 4PM yesterday and today, at 3AM    This morning, No reported bouts of agitation. Pt has been calm and cooperative. He is redirectable. No verbalization of SI/HI.
Since my evaluation yesterday, patient did receive another dose of Ativan 2mg and was as per team sedated. Dose of Depakote was held there after. This morning he has been pleasant with no issues.
agitation
follow up for agitation
agitation
I am fine.

## 2019-01-06 NOTE — PROGRESS NOTE BEHAVIORAL HEALTH - NSBHCONSULTOBS_PSY_A_CORE
Constant observation

## 2019-01-06 NOTE — PROGRESS NOTE BEHAVIORAL HEALTH - REMOTE MEMORY
Stable    8/14:  CRT 1.7 -- d/c fluid restriction  8/17:  CRT 1.9 -- encourage fluids to 1600cc/day  
Other

## 2019-01-06 NOTE — PROGRESS NOTE BEHAVIORAL HEALTH - NSBHCONSULTFOLLOWAFTERCARE_PSY_A_CORE FT
pt may  return to his residence with psychiatric f/u.
F/U WITH OUTPATIENT PSYCHIATRY AT [facility]. PLEASE CALL FOR ANY QUESTIONS OR CONCERNS OR IF ANY F/U IS NEEDED

## 2019-01-06 NOTE — PROGRESS NOTE BEHAVIORAL HEALTH - RISK ASSESSMENT
risk factors: severe intellectual disability, poor frustration tolerance, aggression, anger outbursts.
Chronic risk factors are Single, male, intellectual disability, chronic medical illness, poor reactivity to stressors.   Protective risk factors are no hx of prior attempts, supportive social network or family, medication/follow up compliance, no active substance use, and outpatient follow up
Risk factors: severe intellectual disability, poor frustration tolerance, hx of aggression, angry outbursts
risk factors: severe intellectual disability, poor frustration tolerance, aggression, anger outbursts.
Risk factors: severe intellectual disability, poor frustration tolerance, hx of aggression, angry outbursts, verbalizes yes when asked about SI/HI, though no clear intent or plan.
Risk factors: severe intellectual disability, poor frustration tolerance, hx of aggression, angry outbursts, verbalizes yes when asked about SI/HI, though no clear intent or plan.
risk factors: severe intellectual disability, poor frustration tolerance, aggression, anger outbursts

## 2019-01-06 NOTE — PROGRESS NOTE BEHAVIORAL HEALTH - NSBHATTESTSEENBY_PSY_A_CORE
Attending Psychiatrist supervising NP/Trainee, meeting pt...
Attending Psychiatrist supervising NP/Trainee, meeting pt...
attending Psychiatrist without NP/Trainee
attending Psychiatrist without NP/Trainee
Attending Psychiatrist supervising NP/Trainee, meeting pt...
attending Psychiatrist without NP/Trainee
attending Psychiatrist without NP/Trainee

## 2019-01-07 PROCEDURE — 99233 SBSQ HOSP IP/OBS HIGH 50: CPT

## 2019-01-07 RX ORDER — INSULIN LISPRO 100/ML
VIAL (ML) SUBCUTANEOUS AT BEDTIME
Qty: 0 | Refills: 0 | Status: DISCONTINUED | OUTPATIENT
Start: 2019-01-07 | End: 2019-01-08

## 2019-01-07 RX ORDER — INSULIN GLARGINE 100 [IU]/ML
29 INJECTION, SOLUTION SUBCUTANEOUS EVERY MORNING
Qty: 0 | Refills: 0 | Status: DISCONTINUED | OUTPATIENT
Start: 2019-01-07 | End: 2019-01-08

## 2019-01-07 RX ORDER — INSULIN LISPRO 100/ML
15 VIAL (ML) SUBCUTANEOUS
Qty: 0 | Refills: 0 | Status: DISCONTINUED | OUTPATIENT
Start: 2019-01-07 | End: 2019-01-08

## 2019-01-07 RX ORDER — INSULIN LISPRO 100/ML
VIAL (ML) SUBCUTANEOUS
Qty: 0 | Refills: 0 | Status: DISCONTINUED | OUTPATIENT
Start: 2019-01-07 | End: 2019-01-08

## 2019-01-07 RX ADMIN — CLOPIDOGREL BISULFATE 75 MILLIGRAM(S): 75 TABLET, FILM COATED ORAL at 11:40

## 2019-01-07 RX ADMIN — Medication 1: at 08:53

## 2019-01-07 RX ADMIN — ENOXAPARIN SODIUM 40 MILLIGRAM(S): 100 INJECTION SUBCUTANEOUS at 11:40

## 2019-01-07 RX ADMIN — DIVALPROEX SODIUM 750 MILLIGRAM(S): 500 TABLET, DELAYED RELEASE ORAL at 21:20

## 2019-01-07 RX ADMIN — Medication 14 UNIT(S): at 08:53

## 2019-01-07 RX ADMIN — Medication 1 MILLIGRAM(S): at 11:40

## 2019-01-07 RX ADMIN — Medication 15 UNIT(S): at 18:53

## 2019-01-07 RX ADMIN — Medication 4: at 12:59

## 2019-01-07 RX ADMIN — Medication 1 MILLIGRAM(S): at 21:20

## 2019-01-07 RX ADMIN — ATORVASTATIN CALCIUM 80 MILLIGRAM(S): 80 TABLET, FILM COATED ORAL at 21:13

## 2019-01-07 RX ADMIN — Medication 14 UNIT(S): at 12:59

## 2019-01-07 RX ADMIN — Medication 2: at 18:52

## 2019-01-07 RX ADMIN — HALOPERIDOL DECANOATE 20 MILLIGRAM(S): 100 INJECTION INTRAMUSCULAR at 21:15

## 2019-01-07 RX ADMIN — Medication 2 MILLIGRAM(S): at 09:29

## 2019-01-07 RX ADMIN — Medication 2 MILLIGRAM(S): at 22:38

## 2019-01-07 RX ADMIN — INSULIN GLARGINE 26 UNIT(S): 100 INJECTION, SOLUTION SUBCUTANEOUS at 08:52

## 2019-01-07 RX ADMIN — DIVALPROEX SODIUM 750 MILLIGRAM(S): 500 TABLET, DELAYED RELEASE ORAL at 08:55

## 2019-01-07 RX ADMIN — Medication 81 MILLIGRAM(S): at 11:40

## 2019-01-07 RX ADMIN — LISINOPRIL 5 MILLIGRAM(S): 2.5 TABLET ORAL at 08:55

## 2019-01-07 NOTE — PROGRESS NOTE ADULT - PROBLEM SELECTOR PLAN 4
Resolved  Type II NSTEMI in the setting of DKA; Cardiac enzymes were elevated with HS trop 913, , 35.27. EKG w/ NSR and no LISETTE. Trops peaked, no longer trending  -S/P Aspirin an Brilinta load   -c/w Asa  -C/w Plavix 75mg daily  -c/w Atorvastatin  -no plan for cath at this time as per Cards

## 2019-01-07 NOTE — PROGRESS NOTE ADULT - PROBLEM SELECTOR PLAN 2
Resolved. Initially occurred likely in setting of poorly uncontrolled DM and non-compliance with diet. no clear infectious source. UA negative. CXR clear.     FS still not controlled.  -Endocrine recs appreciated Lantus increased to 29 daily. Pt should get  this every morning.   -Will increase Humalog to 15/15/15 breakfast/lunch/dinner.

## 2019-01-07 NOTE — PROGRESS NOTE ADULT - ASSESSMENT
28 year old man history of uncontrolled T2DM on oral Metformin, intellectual disability, bipolar vs schizophrenia presents to the ED on 12/22 with complaints of chest pain, with Type II NSTEMI w/o indication for cath.  Also presented with NBNB; found to be with elevated glucose with concern for mild DKA; now resolved. Pt w/ repeated episodes of agitation/ aggression requiring restraints and antipsychotics.

## 2019-01-07 NOTE — PROGRESS NOTE ADULT - SUBJECTIVE AND OBJECTIVE BOX
Chief Complaint: DM2     History: denies n/v, tolerates po, no hypoglycemia.  Hyperglycemic today    MEDICATIONS  (STANDING):  aspirin  chewable 81 milliGRAM(s) Oral daily  atorvastatin 80 milliGRAM(s) Oral at bedtime  benztropine 1 milliGRAM(s) Oral two times a day  clopidogrel Tablet 75 milliGRAM(s) Oral daily  dextrose 5%. 1000 milliLiter(s) (50 mL/Hr) IV Continuous <Continuous>  dextrose 50% Injectable 12.5 Gram(s) IV Push once  dextrose 50% Injectable 25 Gram(s) IV Push once  dextrose 50% Injectable 25 Gram(s) IV Push once  diVALproex  milliGRAM(s) Oral at bedtime  diVALproex  milliGRAM(s) Oral <User Schedule>  enoxaparin Injectable 40 milliGRAM(s) SubCutaneous every 24 hours  haloperidol     Tablet 20 milliGRAM(s) Oral at bedtime  insulin glargine Injectable (LANTUS) 26 Unit(s) SubCutaneous every morning  insulin lispro (HumaLOG) corrective regimen sliding scale   SubCutaneous at bedtime  insulin lispro (HumaLOG) corrective regimen sliding scale   SubCutaneous three times a day before meals  insulin lispro Injectable (HumaLOG) 14 Unit(s) SubCutaneous with breakfast  insulin lispro Injectable (HumaLOG) 14 Unit(s) SubCutaneous before dinner  insulin lispro Injectable (HumaLOG) 14 Unit(s) SubCutaneous before lunch  lisinopril 5 milliGRAM(s) Oral daily    MEDICATIONS  (PRN):  dextrose 40% Gel 15 Gram(s) Oral once PRN Blood Glucose LESS THAN 70 milliGRAM(s)/deciliter  glucagon  Injectable 1 milliGRAM(s) IntraMuscular once PRN Glucose LESS THAN 70 milligrams/deciliter  LORazepam   Injectable 2 milliGRAM(s) IV Push every 8 hours PRN Agitation  OLANZapine Injectable 5 milliGRAM(s) IntraMuscular every 6 hours PRN Severe Agitation      Allergies    No Known Allergies    Intolerances      Review of Systems:  Constitutional: No fever  Eyes: No blurry vision  Neuro: No tremors  HEENT: No pain      PHYSICAL EXAM:  Vital Signs Last 24 Hrs  T(C): 36.5 (07 Jan 2019 11:54), Max: 37.3 (06 Jan 2019 21:51)  T(F): 97.7 (07 Jan 2019 11:54), Max: 99.2 (06 Jan 2019 21:51)  HR: 107 (07 Jan 2019 11:54) (82 - 107)  BP: 113/64 (07 Jan 2019 11:54) (105/59 - 116/76)  BP(mean): --  RR: 18 (07 Jan 2019 11:54) (18 - 18)  SpO2: 97% (07 Jan 2019 11:54) (97% - 99%)  GENERAL: NAD, well-groomed, well-developed  EYES: No proptosis, no lid lag, anicteric  HEENT:  Atraumatic, Normocephalic, moist mucous membranes  NEURO/PSYCH: alert and oriented x 3      CAPILLARY BLOOD GLUCOSE      POCT Blood Glucose.: 322 mg/dL (07 Jan 2019 12:58)  POCT Blood Glucose.: >600 mg/dL (07 Jan 2019 12:55)- likely error  POCT Blood Glucose.: 200 mg/dL (07 Jan 2019 08:45)  POCT Blood Glucose.: 268 mg/dL (06 Jan 2019 21:50)  POCT Blood Glucose.: 257 mg/dL (06 Jan 2019 17:40)      POCT Blood Glucose.: 140 mg/dL (06 Jan 2019 13:01)  POCT Blood Glucose.: 233 mg/dL (06 Jan 2019 08:57)  POCT Blood Glucose.: 241 mg/dL (05 Jan 2019 21:54)  POCT Blood Glucose.: 144 mg/dL (05 Jan 2019 19:55)                  Thyroid Function Tests:      Hemoglobin A1C, Whole Blood: 13.8 % <H> [4.0 - 5.6] (12-24-18 @ 07:15)

## 2019-01-07 NOTE — PROGRESS NOTE ADULT - SUBJECTIVE AND OBJECTIVE BOX
Patient is a 28y old  Male who presents with a chief complaint of Chest Pain, DKA (07 Jan 2019 14:19)      SUBJECTIVE / OVERNIGHT EVENTS:    Review of Systems:     MEDICATIONS  (STANDING):  aspirin  chewable 81 milliGRAM(s) Oral daily  atorvastatin 80 milliGRAM(s) Oral at bedtime  benztropine 1 milliGRAM(s) Oral two times a day  clopidogrel Tablet 75 milliGRAM(s) Oral daily  dextrose 5%. 1000 milliLiter(s) (50 mL/Hr) IV Continuous <Continuous>  dextrose 50% Injectable 12.5 Gram(s) IV Push once  dextrose 50% Injectable 25 Gram(s) IV Push once  dextrose 50% Injectable 25 Gram(s) IV Push once  diVALproex  milliGRAM(s) Oral at bedtime  diVALproex  milliGRAM(s) Oral <User Schedule>  enoxaparin Injectable 40 milliGRAM(s) SubCutaneous every 24 hours  haloperidol     Tablet 20 milliGRAM(s) Oral at bedtime  insulin glargine Injectable (LANTUS) 29 Unit(s) SubCutaneous every morning  insulin lispro (HumaLOG) corrective regimen sliding scale   SubCutaneous at bedtime  insulin lispro (HumaLOG) corrective regimen sliding scale   SubCutaneous three times a day before meals  insulin lispro Injectable (HumaLOG) 15 Unit(s) SubCutaneous before dinner  insulin lispro Injectable (HumaLOG) 15 Unit(s) SubCutaneous before lunch  insulin lispro Injectable (HumaLOG) 15 Unit(s) SubCutaneous with breakfast  lisinopril 5 milliGRAM(s) Oral daily    MEDICATIONS  (PRN):  dextrose 40% Gel 15 Gram(s) Oral once PRN Blood Glucose LESS THAN 70 milliGRAM(s)/deciliter  glucagon  Injectable 1 milliGRAM(s) IntraMuscular once PRN Glucose LESS THAN 70 milligrams/deciliter  LORazepam   Injectable 2 milliGRAM(s) IV Push every 8 hours PRN Agitation  OLANZapine Injectable 5 milliGRAM(s) IntraMuscular every 6 hours PRN Severe Agitation      PHYSICAL EXAM:    I&O's Summary  Vital Signs Last 24 Hrs  T(C): 36.5 (07 Jan 2019 11:54), Max: 37.3 (06 Jan 2019 21:51)  T(F): 97.7 (07 Jan 2019 11:54), Max: 99.2 (06 Jan 2019 21:51)  HR: 107 (07 Jan 2019 11:54) (82 - 107)  BP: 113/64 (07 Jan 2019 11:54) (105/59 - 116/76)  BP(mean): --  RR: 18 (07 Jan 2019 11:54) (18 - 18)  SpO2: 97% (07 Jan 2019 11:54) (97% - 99%)    LABS:  CAPILLARY BLOOD GLUCOSE      POCT Blood Glucose.: 322 mg/dL (07 Jan 2019 12:58)  POCT Blood Glucose.: >600 mg/dL (07 Jan 2019 12:55)  POCT Blood Glucose.: 200 mg/dL (07 Jan 2019 08:45)  POCT Blood Glucose.: 268 mg/dL (06 Jan 2019 21:50)  POCT Blood Glucose.: 257 mg/dL (06 Jan 2019 17:40)                      RADIOLOGY & ADDITIONAL TESTS:    Imaging Personally Reviewed:    Consultant(s) Notes Reviewed:      Care Discussed with Consultants/Other Providers: Patient is a 28y old  Male who presents with a chief complaint of Chest Pain, DKA (07 Jan 2019 14:19)      SUBJECTIVE / OVERNIGHT EVENTS: Pt w/ no complaints today.     Review of Systems:     MEDICATIONS  (STANDING):  aspirin  chewable 81 milliGRAM(s) Oral daily  atorvastatin 80 milliGRAM(s) Oral at bedtime  benztropine 1 milliGRAM(s) Oral two times a day  clopidogrel Tablet 75 milliGRAM(s) Oral daily  dextrose 5%. 1000 milliLiter(s) (50 mL/Hr) IV Continuous <Continuous>  dextrose 50% Injectable 12.5 Gram(s) IV Push once  dextrose 50% Injectable 25 Gram(s) IV Push once  dextrose 50% Injectable 25 Gram(s) IV Push once  diVALproex  milliGRAM(s) Oral at bedtime  diVALproex  milliGRAM(s) Oral <User Schedule>  enoxaparin Injectable 40 milliGRAM(s) SubCutaneous every 24 hours  haloperidol     Tablet 20 milliGRAM(s) Oral at bedtime  insulin glargine Injectable (LANTUS) 29 Unit(s) SubCutaneous every morning  insulin lispro (HumaLOG) corrective regimen sliding scale   SubCutaneous at bedtime  insulin lispro (HumaLOG) corrective regimen sliding scale   SubCutaneous three times a day before meals  insulin lispro Injectable (HumaLOG) 15 Unit(s) SubCutaneous before dinner  insulin lispro Injectable (HumaLOG) 15 Unit(s) SubCutaneous before lunch  insulin lispro Injectable (HumaLOG) 15 Unit(s) SubCutaneous with breakfast  lisinopril 5 milliGRAM(s) Oral daily    MEDICATIONS  (PRN):  dextrose 40% Gel 15 Gram(s) Oral once PRN Blood Glucose LESS THAN 70 milliGRAM(s)/deciliter  glucagon  Injectable 1 milliGRAM(s) IntraMuscular once PRN Glucose LESS THAN 70 milligrams/deciliter  LORazepam   Injectable 2 milliGRAM(s) IV Push every 8 hours PRN Agitation  OLANZapine Injectable 5 milliGRAM(s) IntraMuscular every 6 hours PRN Severe Agitation      PHYSICAL EXAM:    I&O's Summary  Vital Signs Last 24 Hrs  T(C): 36.5 (07 Jan 2019 11:54), Max: 37.3 (06 Jan 2019 21:51)  T(F): 97.7 (07 Jan 2019 11:54), Max: 99.2 (06 Jan 2019 21:51)  HR: 107 (07 Jan 2019 11:54) (82 - 107)  BP: 113/64 (07 Jan 2019 11:54) (105/59 - 116/76)  BP(mean): --  RR: 18 (07 Jan 2019 11:54) (18 - 18)  SpO2: 97% (07 Jan 2019 11:54) (97% - 99%)  PHYSICAL EXAM  GENERAL: NAD, well-developed  HEAD:  Atraumatic, Normocephalic  EYES: EOMI,  conjunctiva and sclera clear  NECK: Supple,  CHEST/LUNG: Clear to auscultation bilaterally; No wheeze  HEART: Regular rate and rhythm; No murmurs, rubs, or gallops  ABDOMEN: Soft, Nontender, Nondistended; Bowel sounds present  EXTREMITIES:  2+ Peripheral Pulses, No clubbing, cyanosis, or edema  PSYCH: calm  SKIN: No rashes or lesions    LABS:  CAPILLARY BLOOD GLUCOSE      POCT Blood Glucose.: 322 mg/dL (07 Jan 2019 12:58)  POCT Blood Glucose.: >600 mg/dL (07 Jan 2019 12:55)  POCT Blood Glucose.: 200 mg/dL (07 Jan 2019 08:45)  POCT Blood Glucose.: 268 mg/dL (06 Jan 2019 21:50)  POCT Blood Glucose.: 257 mg/dL (06 Jan 2019 17:40)                      RADIOLOGY & ADDITIONAL TESTS:    Imaging Personally Reviewed:    Consultant(s) Notes Reviewed:      Care Discussed with Consultants/Other Providers:

## 2019-01-07 NOTE — PROGRESS NOTE ADULT - PROBLEM SELECTOR PLAN 1
Patient admitted for DKA. patient MUST ALWAYS receive basal insulin to prevent return of DKA.   increase Lantus to 29 daily, patient gets the does every morning.   c/w -  Humalog to 15/15/15 breakfast/lunch/dinner.   change correction scale to moderate dose premeal and bedtime scale.    -would advise that primary team be notified if pt refuses Lantus/insulin as he is otherwise at risk for DKA.    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    Patient will need basal bolus insulin at MO (to be administered at group home).  He will need outpatient endocrine follow up [phone].

## 2019-01-08 VITALS
HEART RATE: 73 BPM | OXYGEN SATURATION: 100 % | DIASTOLIC BLOOD PRESSURE: 61 MMHG | TEMPERATURE: 98 F | SYSTOLIC BLOOD PRESSURE: 95 MMHG | RESPIRATION RATE: 20 BRPM

## 2019-01-08 PROCEDURE — 99239 HOSP IP/OBS DSCHRG MGMT >30: CPT

## 2019-01-08 PROCEDURE — 99232 SBSQ HOSP IP/OBS MODERATE 35: CPT

## 2019-01-08 RX ORDER — INSULIN DETEMIR 100/ML (3)
29 INSULIN PEN (ML) SUBCUTANEOUS
Qty: 3 | Refills: 0 | OUTPATIENT
Start: 2019-01-08 | End: 2019-02-06

## 2019-01-08 RX ORDER — INSULIN ASPART 100 [IU]/ML
15 INJECTION, SOLUTION SUBCUTANEOUS
Qty: 5 | Refills: 0 | OUTPATIENT
Start: 2019-01-08 | End: 2019-02-06

## 2019-01-08 RX ORDER — HALOPERIDOL DECANOATE 100 MG/ML
1 INJECTION INTRAMUSCULAR
Qty: 0 | Refills: 0 | COMMUNITY
Start: 2019-01-08

## 2019-01-08 RX ORDER — INSULIN GLARGINE 100 [IU]/ML
29 INJECTION, SOLUTION SUBCUTANEOUS
Qty: 1 | Refills: 0 | OUTPATIENT
Start: 2019-01-08 | End: 2019-02-06

## 2019-01-08 RX ORDER — CLOPIDOGREL BISULFATE 75 MG/1
1 TABLET, FILM COATED ORAL
Qty: 30 | Refills: 0 | OUTPATIENT
Start: 2019-01-08 | End: 2019-02-06

## 2019-01-08 RX ORDER — LISINOPRIL 2.5 MG/1
1 TABLET ORAL
Qty: 30 | Refills: 0 | OUTPATIENT
Start: 2019-01-08 | End: 2019-02-06

## 2019-01-08 RX ORDER — ATORVASTATIN CALCIUM 80 MG/1
1 TABLET, FILM COATED ORAL
Qty: 0 | Refills: 0 | COMMUNITY

## 2019-01-08 RX ORDER — ATORVASTATIN CALCIUM 80 MG/1
1 TABLET, FILM COATED ORAL
Qty: 30 | Refills: 0 | OUTPATIENT
Start: 2019-01-08 | End: 2019-02-06

## 2019-01-08 RX ORDER — ASPIRIN/CALCIUM CARB/MAGNESIUM 324 MG
1 TABLET ORAL
Qty: 0 | Refills: 0 | COMMUNITY
Start: 2019-01-08

## 2019-01-08 RX ORDER — DIVALPROEX SODIUM 500 MG/1
3 TABLET, DELAYED RELEASE ORAL
Qty: 180 | Refills: 0 | OUTPATIENT
Start: 2019-01-08 | End: 2019-02-06

## 2019-01-08 RX ORDER — ISOPROPYL ALCOHOL, BENZOCAINE .7; .06 ML/ML; ML/ML
1 SWAB TOPICAL
Qty: 100 | Refills: 1 | OUTPATIENT
Start: 2019-01-08 | End: 2019-02-26

## 2019-01-08 RX ORDER — HALOPERIDOL DECANOATE 100 MG/ML
1 INJECTION INTRAMUSCULAR
Qty: 0 | Refills: 0 | COMMUNITY

## 2019-01-08 RX ORDER — INSULIN LISPRO 100/ML
15 VIAL (ML) SUBCUTANEOUS
Qty: 2 | Refills: 0 | OUTPATIENT
Start: 2019-01-08 | End: 2019-02-06

## 2019-01-08 RX ORDER — METFORMIN HYDROCHLORIDE 850 MG/1
1 TABLET ORAL
Qty: 0 | Refills: 0 | COMMUNITY

## 2019-01-08 RX ADMIN — Medication 15 UNIT(S): at 08:57

## 2019-01-08 RX ADMIN — DIVALPROEX SODIUM 750 MILLIGRAM(S): 500 TABLET, DELAYED RELEASE ORAL at 08:57

## 2019-01-08 RX ADMIN — Medication 0: at 08:57

## 2019-01-08 RX ADMIN — LISINOPRIL 5 MILLIGRAM(S): 2.5 TABLET ORAL at 08:58

## 2019-01-08 RX ADMIN — INSULIN GLARGINE 29 UNIT(S): 100 INJECTION, SOLUTION SUBCUTANEOUS at 08:58

## 2019-01-08 NOTE — PROGRESS NOTE ADULT - PROBLEM SELECTOR PROBLEM 5
R/O Need for prophylactic measure

## 2019-01-08 NOTE — PROGRESS NOTE ADULT - PROBLEM SELECTOR PLAN 2
Resolved. Initially occurred likely in setting of poorly uncontrolled DM and non-compliance with diet. no clear infectious source. UA negative. CXR clear.     FS improved  -C/w Lantus 29U daily and Humalog to 15/15/15 breakfast/lunch/dinner.  -will continue to appreciate leana davis

## 2019-01-08 NOTE — PROGRESS NOTE ADULT - PROBLEM SELECTOR PROBLEM 1
Uncontrolled type 2 diabetes mellitus, without long-term current use of insulin
Uncontrolled type 2 diabetes mellitus, without long-term current use of insulin
R/O Schizo-affective schizophrenia
Uncontrolled type 2 diabetes mellitus, without long-term current use of insulin
Chest pain
R/O Schizo-affective schizophrenia
Uncontrolled type 2 diabetes mellitus, without long-term current use of insulin
R/O Schizo-affective schizophrenia
Chest pain
Chest pain

## 2019-01-08 NOTE — PROGRESS NOTE ADULT - PROVIDER SPECIALTY LIST ADULT
Cardiology
Cardiology
Endocrinology
Hospitalist
Hospitalist
Internal Medicine
Hospitalist
Endocrinology
Internal Medicine

## 2019-01-08 NOTE — PROGRESS NOTE ADULT - PROBLEM SELECTOR PLAN 5
DVT ppx: Lovenox  Diet: Regular/Consistent Carb/DASH/TLC  Dispo: had long discussion with patient's mother and sister  on 12/27 (at bedside) after the incident of aggression. They stated that patient behaves like this at home quite frequently, and that it is difficult for them to restrain and keep him calm at home. Patient also refuses to take medications. Endocrinology has been involved in his care and stated that the patient will need insulin for adequate glycemic control. Patient's family stated that they will not be able to give him insulin at home as they had tried 1 year prior, without success. Family was in agreement with placing patient in a  group home, to ensure adequate care.  -will f/u w/  regarding placement into group home  -As per psych, pt not a candidate for ele due to his insulin needs
DVT ppx: Lovenox  Diet: Regular/Consistent Carb/DASH/TLC
DVT ppx: Lovenox  Diet: Regular/Consistent Carb/DASH/TLC
DVT ppx: Lovenox  Diet: Regular/Consistent Carb/DASH/TLC  Dispo: had long discussion with patient's mother and sister  on 12/27 (at bedside) after the incident of aggression. They stated that patient behaves like this at home quite frequently, and that it is difficult for them to restrain and keep him calm at home. Patient also refuses to take medications. Endocrinology has been involved in his care and stated that the patient will need insulin for adequate glycemic control. Patient's family stated that they will not be able to give him insulin at home as they had tried 1 year prior, without success. Family was in agreement with placing patient in a  group home, to ensure adequate care.  -will f/u w/  regarding placement into group home  -As per psych, pt not a candidate for ele due to his insulin needs
DVT ppx: Lovenox  Diet: Regular/Consistent Carb/DASH/TLC  Dispo: had long discussion with patient's mother and sister  on 12/27 (at bedside) after the incident of aggression. They stated that patient behaves like this at home quite frequently, and that it is difficult for them to restrain and keep him calm at home. Patient also refuses to take medications. Endocrinology has been involved in his care and stated that the patient will need insulin for adequate glycemic control. Patient's family stated that they will not be able to give him insulin at home as they had tried 1 year prior, without success. Family was in agreement with placing patient in a  group home, to ensure adequate care.  -will f/u w/  regarding placement into group home  -As per psych, pt not a candidate for ele due to his insulin needs
DVT ppx: Lovenox  Dispo: Dc planning for today to home. Case d/w CM/SW and patients father at bedside. Further details are outlined in the discharge document. Spent 31 min in discharge planning and coordination.
DVT ppx: Lovenox  Diet: Regular/Consistent Carb/DASH/TLC
DVT ppx: Lovenox  Dispo: Working with ANNEMARIE/URVASHI for safe discharge. Family unable to administer insulin to patient through out the day.
DVT ppx: Lovenox  Diet: Regular/Consistent Carb/DASH/TLC  Dispo: had long discussion with patient's mother and sister  on 12/27 (at bedside) after the incident of aggression. They stated that patient behaves like this at home quite frequently, and that it is difficult for them to restrain and keep him calm at home. Patient also refuses to take medications. Endocrinology has been involved in his care and stated that the patient will need insulin for adequate glycemic control. Patient's family stated that they will not be able to give him insulin at home as they had tried 1 year prior, without success. Family was in agreement with placing patient in a  group home, to ensure adequate care.  -will f/u w/  regarding placement into group home

## 2019-01-08 NOTE — PROGRESS NOTE ADULT - PROBLEM SELECTOR PLAN 1
-c/w depakote 750mg PO AM and Depakote 750mg PO qhs  -c/w haldol 20mg qhs and cogentin 1mg qd   -c/w Zyprexa 5mg PRN  -will continue to appreciate psych recs   -c/w constant observation  - Monitor QTC

## 2019-01-08 NOTE — PROGRESS NOTE ADULT - PROBLEM SELECTOR PLAN 3
- see above  - will need to continue with basal/bolus insulin as per endocrine, given risk of developing DKA . Pt has ketosis prone DM2   -pts father had DM and insulin administration education today   - Will continue to F/U endocrine recs.

## 2019-01-08 NOTE — PROGRESS NOTE ADULT - REASON FOR ADMISSION
Chest Pain, DKA

## 2019-01-29 ENCOUNTER — APPOINTMENT (OUTPATIENT)
Dept: ENDOCRINOLOGY | Facility: CLINIC | Age: 29
End: 2019-01-29

## 2019-01-31 ENCOUNTER — OUTPATIENT (OUTPATIENT)
Dept: OUTPATIENT SERVICES | Facility: HOSPITAL | Age: 29
LOS: 1 days | Discharge: HOME | End: 2019-01-31
Payer: MEDICARE

## 2019-01-31 PROCEDURE — 90792 PSYCH DIAG EVAL W/MED SRVCS: CPT

## 2019-02-01 DIAGNOSIS — F25.9 SCHIZOAFFECTIVE DISORDER, UNSPECIFIED: ICD-10-CM

## 2019-02-01 DIAGNOSIS — F63.81 INTERMITTENT EXPLOSIVE DISORDER: ICD-10-CM

## 2019-02-01 DIAGNOSIS — F79 UNSPECIFIED INTELLECTUAL DISABILITIES: ICD-10-CM

## 2019-02-26 ENCOUNTER — APPOINTMENT (OUTPATIENT)
Dept: ENDOCRINOLOGY | Facility: CLINIC | Age: 29
End: 2019-02-26

## 2019-03-12 ENCOUNTER — OUTPATIENT (OUTPATIENT)
Dept: OUTPATIENT SERVICES | Facility: HOSPITAL | Age: 29
LOS: 1 days | Discharge: STILL ADMITTED OR ADMINISTRATIVE | End: 2019-03-12
Payer: MEDICARE

## 2019-03-12 PROCEDURE — 90792 PSYCH DIAG EVAL W/MED SRVCS: CPT

## 2019-03-13 DIAGNOSIS — F25.0 SCHIZOAFFECTIVE DISORDER, BIPOLAR TYPE: ICD-10-CM

## 2019-03-13 DIAGNOSIS — F63.81 INTERMITTENT EXPLOSIVE DISORDER: ICD-10-CM

## 2019-04-11 ENCOUNTER — INPATIENT (INPATIENT)
Facility: HOSPITAL | Age: 29
LOS: 3 days | Discharge: HOME | End: 2019-04-15
Attending: HOSPITALIST | Admitting: HOSPITALIST
Payer: MEDICARE

## 2019-04-11 VITALS
SYSTOLIC BLOOD PRESSURE: 132 MMHG | HEART RATE: 86 BPM | OXYGEN SATURATION: 100 % | RESPIRATION RATE: 16 BRPM | TEMPERATURE: 98 F | DIASTOLIC BLOOD PRESSURE: 94 MMHG

## 2019-04-11 DIAGNOSIS — F79 UNSPECIFIED INTELLECTUAL DISABILITIES: ICD-10-CM

## 2019-04-11 DIAGNOSIS — R73.9 HYPERGLYCEMIA, UNSPECIFIED: ICD-10-CM

## 2019-04-11 DIAGNOSIS — E11.8 TYPE 2 DIABETES MELLITUS WITH UNSPECIFIED COMPLICATIONS: ICD-10-CM

## 2019-04-11 LAB
ALBUMIN SERPL ELPH-MCNC: 4.2 G/DL — SIGNIFICANT CHANGE UP (ref 3.3–5)
ALP SERPL-CCNC: 72 U/L — SIGNIFICANT CHANGE UP (ref 40–120)
ALT FLD-CCNC: 14 U/L — SIGNIFICANT CHANGE UP (ref 4–41)
ANION GAP SERPL CALC-SCNC: 12 MMO/L — SIGNIFICANT CHANGE UP (ref 7–14)
ANION GAP SERPL CALC-SCNC: 13 MMO/L — SIGNIFICANT CHANGE UP (ref 7–14)
ANION GAP SERPL CALC-SCNC: 15 MMO/L — HIGH (ref 7–14)
AST SERPL-CCNC: 24 U/L — SIGNIFICANT CHANGE UP (ref 4–40)
B-OH-BUTYR SERPL-SCNC: 1.5 MMOL/L — HIGH (ref 0–0.4)
BASE EXCESS BLDV CALC-SCNC: 1.5 MMOL/L — SIGNIFICANT CHANGE UP
BASOPHILS # BLD AUTO: 0.01 K/UL — SIGNIFICANT CHANGE UP (ref 0–0.2)
BASOPHILS NFR BLD AUTO: 0.1 % — SIGNIFICANT CHANGE UP (ref 0–2)
BILIRUB SERPL-MCNC: 0.2 MG/DL — SIGNIFICANT CHANGE UP (ref 0.2–1.2)
BLOOD GAS VENOUS - CREATININE: 0.52 MG/DL — SIGNIFICANT CHANGE UP (ref 0.5–1.3)
BUN SERPL-MCNC: 6 MG/DL — LOW (ref 7–23)
CALCIUM SERPL-MCNC: 8.9 MG/DL — SIGNIFICANT CHANGE UP (ref 8.4–10.5)
CALCIUM SERPL-MCNC: 9.4 MG/DL — SIGNIFICANT CHANGE UP (ref 8.4–10.5)
CALCIUM SERPL-MCNC: 9.9 MG/DL — SIGNIFICANT CHANGE UP (ref 8.4–10.5)
CHLORIDE BLDV-SCNC: 96 MMOL/L — SIGNIFICANT CHANGE UP (ref 96–108)
CHLORIDE SERPL-SCNC: 102 MMOL/L — SIGNIFICANT CHANGE UP (ref 98–107)
CHLORIDE SERPL-SCNC: 87 MMOL/L — LOW (ref 98–107)
CHLORIDE SERPL-SCNC: 96 MMOL/L — LOW (ref 98–107)
CK MB BLD-MCNC: 1.4 NG/ML — SIGNIFICANT CHANGE UP (ref 1–6.6)
CK MB BLD-MCNC: SIGNIFICANT CHANGE UP (ref 0–2.5)
CK SERPL-CCNC: 110 U/L — SIGNIFICANT CHANGE UP (ref 30–200)
CO2 SERPL-SCNC: 21 MMOL/L — LOW (ref 22–31)
CO2 SERPL-SCNC: 23 MMOL/L — SIGNIFICANT CHANGE UP (ref 22–31)
CO2 SERPL-SCNC: 24 MMOL/L — SIGNIFICANT CHANGE UP (ref 22–31)
CREAT SERPL-MCNC: 0.62 MG/DL — SIGNIFICANT CHANGE UP (ref 0.5–1.3)
CREAT SERPL-MCNC: 0.69 MG/DL — SIGNIFICANT CHANGE UP (ref 0.5–1.3)
CREAT SERPL-MCNC: 0.69 MG/DL — SIGNIFICANT CHANGE UP (ref 0.5–1.3)
EOSINOPHIL # BLD AUTO: 0.02 K/UL — SIGNIFICANT CHANGE UP (ref 0–0.5)
EOSINOPHIL NFR BLD AUTO: 0.3 % — SIGNIFICANT CHANGE UP (ref 0–6)
GAS PNL BLDV: 127 MMOL/L — LOW (ref 136–146)
GLUCOSE BLDV-MCNC: 688 — CRITICAL HIGH (ref 70–99)
GLUCOSE SERPL-MCNC: 189 MG/DL — HIGH (ref 70–99)
GLUCOSE SERPL-MCNC: 448 MG/DL — HIGH (ref 70–99)
GLUCOSE SERPL-MCNC: 732 MG/DL — CRITICAL HIGH (ref 70–99)
HBA1C BLD-MCNC: SIGNIFICANT CHANGE UP % (ref 4–5.6)
HCO3 BLDV-SCNC: 25 MMOL/L — SIGNIFICANT CHANGE UP (ref 20–27)
HCT VFR BLD CALC: 45.9 % — SIGNIFICANT CHANGE UP (ref 39–50)
HCT VFR BLDV CALC: 48 % — SIGNIFICANT CHANGE UP (ref 39–51)
HGB BLD-MCNC: 15.3 G/DL — SIGNIFICANT CHANGE UP (ref 13–17)
HGB BLDV-MCNC: 15.7 G/DL — SIGNIFICANT CHANGE UP (ref 13–17)
IMM GRANULOCYTES NFR BLD AUTO: 0.4 % — SIGNIFICANT CHANGE UP (ref 0–1.5)
LACTATE BLDV-MCNC: 1.9 MMOL/L — SIGNIFICANT CHANGE UP (ref 0.5–2)
LYMPHOCYTES # BLD AUTO: 2.41 K/UL — SIGNIFICANT CHANGE UP (ref 1–3.3)
LYMPHOCYTES # BLD AUTO: 33.3 % — SIGNIFICANT CHANGE UP (ref 13–44)
MAGNESIUM SERPL-MCNC: 1.7 MG/DL — SIGNIFICANT CHANGE UP (ref 1.6–2.6)
MAGNESIUM SERPL-MCNC: 1.8 MG/DL — SIGNIFICANT CHANGE UP (ref 1.6–2.6)
MCHC RBC-ENTMCNC: 27.5 PG — SIGNIFICANT CHANGE UP (ref 27–34)
MCHC RBC-ENTMCNC: 33.3 % — SIGNIFICANT CHANGE UP (ref 32–36)
MCV RBC AUTO: 82.6 FL — SIGNIFICANT CHANGE UP (ref 80–100)
MONOCYTES # BLD AUTO: 0.63 K/UL — SIGNIFICANT CHANGE UP (ref 0–0.9)
MONOCYTES NFR BLD AUTO: 8.7 % — SIGNIFICANT CHANGE UP (ref 2–14)
NEUTROPHILS # BLD AUTO: 4.14 K/UL — SIGNIFICANT CHANGE UP (ref 1.8–7.4)
NEUTROPHILS NFR BLD AUTO: 57.2 % — SIGNIFICANT CHANGE UP (ref 43–77)
NRBC # FLD: 0 K/UL — SIGNIFICANT CHANGE UP (ref 0–0)
PCO2 BLDV: 45 MMHG — SIGNIFICANT CHANGE UP (ref 41–51)
PH BLDV: 7.38 PH — SIGNIFICANT CHANGE UP (ref 7.32–7.43)
PHOSPHATE SERPL-MCNC: 2.3 MG/DL — LOW (ref 2.5–4.5)
PHOSPHATE SERPL-MCNC: 2.8 MG/DL — SIGNIFICANT CHANGE UP (ref 2.5–4.5)
PLATELET # BLD AUTO: 177 K/UL — SIGNIFICANT CHANGE UP (ref 150–400)
PMV BLD: 11.3 FL — SIGNIFICANT CHANGE UP (ref 7–13)
PO2 BLDV: 42 MMHG — HIGH (ref 35–40)
POTASSIUM BLDV-SCNC: 3.9 MMOL/L — SIGNIFICANT CHANGE UP (ref 3.4–4.5)
POTASSIUM SERPL-MCNC: 3.3 MMOL/L — LOW (ref 3.5–5.3)
POTASSIUM SERPL-MCNC: 4.3 MMOL/L — SIGNIFICANT CHANGE UP (ref 3.5–5.3)
POTASSIUM SERPL-MCNC: 4.4 MMOL/L — SIGNIFICANT CHANGE UP (ref 3.5–5.3)
POTASSIUM SERPL-SCNC: 3.3 MMOL/L — LOW (ref 3.5–5.3)
POTASSIUM SERPL-SCNC: 4.3 MMOL/L — SIGNIFICANT CHANGE UP (ref 3.5–5.3)
POTASSIUM SERPL-SCNC: 4.4 MMOL/L — SIGNIFICANT CHANGE UP (ref 3.5–5.3)
PROT SERPL-MCNC: 7.4 G/DL — SIGNIFICANT CHANGE UP (ref 6–8.3)
RBC # BLD: 5.56 M/UL — SIGNIFICANT CHANGE UP (ref 4.2–5.8)
RBC # FLD: 12.4 % — SIGNIFICANT CHANGE UP (ref 10.3–14.5)
SAO2 % BLDV: 75.3 % — SIGNIFICANT CHANGE UP (ref 60–85)
SODIUM SERPL-SCNC: 123 MMOL/L — LOW (ref 135–145)
SODIUM SERPL-SCNC: 132 MMOL/L — LOW (ref 135–145)
SODIUM SERPL-SCNC: 138 MMOL/L — SIGNIFICANT CHANGE UP (ref 135–145)
TROPONIN T, HIGH SENSITIVITY: 12 NG/L — SIGNIFICANT CHANGE UP (ref ?–14)
TROPONIN T, HIGH SENSITIVITY: 12 NG/L — SIGNIFICANT CHANGE UP (ref ?–14)
WBC # BLD: 7.24 K/UL — SIGNIFICANT CHANGE UP (ref 3.8–10.5)
WBC # FLD AUTO: 7.24 K/UL — SIGNIFICANT CHANGE UP (ref 3.8–10.5)

## 2019-04-11 PROCEDURE — 93010 ELECTROCARDIOGRAM REPORT: CPT

## 2019-04-11 PROCEDURE — 99223 1ST HOSP IP/OBS HIGH 75: CPT | Mod: GC

## 2019-04-11 PROCEDURE — 71045 X-RAY EXAM CHEST 1 VIEW: CPT | Mod: 26

## 2019-04-11 RX ORDER — INSULIN LISPRO 100/ML
15 VIAL (ML) SUBCUTANEOUS
Qty: 0 | Refills: 0 | Status: DISCONTINUED | OUTPATIENT
Start: 2019-04-11 | End: 2019-04-13

## 2019-04-11 RX ORDER — DEXTROSE 50 % IN WATER 50 %
12.5 SYRINGE (ML) INTRAVENOUS ONCE
Qty: 0 | Refills: 0 | Status: DISCONTINUED | OUTPATIENT
Start: 2019-04-11 | End: 2019-04-15

## 2019-04-11 RX ORDER — DEXTROSE 50 % IN WATER 50 %
25 SYRINGE (ML) INTRAVENOUS ONCE
Qty: 0 | Refills: 0 | Status: DISCONTINUED | OUTPATIENT
Start: 2019-04-11 | End: 2019-04-15

## 2019-04-11 RX ORDER — SODIUM CHLORIDE 9 MG/ML
2000 INJECTION INTRAMUSCULAR; INTRAVENOUS; SUBCUTANEOUS ONCE
Qty: 0 | Refills: 0 | Status: COMPLETED | OUTPATIENT
Start: 2019-04-11 | End: 2019-04-11

## 2019-04-11 RX ORDER — CLOPIDOGREL BISULFATE 75 MG/1
75 TABLET, FILM COATED ORAL DAILY
Qty: 0 | Refills: 0 | Status: DISCONTINUED | OUTPATIENT
Start: 2019-04-11 | End: 2019-04-15

## 2019-04-11 RX ORDER — INSULIN GLARGINE 100 [IU]/ML
29 INJECTION, SOLUTION SUBCUTANEOUS ONCE
Qty: 0 | Refills: 0 | Status: COMPLETED | OUTPATIENT
Start: 2019-04-11 | End: 2019-04-11

## 2019-04-11 RX ORDER — BENZTROPINE MESYLATE 1 MG
1 TABLET ORAL
Qty: 0 | Refills: 0 | Status: DISCONTINUED | OUTPATIENT
Start: 2019-04-11 | End: 2019-04-15

## 2019-04-11 RX ORDER — HALOPERIDOL DECANOATE 100 MG/ML
20 INJECTION INTRAMUSCULAR AT BEDTIME
Qty: 0 | Refills: 0 | Status: DISCONTINUED | OUTPATIENT
Start: 2019-04-11 | End: 2019-04-15

## 2019-04-11 RX ORDER — DIVALPROEX SODIUM 500 MG/1
750 TABLET, DELAYED RELEASE ORAL
Qty: 0 | Refills: 0 | Status: DISCONTINUED | OUTPATIENT
Start: 2019-04-11 | End: 2019-04-15

## 2019-04-11 RX ORDER — INSULIN LISPRO 100/ML
5 VIAL (ML) SUBCUTANEOUS ONCE
Qty: 0 | Refills: 0 | Status: COMPLETED | OUTPATIENT
Start: 2019-04-11 | End: 2019-04-11

## 2019-04-11 RX ORDER — ASPIRIN/CALCIUM CARB/MAGNESIUM 324 MG
81 TABLET ORAL DAILY
Qty: 0 | Refills: 0 | Status: DISCONTINUED | OUTPATIENT
Start: 2019-04-11 | End: 2019-04-15

## 2019-04-11 RX ORDER — ATORVASTATIN CALCIUM 80 MG/1
80 TABLET, FILM COATED ORAL AT BEDTIME
Qty: 0 | Refills: 0 | Status: DISCONTINUED | OUTPATIENT
Start: 2019-04-11 | End: 2019-04-15

## 2019-04-11 RX ORDER — DEXTROSE 50 % IN WATER 50 %
15 SYRINGE (ML) INTRAVENOUS ONCE
Qty: 0 | Refills: 0 | Status: DISCONTINUED | OUTPATIENT
Start: 2019-04-11 | End: 2019-04-15

## 2019-04-11 RX ORDER — SODIUM CHLORIDE 9 MG/ML
1000 INJECTION, SOLUTION INTRAVENOUS
Qty: 0 | Refills: 0 | Status: DISCONTINUED | OUTPATIENT
Start: 2019-04-11 | End: 2019-04-12

## 2019-04-11 RX ORDER — SODIUM CHLORIDE 9 MG/ML
1000 INJECTION, SOLUTION INTRAVENOUS
Qty: 0 | Refills: 0 | Status: DISCONTINUED | OUTPATIENT
Start: 2019-04-11 | End: 2019-04-15

## 2019-04-11 RX ORDER — INSULIN LISPRO 100/ML
VIAL (ML) SUBCUTANEOUS
Qty: 0 | Refills: 0 | Status: DISCONTINUED | OUTPATIENT
Start: 2019-04-11 | End: 2019-04-13

## 2019-04-11 RX ORDER — GLUCAGON INJECTION, SOLUTION 0.5 MG/.1ML
1 INJECTION, SOLUTION SUBCUTANEOUS ONCE
Qty: 0 | Refills: 0 | Status: DISCONTINUED | OUTPATIENT
Start: 2019-04-11 | End: 2019-04-15

## 2019-04-11 RX ORDER — INSULIN LISPRO 100/ML
10 VIAL (ML) SUBCUTANEOUS ONCE
Qty: 0 | Refills: 0 | Status: COMPLETED | OUTPATIENT
Start: 2019-04-11 | End: 2019-04-11

## 2019-04-11 RX ADMIN — Medication 10 UNIT(S): at 15:47

## 2019-04-11 RX ADMIN — Medication 1: at 20:50

## 2019-04-11 RX ADMIN — Medication 5 UNIT(S): at 18:21

## 2019-04-11 RX ADMIN — SODIUM CHLORIDE 100 MILLILITER(S): 9 INJECTION, SOLUTION INTRAVENOUS at 20:11

## 2019-04-11 RX ADMIN — ATORVASTATIN CALCIUM 80 MILLIGRAM(S): 80 TABLET, FILM COATED ORAL at 21:57

## 2019-04-11 RX ADMIN — INSULIN GLARGINE 29 UNIT(S): 100 INJECTION, SOLUTION SUBCUTANEOUS at 20:50

## 2019-04-11 RX ADMIN — DIVALPROEX SODIUM 750 MILLIGRAM(S): 500 TABLET, DELAYED RELEASE ORAL at 21:58

## 2019-04-11 RX ADMIN — SODIUM CHLORIDE 2000 MILLILITER(S): 9 INJECTION INTRAMUSCULAR; INTRAVENOUS; SUBCUTANEOUS at 15:31

## 2019-04-11 RX ADMIN — HALOPERIDOL DECANOATE 20 MILLIGRAM(S): 100 INJECTION INTRAMUSCULAR at 21:57

## 2019-04-11 NOTE — H&P ADULT - PROBLEM SELECTOR PLAN 1
-FSGs >500 at admission, trending down, >>380, in setting of insulin non-adhereance, DM2  -small anion gap, normal pH, CO2 WNL, ctm, f/u repeat BMP  -unlikely/will develop DKA  -elevated hydroxybutarate  -gave humalog 15u, and home lantus 29 u  -DM protocol, ISS, 29u lantus/15u humalog TID, ctm -FSGs >500 at admission, trending down, >>380, likely in setting of insulin non-adhereance, but will need collateral information from family. ECG with TWIs, will monitor on telemetry and obtain TPN. No obvious sign of infection to account for hyperglycemia.  -small anion gap, normal pH, CO2 WNL, ctm, f/u repeat BMP  -labs not consistent with DKA  -elevated hydroxybutarate  -gave humalog 15u, and home lantus 29 u  -DM protocol, ISS, 29u lantus/15u humalog TID, ctm

## 2019-04-11 NOTE — H&P ADULT - ASSESSMENT
The patient is a 28 y/o man with PMH significant for NSTEMI (1/2019 on DAPT), MR, DM2 BIBEMS for hyperglycemia. r/o cardiac event.

## 2019-04-11 NOTE — ED PROVIDER NOTE - ATTENDING CONTRIBUTION TO CARE
LOIDA HELLER MD: Reviewed and agree with the HPI as documented below:   28 yo M past medical history of mild MR and DM (non-adherent with insulin as per mom) brought in by EMS after being found to be hyperglycemic at home today.  Patient states he has constant thirst and frequent urination.  Patient denies HA, NP, chest pain, SOB, cough, abd pain, N/V/D/C, weakness, dizziness, other urinary symptoms, extremity pain or swelling or other complaints.     PHYSICAL EXAM:  Vital signs reviewed.  GENERAL: Patient is awake and alert and in no acute distress.  Non-toxic appearing.  A+Ox4.  Obese.  Mildly dry mucous membranes.  HEAD:  Airway patent.  No oropharyngeal edema.  No stridor.  Auricles are normal.    EYES: EOM grossly intact, conjunctiva non-injected and sclera clear  NECK: Supple, No vertebral point tenderness to palpation.  CHEST/LUNG: Lungs clear to auscultation bilaterally; no wheeze, no rhonchi,  no rales.    HEART: Regular rate and rhythm;   ABDOMEN: Soft, non-tender to palpation.  No rebound/no guarding.  Bowel sounds present x 4.   MSK/EXTREMITIES: No clubbing or cyanosis. Back is nontender, with no vertebral point tenderness to palpation, no CVAT.  Moving all 4 extremities.     NEURO: Neurologically grossly intact.   No obvious deficits.   PSYCH: Psychiatrically normal mood and affect.  No apparent risk to self or others.       DR. ROSAS, ATTENDING MD:    I performed a face to face bedside interview with patient regarding history of present illness, review of symptoms and past medical history. I completed an independent physical exam.  I have discussed patient's plan of care with the team of health care providers.   I agree with note as stated above, having amended the EMR as needed to reflect my findings. I have discussed the assessment and plan of care.  This includes during the time I functioned as the attending physician for this patient.

## 2019-04-11 NOTE — H&P ADULT - HISTORY OF PRESENT ILLNESS
The patient is a 30 y/o man with PMH significant for NSTEMI (1/2019 on DAPT), MR, DM2 BIBEMS for hyperglycemia. Per chart patient did not take insulin today. When asked whether he took insulin, says no. Is tearful at times. Answers most questions yes or no but interview limited due to patient's intellectual disability. Denied chest pain, nausea, vomiting, change in bowel or urinary habits, fever, weight loss.

## 2019-04-11 NOTE — ED ADULT TRIAGE NOTE - CHIEF COMPLAINT QUOTE
pt bibems from home, mom called 911, pt non complaint with medications, fs HI in field. pmhx: iddm, mr pt bibems from home, mom called 911, pt non complaint with medications, fs HI in field. pmhx: iddm, mr, bipolar, schizoaffective

## 2019-04-11 NOTE — H&P ADULT - PROBLEM SELECTOR PLAN 4
-pt with NSTEMI in 1/19 on DOAC  -EKG stat to look for changes in setting of stress trigger for hyperglycemia  -f/u r/o work up  -DVT ppx SCDs

## 2019-04-11 NOTE — ED ADULT NURSE NOTE - OBJECTIVE STATEMENT
pt received to room 17 pt is A&0x3 pt is ambulatory. pt comes to ED for HIGH FS. pt does not know when he took his DM meds last. pt VSS. pt has psych hx. pt denies si/hi 20 g placed in L forearm labs were drawn and sent EDMD at bedside for eval awaiting further orders Will continue to monitor the pt

## 2019-04-11 NOTE — H&P ADULT - NSICDXPASTMEDICALHX_GEN_ALL_CORE_FT
PAST MEDICAL HISTORY:  Bipolar 1 disorder     Mental retardation     Obesity     Schizo-affective schizophrenia     Scoliosis

## 2019-04-11 NOTE — ED ADULT NURSE NOTE - CHIEF COMPLAINT QUOTE
pt bibems from home, mom called 911, pt non complaint with medications, fs HI in field. pmhx: iddm, mr, bipolar, schizoaffective

## 2019-04-11 NOTE — H&P ADULT - ATTENDING COMMENTS
29 M h/o T2DM on insulin, h/o NSTEMI, presents with hyperglycemia. Presume non-adherence as etiology but history is limited by patient's intellectual delay. Labs inconsistent with DKA. Will resume SQ insulin and obtain collateral history from family. Will also monitor on telemetry and obtain hsTPN given TWIs on ECG. No obvious e/o infection to account for hyperglycemia.    I examined this patient and discussed their management with house staff on 4/11/2019.

## 2019-04-11 NOTE — H&P ADULT - NSHPSOCIALHISTORY_GEN_ALL_CORE
Lives with mother, assume primary caregiver. Unclear why does not help patient with insulin administration. Denies tobacco, EtOH, drug use.

## 2019-04-11 NOTE — H&P ADULT - NSHPPHYSICALEXAM_GEN_ALL_CORE
GENERAL: NAD  HEENT: PERRL, no oropharyngeal lesions or erythema appreciated, dry oral mucosa  Pulm: normal work of breathing, CTABL  CV: RRR, S1&S2+, no m/r/g appreciated  ABDOMEN: soft, nt, nd, no hepatosplenomegaly  MSK: nl ROM  EXTREMITIES:  no appreciable edema in b/l LE  Neuro: unable to assess due to patient mental status  SKIN: warm and dry, no visible rash GENERAL: NAD, lying in bed, pleasant  HEENT: PERRL, no oropharyngeal lesions or erythema appreciated, dry oral mucosa  Pulm: normal work of breathing, CTABL  CV: RRR, S1&S2+, no m/r/g appreciated  ABDOMEN: soft, nt, nd, no hepatosplenomegaly  MSK: nl ROM  EXTREMITIES:  no appreciable edema in b/l LE  Neuro: does not follow commands but is spontaneously moving all extremities  SKIN: warm and dry, no visible rash  Psych: Alert to self, place

## 2019-04-11 NOTE — ED PROVIDER NOTE - CLINICAL SUMMARY MEDICAL DECISION MAKING FREE TEXT BOX
Jonathan Weil, PGY2 - hyperglycemia but relatively asymptomatic. Labs to assess for DKA, admit given severity of hyperglycemia and issues with med compliance

## 2019-04-11 NOTE — H&P ADULT - PROBLEM SELECTOR PLAN 3
-chronic, stable, patient with intellectual disability  -obtain collateral information, talk to family  -social work, to assist with medication adhereance, home situation -chronic, stable, patient with intellectual disability  -obtain collateral information, talk to family  -social work, to assist with medication adherence, home situation

## 2019-04-11 NOTE — ED ADULT NURSE NOTE - NSIMPLEMENTINTERV_GEN_ALL_ED
Implemented All Fall Risk Interventions:  [place] to call system. Call bell, personal items and telephone within reach. Instruct patient to call for assistance. Room bathroom lighting operational. Non-slip footwear when patient is off stretcher. Physically safe environment: no spills, clutter or unnecessary equipment. Stretcher in lowest position, wheels locked, appropriate side rails in place. Provide visual cue, wrist band, yellow gown, etc. Monitor gait and stability. Monitor for mental status changes and reorient to person, place, and time. Review medications for side effects contributing to fall risk. Reinforce activity limits and safety measures with patient and family.

## 2019-04-11 NOTE — H&P ADULT - NSHPREVIEWOFSYSTEMS_GEN_ALL_CORE
REVIEW OF SYSTEMS:    CONSTITUTIONAL: No weakness, fevers or chills  EYES/ENT: No visual changes;  no throat pain   NECK: No pain or stiffness  RESPIRATORY: No cough, wheezing, hemoptysis; No shortness of breath  CARDIOVASCULAR: No chest pain or palpitations  GASTROINTESTINAL: No abdominal or epigastric pain. No nausea, vomiting, or hematemesis; No diarrhea or constipation. No melena or hematochezia.  GENITOURINARY: No dysuria, change in frequency or hematuria  NEUROLOGICAL: No numbness or weakness  SKIN: No itching, burning, rashes, or lesions   All other review of systems is negative unless indicated above. Unable to obtain 2/2 intellectual delay

## 2019-04-11 NOTE — ED PROVIDER NOTE - OBJECTIVE STATEMENT
29M hx MR/DM BIBA after found to be hyperglycemic at home today. Per triage/EMS notes his mother reported he was non-compliant with his insulin. No symptoms reported. Patient says he feels well though he endorses polyuria. History is limited from the patient due to issues understanding questions.

## 2019-04-12 DIAGNOSIS — E78.49 OTHER HYPERLIPIDEMIA: ICD-10-CM

## 2019-04-12 LAB
ANION GAP SERPL CALC-SCNC: 12 MMO/L — SIGNIFICANT CHANGE UP (ref 7–14)
BUN SERPL-MCNC: 7 MG/DL — SIGNIFICANT CHANGE UP (ref 7–23)
CALCIUM SERPL-MCNC: 8.8 MG/DL — SIGNIFICANT CHANGE UP (ref 8.4–10.5)
CHLORIDE SERPL-SCNC: 100 MMOL/L — SIGNIFICANT CHANGE UP (ref 98–107)
CO2 SERPL-SCNC: 24 MMOL/L — SIGNIFICANT CHANGE UP (ref 22–31)
CREAT SERPL-MCNC: 0.66 MG/DL — SIGNIFICANT CHANGE UP (ref 0.5–1.3)
GLUCOSE SERPL-MCNC: 261 MG/DL — HIGH (ref 70–99)
HBA1C BLD-MCNC: 17.1 % — HIGH (ref 4–5.6)
MAGNESIUM SERPL-MCNC: 1.6 MG/DL — SIGNIFICANT CHANGE UP (ref 1.6–2.6)
PHOSPHATE SERPL-MCNC: 3.3 MG/DL — SIGNIFICANT CHANGE UP (ref 2.5–4.5)
POTASSIUM SERPL-MCNC: 3.3 MMOL/L — LOW (ref 3.5–5.3)
POTASSIUM SERPL-SCNC: 3.3 MMOL/L — LOW (ref 3.5–5.3)
SODIUM SERPL-SCNC: 136 MMOL/L — SIGNIFICANT CHANGE UP (ref 135–145)

## 2019-04-12 PROCEDURE — 93306 TTE W/DOPPLER COMPLETE: CPT | Mod: 26

## 2019-04-12 PROCEDURE — 99223 1ST HOSP IP/OBS HIGH 75: CPT | Mod: GC

## 2019-04-12 PROCEDURE — 99233 SBSQ HOSP IP/OBS HIGH 50: CPT | Mod: GC

## 2019-04-12 RX ORDER — INSULIN GLARGINE 100 [IU]/ML
29 INJECTION, SOLUTION SUBCUTANEOUS AT BEDTIME
Qty: 0 | Refills: 0 | Status: DISCONTINUED | OUTPATIENT
Start: 2019-04-12 | End: 2019-04-12

## 2019-04-12 RX ORDER — POTASSIUM CHLORIDE 20 MEQ
20 PACKET (EA) ORAL
Qty: 0 | Refills: 0 | Status: COMPLETED | OUTPATIENT
Start: 2019-04-12 | End: 2019-04-13

## 2019-04-12 RX ORDER — POTASSIUM CHLORIDE 20 MEQ
40 PACKET (EA) ORAL ONCE
Qty: 0 | Refills: 0 | Status: COMPLETED | OUTPATIENT
Start: 2019-04-11 | End: 2019-04-12

## 2019-04-12 RX ORDER — ACETAMINOPHEN 500 MG
650 TABLET ORAL ONCE
Qty: 0 | Refills: 0 | Status: DISCONTINUED | OUTPATIENT
Start: 2019-04-12 | End: 2019-04-12

## 2019-04-12 RX ORDER — ACETAMINOPHEN 500 MG
650 TABLET ORAL ONCE
Qty: 0 | Refills: 0 | Status: DISCONTINUED | OUTPATIENT
Start: 2019-04-12 | End: 2019-04-15

## 2019-04-12 RX ORDER — INSULIN GLARGINE 100 [IU]/ML
37 INJECTION, SOLUTION SUBCUTANEOUS AT BEDTIME
Qty: 0 | Refills: 0 | Status: DISCONTINUED | OUTPATIENT
Start: 2019-04-12 | End: 2019-04-13

## 2019-04-12 RX ADMIN — Medication 81 MILLIGRAM(S): at 13:13

## 2019-04-12 RX ADMIN — Medication 15 UNIT(S): at 13:14

## 2019-04-12 RX ADMIN — Medication 1 MILLIGRAM(S): at 18:47

## 2019-04-12 RX ADMIN — DIVALPROEX SODIUM 750 MILLIGRAM(S): 500 TABLET, DELAYED RELEASE ORAL at 18:49

## 2019-04-12 RX ADMIN — Medication 2: at 09:19

## 2019-04-12 RX ADMIN — HALOPERIDOL DECANOATE 20 MILLIGRAM(S): 100 INJECTION INTRAMUSCULAR at 22:16

## 2019-04-12 RX ADMIN — DIVALPROEX SODIUM 750 MILLIGRAM(S): 500 TABLET, DELAYED RELEASE ORAL at 05:43

## 2019-04-12 RX ADMIN — Medication 20 MILLIEQUIVALENT(S): at 20:32

## 2019-04-12 RX ADMIN — ATORVASTATIN CALCIUM 80 MILLIGRAM(S): 80 TABLET, FILM COATED ORAL at 22:16

## 2019-04-12 RX ADMIN — Medication 15 UNIT(S): at 18:50

## 2019-04-12 RX ADMIN — INSULIN GLARGINE 37 UNIT(S): 100 INJECTION, SOLUTION SUBCUTANEOUS at 22:17

## 2019-04-12 RX ADMIN — Medication 40 MILLIEQUIVALENT(S): at 05:43

## 2019-04-12 RX ADMIN — CLOPIDOGREL BISULFATE 75 MILLIGRAM(S): 75 TABLET, FILM COATED ORAL at 13:13

## 2019-04-12 RX ADMIN — Medication 2: at 13:14

## 2019-04-12 RX ADMIN — Medication 15 UNIT(S): at 09:19

## 2019-04-12 RX ADMIN — Medication 2: at 18:49

## 2019-04-12 RX ADMIN — Medication 1 MILLIGRAM(S): at 05:43

## 2019-04-12 NOTE — PROGRESS NOTE ADULT - PROBLEM SELECTOR PLAN 3
-chronic, stable, patient with intellectual disability  -obtain collateral information, talk to family  -social work, to assist with medication adherance, home situation -chronic, stable, patient with intellectual disability  -obtain collateral information, talk to family  -social work, to assist with medication adherence, home situation

## 2019-04-12 NOTE — PROGRESS NOTE ADULT - PROBLEM SELECTOR PLAN 1
-FSGs >500 at admission, trending down, >>187, in setting of insulin non-adhereance, DM2  -small AG resolved  -unlikely to develop DKA  -c/w home lantus 29u/humalog 15u premeal  -DM protocol, ISS, ctm  -social work for medication adherance -FSGs >500 at admission, trending down, >>187, in setting of insulin non-adherence, DM2  -small AG resolved  -unlikely to develop DKA  -c/w home lantus 29u/humalog 15u premeal  -DM protocol, ISS, ctm  -social work for medication adherance

## 2019-04-12 NOTE — CONSULT NOTE ADULT - ASSESSMENT
28 year old man with T2DM, uncontrolled, admitted with DKA. 29 year old man with MR and ketosis prone T2DM c/b CAD, uncontrolled A1C 17, admitted with hyperglceymia in 700s noted to be ketosic not acidotic. Patient is in a poor social situation. Has father with brain cancer and mother (who has poor health liertacy) is not able to care for him aprrotiately. 29 year old man with MR and ketosis prone T2DM c/b CAD, uncontrolled A1C 17, admitted with hyperglycemia in 700s noted to be ketotic not acidotic. Patient is in a poor social situation. Has father with brain cancer and mother (who has poor health literacy) is not able to care for him appropriately.

## 2019-04-12 NOTE — PROGRESS NOTE ADULT - ATTENDING COMMENTS
29 M h/o T2DM on insulin, h/o NSTEMI, presents with hyperglycemia. Presume non-adherence as etiology but history is limited by patient's intellectual delay. Labs inconsistent with DKA. FS glucose improved, but patient with uncontrolled DM (A1c>10%). Patient states sister assists with medication administration; A1c suggests he has not been receiving insulin or it is not being administered properly. Will obtain SW and endocrine consults to optimize insulin dosing and ensure safe discharge.    No events on telemetry overnight. hsTPN wnl x2. Low suspicion for ACS. TTE returned wnl. Will d/c telemetry.

## 2019-04-12 NOTE — CONSULT NOTE ADULT - SUBJECTIVE AND OBJECTIVE BOX
HPI:  The patient is a 30 y/o man with PMH significant for NSTEMI (1/2019 on DAPT), MR, DM2 BIBEMS for hyperglycemia. Per chart patient did not take insulin today. When asked whether he took insulin, says no. Is tearful at times. Answers most questions yes or no but interview limited due to patient's intellectual disability. Denied chest pain, nausea, vomiting, change in bowel or urinary habits, fever, weight loss. (11 Apr 2019 19:02)      PAST MEDICAL & SURGICAL HISTORY:  Schizo-affective schizophrenia  Bipolar 1 disorder  Obesity  Scoliosis  Mental retardation  No significant past surgical history      FAMILY HISTORY:  No pertinent family history in first degree relatives      Social History:    Outpatient Medications:    MEDICATIONS  (STANDING):  aspirin  chewable 81 milliGRAM(s) Oral daily  atorvastatin 80 milliGRAM(s) Oral at bedtime  benztropine 1 milliGRAM(s) Oral two times a day  clopidogrel Tablet 75 milliGRAM(s) Oral daily  dextrose 5%. 1000 milliLiter(s) (50 mL/Hr) IV Continuous <Continuous>  dextrose 50% Injectable 12.5 Gram(s) IV Push once  dextrose 50% Injectable 25 Gram(s) IV Push once  dextrose 50% Injectable 25 Gram(s) IV Push once  diVALproex  milliGRAM(s) Oral two times a day  haloperidol     Tablet 20 milliGRAM(s) Oral at bedtime  insulin glargine Injectable (LANTUS) 29 Unit(s) SubCutaneous at bedtime  insulin lispro (HumaLOG) corrective regimen sliding scale   SubCutaneous three times a day before meals  insulin lispro Injectable (HumaLOG) 15 Unit(s) SubCutaneous three times a day before meals  lactated ringers. 1000 milliLiter(s) (100 mL/Hr) IV Continuous <Continuous>    MEDICATIONS  (PRN):  dextrose 40% Gel 15 Gram(s) Oral once PRN Blood Glucose LESS THAN 70 milliGRAM(s)/deciliter  glucagon  Injectable 1 milliGRAM(s) IntraMuscular once PRN Glucose LESS THAN 70 milligrams/deciliter      Allergies    No Known Allergies    Intolerances      Review of Systems:  Constitutional: No fever  Eyes: No blurry vision  Neuro: No tremors  HEENT: No pain  Cardiovascular: No chest pain, palpitations  Respiratory: No SOB, no cough  GI: No nausea, vomiting, abdominal pain  : No dysuria  Skin: no rash  Psych: no depression  Endocrine: no polyuria, polydipsia  Hem/lymph: no swelling  Osteoporosis: no fractures    ALL OTHER SYSTEMS REVIEWED AND NEGATIVE    UNABLE TO OBTAIN    PHYSICAL EXAM:  VITALS: T(C): 36.7 (04-12-19 @ 05:41)  T(F): 98 (04-12-19 @ 05:41), Max: 98.2 (04-11-19 @ 14:17)  HR: 76 (04-12-19 @ 05:41) (73 - 86)  BP: 114/76 (04-12-19 @ 05:41) (109/72 - 145/91)  RR:  (16 - 17)  SpO2:  (96% - 100%)  Wt(kg): --  GENERAL: NAD, well-groomed, well-developed  EYES: No proptosis, no lid lag, anicteric  HEENT:  Atraumatic, Normocephalic, moist mucous membranes  THYROID: Normal size, no palpable nodules  RESPIRATORY: Clear to auscultation bilaterally; No rales, rhonchi, wheezing, or rubs  CARDIOVASCULAR: Regular rate and rhythm; No murmurs; no peripheral edema  GI: Soft, nontender, non distended, normal bowel sounds  SKIN: Dry, intact, No rashes or lesions  MUSCULOSKELETAL: Full range of motion, normal strength  NEURO: sensation intact, extraocular movements intact, no tremor, normal reflexes  PSYCH: Alert and oriented x 3, normal affect, normal mood  CUSHING'S SIGNS: no striae    POCT Blood Glucose.: 235 mg/dL (04-12-19 @ 12:35)  POCT Blood Glucose.: 250 mg/dL (04-12-19 @ 09:18)  POCT Blood Glucose.: 187 mg/dL (04-11-19 @ 21:41)  POCT Blood Glucose.: 181 mg/dL (04-11-19 @ 20:49)  POCT Blood Glucose.: 381 mg/dL (04-11-19 @ 18:03)  POCT Blood Glucose.: 406 mg/dL (04-11-19 @ 17:00)  POCT Blood Glucose.: 550 mg/dL (04-11-19 @ 15:42)  POCT Blood Glucose.: >600 mg/dL (04-11-19 @ 14:25)                            15.3   7.24  )-----------( 177      ( 11 Apr 2019 15:03 )             45.9       04-12    136  |  100  |  7   ----------------------------<  261<H>  3.3<L>   |  24  |  0.66    EGFR if : 153  EGFR if non : 132    Ca    8.8      04-12  Mg     1.6     04-12  Phos  3.3     04-12    TPro  7.4  /  Alb  4.2  /  TBili  0.2  /  DBili  x   /  AST  24  /  ALT  14  /  AlkPhos  72  04-11      Thyroid Function Tests:      Hemoglobin A1C, Whole Blood: 17.1 % <H> [4.0 - 5.6] (04-12-19 @ 07:55)  Hemoglobin A1C, Whole Blood: Test not performed CALC? ERROR BY ANALYZER NOTIFIED ,Y % [4.0 - 5.6] (04-11-19 @ 15:03)          Radiology: HPI:  The patient is a 28 y/o man with PMH significant for NSTEMI (1/2019 on DAPT), MR, DM2 BIBEMS for hyperglycemia. Per chart patient did not take insulin today. When asked whether he took insulin, says no. Is tearful at times. Answers most questions yes or no but interview limited due to patient's intellectual disability. Denied chest pain, nausea, vomiting, change in bowel or urinary habits, fever, weight loss. (11 Apr 2019 19:02)    Endocrine History: 29 yr M with T2DM, MR brought in for hyperglcyemia. Minh has been diagnosed with ketosis prone T2DM c/b CAD and he follows with Dr Ha (PMD). He was seen by our team in Jan 2019 when he was hoslaized for DKA. On this amdiion minh noted angelia ketotis not acidotic with A1C 17.1. Minh is cared for by his father (who had bran CA) adb mother. His mother and sister take turns givign minh his insulin. Moter states that hse dial ups Levemir to maximal dose and gives ti to mapteitn ta ngiht. Minh is cared for by his fatehr leonel the day. He has a nresitcted diet when with hios father cocnsiting of cakes, cookies and fristed flakes. [facility] but he himslef has memory imaprient due to the brain cancer. He saw otpho 1 month ago no reitnopathy. He does not have his glcuose levels chekec at hoem. Motehr states that recently they stopped givign him insulin as they felt it was not working.           PAST MEDICAL & SURGICAL HISTORY:  Schizo-affective schizophrenia  Bipolar 1 disorder  Obesity  Scoliosis  Mental retardation  No significant past surgical history      FAMILY HISTORY:  No pertinent family history in first degree relatives      Social History: Lives with mother    Outpatient Medications:  	    · 	Insulin Pen Needles, 4mm: 1 application subcutaneously 4 times a day. ** Use with insulin pen **   · 	Levemir FlexTouch 100 units/mL subcutaneous solution: 60 unit(s) subcutaneous once a day at night   	  	Please Dispense 3 pens  · 	test strips (per patient's insurance): 1 application subcutaneously 4 times a day. ** Compatible with patient's glucometer **  · 	lancets: 1 application subcutaneously 4 times a day   · 	alcohol swabs : Apply topically to affected area 4 times a day   · 	glucometer (per patient's insurance): Test blood sugars four times a day. Dispense #1 glucometer.  · 	haloperidol 20 mg oral tablet: 1 tab(s) orally once a day (at bedtime)  · 	aspirin 81 mg oral tablet, chewable: 1 tab(s) orally once a day  · 	clopidogrel 75 mg oral tablet: 1 tab(s) orally once a day  · 	atorvastatin 80 mg oral tablet: 1 tab(s) orally once a day (at bedtime)  · 	divalproex sodium 250 mg oral delayed release tablet: 3 tab(s) orally 2 times a day   · 	lisinopril 5 mg oral tablet: 1 tab(s) orally once a day  · 	benztropine 1 mg oral tablet: 1 tab(s) orally 2 times a day    .      MEDICATIONS  (STANDING):  aspirin  chewable 81 milliGRAM(s) Oral daily  atorvastatin 80 milliGRAM(s) Oral at bedtime  benztropine 1 milliGRAM(s) Oral two times a day  clopidogrel Tablet 75 milliGRAM(s) Oral daily  dextrose 5%. 1000 milliLiter(s) (50 mL/Hr) IV Continuous <Continuous>  dextrose 50% Injectable 12.5 Gram(s) IV Push once  dextrose 50% Injectable 25 Gram(s) IV Push once  dextrose 50% Injectable 25 Gram(s) IV Push once  diVALproex  milliGRAM(s) Oral two times a day  haloperidol     Tablet 20 milliGRAM(s) Oral at bedtime  insulin glargine Injectable (LANTUS) 29 Unit(s) SubCutaneous at bedtime  insulin lispro (HumaLOG) corrective regimen sliding scale   SubCutaneous three times a day before meals  insulin lispro Injectable (HumaLOG) 15 Unit(s) SubCutaneous three times a day before meals  lactated ringers. 1000 milliLiter(s) (100 mL/Hr) IV Continuous <Continuous>    MEDICATIONS  (PRN):  dextrose 40% Gel 15 Gram(s) Oral once PRN Blood Glucose LESS THAN 70 milliGRAM(s)/deciliter  glucagon  Injectable 1 milliGRAM(s) IntraMuscular once PRN Glucose LESS THAN 70 milligrams/deciliter      Allergies    No Known Allergies    Intolerances      Review of Systems:  Constitutional: No fever  Eyes: No blurry vision  Neuro: No tremors  HEENT: No pain  Cardiovascular: No chest pain, palpitations  Respiratory: No SOB, no cough  GI: No nausea, vomiting, abdominal pain  : No dysuria  Skin: no rash  Psych: no depression  Endocrine: + polyuria, polydipsia  Hem/lymph: no swelling  Osteoporosis: no fractures    ALL OTHER SYSTEMS REVIEWED AND NEGATIVE        PHYSICAL EXAM:  VITALS: T(C): 36.7 (04-12-19 @ 05:41)  T(F): 98 (04-12-19 @ 05:41), Max: 98.2 (04-11-19 @ 14:17)  HR: 76 (04-12-19 @ 05:41) (73 - 86)  BP: 114/76 (04-12-19 @ 05:41) (109/72 - 145/91)  RR:  (16 - 17)  SpO2:  (96% - 100%)  Wt(kg): --  GENERAL: NAD, well-groomed, well-developed  EYES: No proptosis, no lid lag, anicteric  HEENT:  Atraumatic, Normocephalic, moist mucous membranes  THYROID: Normal size, no palpable nodules  RESPIRATORY: Clear to auscultation bilaterally; No rales, rhonchi, wheezing, or rubs  CARDIOVASCULAR: Regular rate and rhythm; No murmurs; no peripheral edema  GI: Soft, nontender, non distended, normal bowel sounds  SKIN: Dry, intact, No rashes or lesions  MUSCULOSKELETAL: Full range of motion, normal strength  NEURO: sensation intact, extraocular movements intact, no tremor, normal reflexes  PSYCH: Alert and oriented x 3      POCT Blood Glucose.: 235 mg/dL (04-12-19 @ 12:35)  POCT Blood Glucose.: 250 mg/dL (04-12-19 @ 09:18)  POCT Blood Glucose.: 187 mg/dL (04-11-19 @ 21:41)  POCT Blood Glucose.: 181 mg/dL (04-11-19 @ 20:49)  POCT Blood Glucose.: 381 mg/dL (04-11-19 @ 18:03)  POCT Blood Glucose.: 406 mg/dL (04-11-19 @ 17:00)  POCT Blood Glucose.: 550 mg/dL (04-11-19 @ 15:42)  POCT Blood Glucose.: >600 mg/dL (04-11-19 @ 14:25)                            15.3   7.24  )-----------( 177      ( 11 Apr 2019 15:03 )             45.9       04-12    136  |  100  |  7   ----------------------------<  261<H>  3.3<L>   |  24  |  0.66    EGFR if : 153  EGFR if non : 132    Ca    8.8      04-12  Mg     1.6     04-12  Phos  3.3     04-12    TPro  7.4  /  Alb  4.2  /  TBili  0.2  /  DBili  x   /  AST  24  /  ALT  14  /  AlkPhos  72  04-11          Hemoglobin A1C, Whole Blood: 17.1 % <H> [4.0 - 5.6] (04-12-19 @ 07:55)  Hemoglobin A1C, Whole Blood: Test not performed CALC? ERROR BY ANALYZER NOTIFIED ,Y % [4.0 - 5.6] (04-11-19 @ 15:03) HPI:  The patient is a 28 y/o man with PMH significant for NSTEMI (1/2019 on DAPT), MR, DM2 BIBEMS for hyperglycemia. Per chart patient did not take insulin today. When asked whether he took insulin, says no. Is tearful at times. Answers most questions yes or no but interview limited due to patient's intellectual disability. Denied chest pain, nausea, vomiting, change in bowel or urinary habits, fever, weight loss. (11 Apr 2019 19:02)    Endocrine History: 29 yr M with T2DM, MR brought in for hyperglycemia. Patient has been diagnosed with ketosis prone T2DM c/b CAD and he follows with Dr Ha (PMD). He was seen by our team in Jan 2019 when he was hospitalized for DKA. On this admission, patient noted to be ketotic not acidotic with A1C 17.1. Patient is cared for by his father (who had brain CA) and mother. His mother and sister take turns giving patient his insulin. Mother states that she dials up Levemir to maximal dose and gives it to patient every night. Patient is cared for by his father during the day. He has a restricted diet when with his father consisting of cakes, cookies and frosted flakes. His father has been reminded several times that patient has DM but he himself has memory impairment due to the brain cancer. He saw otpho 1 month ago no reitnopathy. He does not have his glcuose levels chekec at hoem. Motehr states that recently they stopped givign him insulin as they felt it was not working.           PAST MEDICAL & SURGICAL HISTORY:  Schizo-affective schizophrenia  Bipolar 1 disorder  Obesity  Scoliosis  Mental retardation  No significant past surgical history      FAMILY HISTORY:  No pertinent family history in first degree relatives      Social History: Lives with mother    Outpatient Medications:  	    · 	Insulin Pen Needles, 4mm: 1 application subcutaneously 4 times a day. ** Use with insulin pen **   · 	Levemir FlexTouch 100 units/mL subcutaneous solution: 60 unit(s) subcutaneous once a day at night   	  	Please Dispense 3 pens  · 	test strips (per patient's insurance): 1 application subcutaneously 4 times a day. ** Compatible with patient's glucometer **  · 	lancets: 1 application subcutaneously 4 times a day   · 	alcohol swabs : Apply topically to affected area 4 times a day   · 	glucometer (per patient's insurance): Test blood sugars four times a day. Dispense #1 glucometer.  · 	haloperidol 20 mg oral tablet: 1 tab(s) orally once a day (at bedtime)  · 	aspirin 81 mg oral tablet, chewable: 1 tab(s) orally once a day  · 	clopidogrel 75 mg oral tablet: 1 tab(s) orally once a day  · 	atorvastatin 80 mg oral tablet: 1 tab(s) orally once a day (at bedtime)  · 	divalproex sodium 250 mg oral delayed release tablet: 3 tab(s) orally 2 times a day   · 	lisinopril 5 mg oral tablet: 1 tab(s) orally once a day  · 	benztropine 1 mg oral tablet: 1 tab(s) orally 2 times a day    .      MEDICATIONS  (STANDING):  aspirin  chewable 81 milliGRAM(s) Oral daily  atorvastatin 80 milliGRAM(s) Oral at bedtime  benztropine 1 milliGRAM(s) Oral two times a day  clopidogrel Tablet 75 milliGRAM(s) Oral daily  dextrose 5%. 1000 milliLiter(s) (50 mL/Hr) IV Continuous <Continuous>  dextrose 50% Injectable 12.5 Gram(s) IV Push once  dextrose 50% Injectable 25 Gram(s) IV Push once  dextrose 50% Injectable 25 Gram(s) IV Push once  diVALproex  milliGRAM(s) Oral two times a day  haloperidol     Tablet 20 milliGRAM(s) Oral at bedtime  insulin glargine Injectable (LANTUS) 29 Unit(s) SubCutaneous at bedtime  insulin lispro (HumaLOG) corrective regimen sliding scale   SubCutaneous three times a day before meals  insulin lispro Injectable (HumaLOG) 15 Unit(s) SubCutaneous three times a day before meals  lactated ringers. 1000 milliLiter(s) (100 mL/Hr) IV Continuous <Continuous>    MEDICATIONS  (PRN):  dextrose 40% Gel 15 Gram(s) Oral once PRN Blood Glucose LESS THAN 70 milliGRAM(s)/deciliter  glucagon  Injectable 1 milliGRAM(s) IntraMuscular once PRN Glucose LESS THAN 70 milligrams/deciliter      Allergies    No Known Allergies    Intolerances      Review of Systems:  Constitutional: No fever  Eyes: No blurry vision  Neuro: No tremors  HEENT: No pain  Cardiovascular: No chest pain, palpitations  Respiratory: No SOB, no cough  GI: No nausea, vomiting, abdominal pain  : No dysuria  Skin: no rash  Psych: no depression  Endocrine: + polyuria, polydipsia  Hem/lymph: no swelling  Osteoporosis: no fractures    ALL OTHER SYSTEMS REVIEWED AND NEGATIVE        PHYSICAL EXAM:  VITALS: T(C): 36.7 (04-12-19 @ 05:41)  T(F): 98 (04-12-19 @ 05:41), Max: 98.2 (04-11-19 @ 14:17)  HR: 76 (04-12-19 @ 05:41) (73 - 86)  BP: 114/76 (04-12-19 @ 05:41) (109/72 - 145/91)  RR:  (16 - 17)  SpO2:  (96% - 100%)  Wt(kg): --  GENERAL: NAD, well-groomed, well-developed  EYES: No proptosis, no lid lag, anicteric  HEENT:  Atraumatic, Normocephalic, moist mucous membranes  THYROID: Normal size, no palpable nodules  RESPIRATORY: Clear to auscultation bilaterally; No rales, rhonchi, wheezing, or rubs  CARDIOVASCULAR: Regular rate and rhythm; No murmurs; no peripheral edema  GI: Soft, nontender, non distended, normal bowel sounds  SKIN: Dry, intact, No rashes or lesions  MUSCULOSKELETAL: Full range of motion, normal strength  NEURO: sensation intact, extraocular movements intact, no tremor, normal reflexes  PSYCH: Alert and oriented x 3      POCT Blood Glucose.: 235 mg/dL (04-12-19 @ 12:35)  POCT Blood Glucose.: 250 mg/dL (04-12-19 @ 09:18)  POCT Blood Glucose.: 187 mg/dL (04-11-19 @ 21:41)  POCT Blood Glucose.: 181 mg/dL (04-11-19 @ 20:49)  POCT Blood Glucose.: 381 mg/dL (04-11-19 @ 18:03)  POCT Blood Glucose.: 406 mg/dL (04-11-19 @ 17:00)  POCT Blood Glucose.: 550 mg/dL (04-11-19 @ 15:42)  POCT Blood Glucose.: >600 mg/dL (04-11-19 @ 14:25)                            15.3   7.24  )-----------( 177      ( 11 Apr 2019 15:03 )             45.9       04-12    136  |  100  |  7   ----------------------------<  261<H>  3.3<L>   |  24  |  0.66    EGFR if : 153  EGFR if non : 132    Ca    8.8      04-12  Mg     1.6     04-12  Phos  3.3     04-12    TPro  7.4  /  Alb  4.2  /  TBili  0.2  /  DBili  x   /  AST  24  /  ALT  14  /  AlkPhos  72  04-11          Hemoglobin A1C, Whole Blood: 17.1 % <H> [4.0 - 5.6] (04-12-19 @ 07:55)  Hemoglobin A1C, Whole Blood: Test not performed CALC? ERROR BY ANALYZER NOTIFIED ,Y % [4.0 - 5.6] (04-11-19 @ 15:03) HPI:  The patient is a 30 y/o man with PMH significant for NSTEMI (1/2019 on DAPT), MR, DM2 BIBEMS for hyperglycemia. Per chart patient did not take insulin today. When asked whether he took insulin, says no. Is tearful at times. Answers most questions yes or no but interview limited due to patient's intellectual disability. Denied chest pain, nausea, vomiting, change in bowel or urinary habits, fever, weight loss. (11 Apr 2019 19:02)    Endocrine History: 29 yr M with T2DM, MR brought in for hyperglycemia. Patient has been diagnosed with ketosis prone T2DM c/b CAD and he follows with Dr Ha (PMD). He was seen by our team in Jan 2019 when he was hospitalized for DKA. On this admission, patient noted to be ketotic not acidotic with A1C 17.1. Patient is cared for by his father (who had brain CA) and mother. His mother and sister take turns giving patient his insulin. Mother states that she dials up Levemir to maximal dose and gives it to patient every night. Patient is cared for by his father during the day. He has a restricted diet when with his father consisting of cakes, cookies and frosted flakes. His father has been reminded several times that patient has DM but he himself has memory impairment due to the brain cancer. He saw Optho 1 month ago no retinopathy. He does not have his glucose levels checked at home. Mother states that recently they stopped giving him insulin as they felt it was not working.           PAST MEDICAL & SURGICAL HISTORY:  Schizo-affective schizophrenia  Bipolar 1 disorder  Obesity  Scoliosis  Mental retardation  No significant past surgical history      FAMILY HISTORY:  No pertinent family history in first degree relatives      Social History: Lives with mother    Outpatient Medications:  	    · 	Insulin Pen Needles, 4mm: 1 application subcutaneously 4 times a day. ** Use with insulin pen **   · 	Levemir FlexTouch 100 units/mL subcutaneous solution: 80 unit(s) subcutaneous once a day at night   	  	Please Dispense 3 pens  · 	test strips (per patient's insurance): 1 application subcutaneously 4 times a day. ** Compatible with patient's glucometer **  · 	lancets: 1 application subcutaneously 4 times a day   · 	alcohol swabs : Apply topically to affected area 4 times a day   · 	glucometer (per patient's insurance): Test blood sugars four times a day. Dispense #1 glucometer.  · 	haloperidol 20 mg oral tablet: 1 tab(s) orally once a day (at bedtime)  · 	aspirin 81 mg oral tablet, chewable: 1 tab(s) orally once a day  · 	clopidogrel 75 mg oral tablet: 1 tab(s) orally once a day  · 	atorvastatin 80 mg oral tablet: 1 tab(s) orally once a day (at bedtime)  · 	divalproex sodium 250 mg oral delayed release tablet: 3 tab(s) orally 2 times a day   · 	lisinopril 5 mg oral tablet: 1 tab(s) orally once a day  · 	benztropine 1 mg oral tablet: 1 tab(s) orally 2 times a day    .      MEDICATIONS  (STANDING):  aspirin  chewable 81 milliGRAM(s) Oral daily  atorvastatin 80 milliGRAM(s) Oral at bedtime  benztropine 1 milliGRAM(s) Oral two times a day  clopidogrel Tablet 75 milliGRAM(s) Oral daily  dextrose 5%. 1000 milliLiter(s) (50 mL/Hr) IV Continuous <Continuous>  dextrose 50% Injectable 12.5 Gram(s) IV Push once  dextrose 50% Injectable 25 Gram(s) IV Push once  dextrose 50% Injectable 25 Gram(s) IV Push once  diVALproex  milliGRAM(s) Oral two times a day  haloperidol     Tablet 20 milliGRAM(s) Oral at bedtime  insulin glargine Injectable (LANTUS) 29 Unit(s) SubCutaneous at bedtime  insulin lispro (HumaLOG) corrective regimen sliding scale   SubCutaneous three times a day before meals  insulin lispro Injectable (HumaLOG) 15 Unit(s) SubCutaneous three times a day before meals  lactated ringers. 1000 milliLiter(s) (100 mL/Hr) IV Continuous <Continuous>    MEDICATIONS  (PRN):  dextrose 40% Gel 15 Gram(s) Oral once PRN Blood Glucose LESS THAN 70 milliGRAM(s)/deciliter  glucagon  Injectable 1 milliGRAM(s) IntraMuscular once PRN Glucose LESS THAN 70 milligrams/deciliter      Allergies    No Known Allergies    Intolerances      Review of Systems:  Constitutional: No fever  Eyes: No blurry vision  Neuro: No tremors  HEENT: No pain  Cardiovascular: No chest pain, palpitations  Respiratory: No SOB, no cough  GI: No nausea, vomiting, abdominal pain  : No dysuria  Skin: no rash  Psych: no depression  Endocrine: + polyuria, polydipsia  Hem/lymph: no swelling  Osteoporosis: no fractures    ALL OTHER SYSTEMS REVIEWED AND NEGATIVE        PHYSICAL EXAM:  VITALS: T(C): 36.7 (04-12-19 @ 05:41)  T(F): 98 (04-12-19 @ 05:41), Max: 98.2 (04-11-19 @ 14:17)  HR: 76 (04-12-19 @ 05:41) (73 - 86)  BP: 114/76 (04-12-19 @ 05:41) (109/72 - 145/91)  RR:  (16 - 17)  SpO2:  (96% - 100%)  Wt(kg): --  GENERAL: NAD, well-groomed, well-developed  EYES: No proptosis, no lid lag, anicteric  HEENT:  Atraumatic, Normocephalic, moist mucous membranes  THYROID: Normal size, no palpable nodules  RESPIRATORY: Clear to auscultation bilaterally; No rales, rhonchi, wheezing, or rubs  CARDIOVASCULAR: Regular rate and rhythm; No murmurs; no peripheral edema  GI: Soft, nontender, non distended, normal bowel sounds  SKIN: Dry, intact, No rashes or lesions  MUSCULOSKELETAL: Full range of motion, normal strength  NEURO: sensation intact, extraocular movements intact, no tremor, normal reflexes  PSYCH: Alert and oriented x 3      POCT Blood Glucose.: 235 mg/dL (04-12-19 @ 12:35)  POCT Blood Glucose.: 250 mg/dL (04-12-19 @ 09:18)  POCT Blood Glucose.: 187 mg/dL (04-11-19 @ 21:41)  POCT Blood Glucose.: 181 mg/dL (04-11-19 @ 20:49)  POCT Blood Glucose.: 381 mg/dL (04-11-19 @ 18:03)  POCT Blood Glucose.: 406 mg/dL (04-11-19 @ 17:00)  POCT Blood Glucose.: 550 mg/dL (04-11-19 @ 15:42)  POCT Blood Glucose.: >600 mg/dL (04-11-19 @ 14:25)                            15.3   7.24  )-----------( 177      ( 11 Apr 2019 15:03 )             45.9       04-12    136  |  100  |  7   ----------------------------<  261<H>  3.3<L>   |  24  |  0.66    EGFR if : 153  EGFR if non : 132    Ca    8.8      04-12  Mg     1.6     04-12  Phos  3.3     04-12    TPro  7.4  /  Alb  4.2  /  TBili  0.2  /  DBili  x   /  AST  24  /  ALT  14  /  AlkPhos  72  04-11          Hemoglobin A1C, Whole Blood: 17.1 % <H> [4.0 - 5.6] (04-12-19 @ 07:55)  Hemoglobin A1C, Whole Blood: Test not performed CALC? ERROR BY ANALYZER NOTIFIED ,Y % [4.0 - 5.6] (04-11-19 @ 15:03)

## 2019-04-12 NOTE — PROGRESS NOTE ADULT - PROBLEM SELECTOR PLAN 4
-pt with NSTEMI in 1/19 on DOAC  -EKG with t-wave inversions in II, V3, V5, V6, consistent with ischemia  -f/u TTE to assess for motion abnormalities  -cardiac enzymes are not elevated  -c/w ASA, plavix, home HTN and HLD meds  -DVT ppx SCDs

## 2019-04-12 NOTE — CONSULT NOTE ADULT - PROBLEM SELECTOR RECOMMENDATION 9
Recommend basal bolus insulin  Increase lantus to 21 units at bedtime  Start humalog 7 units before meals - (will only give 4 units before dinner today as pt received NPH at 2 pm)  Continue correctional humalog scale  Given DKA on admission and elevated Hba1c, recommend discharge on insulin, regimen to be determined  Attempted to discuss with patient, will need further discussion and education when more cooperative  Discussed with primary team. Recommend basal bolus insulin  Increase lantus to 37 units at bedtime and contineu Nvolog 15U TIDAC  Continue moderate correctional humalog scale  Discahrge on basal/bolus  Socail work consutl for family assitance  Naveed requestign group home palcement Increase lantus to 37 units at bedtime and continue Humalog 15U TIDAC  Continue moderate correctional humalog scale before meals and bedtime  Goal glucose 100-180  Discharge on basal/bolus  Social work consult for family assistance  Mother requesting group home placement  Need to clarify insurance. Will recommend f/u at ENDO Office vs [facility].

## 2019-04-13 ENCOUNTER — TRANSCRIPTION ENCOUNTER (OUTPATIENT)
Age: 29
End: 2019-04-13

## 2019-04-13 LAB
ANION GAP SERPL CALC-SCNC: 10 MMO/L — SIGNIFICANT CHANGE UP (ref 7–14)
BUN SERPL-MCNC: 7 MG/DL — SIGNIFICANT CHANGE UP (ref 7–23)
CALCIUM SERPL-MCNC: 8.4 MG/DL — SIGNIFICANT CHANGE UP (ref 8.4–10.5)
CHLORIDE SERPL-SCNC: 104 MMOL/L — SIGNIFICANT CHANGE UP (ref 98–107)
CO2 SERPL-SCNC: 23 MMOL/L — SIGNIFICANT CHANGE UP (ref 22–31)
CREAT SERPL-MCNC: 0.72 MG/DL — SIGNIFICANT CHANGE UP (ref 0.5–1.3)
GLUCOSE SERPL-MCNC: 275 MG/DL — HIGH (ref 70–99)
MAGNESIUM SERPL-MCNC: 1.8 MG/DL — SIGNIFICANT CHANGE UP (ref 1.6–2.6)
PHOSPHATE SERPL-MCNC: 3.6 MG/DL — SIGNIFICANT CHANGE UP (ref 2.5–4.5)
POTASSIUM SERPL-MCNC: 3.5 MMOL/L — SIGNIFICANT CHANGE UP (ref 3.5–5.3)
POTASSIUM SERPL-SCNC: 3.5 MMOL/L — SIGNIFICANT CHANGE UP (ref 3.5–5.3)
SODIUM SERPL-SCNC: 137 MMOL/L — SIGNIFICANT CHANGE UP (ref 135–145)

## 2019-04-13 PROCEDURE — 99232 SBSQ HOSP IP/OBS MODERATE 35: CPT

## 2019-04-13 PROCEDURE — 99233 SBSQ HOSP IP/OBS HIGH 50: CPT | Mod: GC

## 2019-04-13 RX ORDER — INSULIN LISPRO 100/ML
VIAL (ML) SUBCUTANEOUS
Qty: 0 | Refills: 0 | Status: DISCONTINUED | OUTPATIENT
Start: 2019-04-13 | End: 2019-04-15

## 2019-04-13 RX ORDER — INSULIN LISPRO 100/ML
18 VIAL (ML) SUBCUTANEOUS
Qty: 0 | Refills: 0 | Status: DISCONTINUED | OUTPATIENT
Start: 2019-04-13 | End: 2019-04-15

## 2019-04-13 RX ORDER — INSULIN GLARGINE 100 [IU]/ML
45 INJECTION, SOLUTION SUBCUTANEOUS AT BEDTIME
Qty: 0 | Refills: 0 | Status: DISCONTINUED | OUTPATIENT
Start: 2019-04-13 | End: 2019-04-15

## 2019-04-13 RX ORDER — INSULIN LISPRO 100/ML
VIAL (ML) SUBCUTANEOUS AT BEDTIME
Qty: 0 | Refills: 0 | Status: DISCONTINUED | OUTPATIENT
Start: 2019-04-13 | End: 2019-04-15

## 2019-04-13 RX ORDER — TETANUS AND DIPHTHERIA TOXOIDS ADSORBED 2; 2 [LF]/.5ML; [LF]/.5ML
0.5 INJECTION INTRAMUSCULAR ONCE
Qty: 0 | Refills: 0 | Status: DISCONTINUED | OUTPATIENT
Start: 2019-04-13 | End: 2019-04-15

## 2019-04-13 RX ADMIN — Medication 1 MILLIGRAM(S): at 06:59

## 2019-04-13 RX ADMIN — CLOPIDOGREL BISULFATE 75 MILLIGRAM(S): 75 TABLET, FILM COATED ORAL at 13:09

## 2019-04-13 RX ADMIN — HALOPERIDOL DECANOATE 20 MILLIGRAM(S): 100 INJECTION INTRAMUSCULAR at 22:30

## 2019-04-13 RX ADMIN — Medication 81 MILLIGRAM(S): at 13:09

## 2019-04-13 RX ADMIN — DIVALPROEX SODIUM 750 MILLIGRAM(S): 500 TABLET, DELAYED RELEASE ORAL at 18:26

## 2019-04-13 RX ADMIN — Medication 15 UNIT(S): at 13:09

## 2019-04-13 RX ADMIN — Medication 15 UNIT(S): at 09:17

## 2019-04-13 RX ADMIN — INSULIN GLARGINE 45 UNIT(S): 100 INJECTION, SOLUTION SUBCUTANEOUS at 22:30

## 2019-04-13 RX ADMIN — Medication 3: at 13:10

## 2019-04-13 RX ADMIN — DIVALPROEX SODIUM 750 MILLIGRAM(S): 500 TABLET, DELAYED RELEASE ORAL at 06:59

## 2019-04-13 RX ADMIN — Medication 1 MILLIGRAM(S): at 18:27

## 2019-04-13 RX ADMIN — Medication 18 UNIT(S): at 18:26

## 2019-04-13 RX ADMIN — Medication 3: at 09:17

## 2019-04-13 RX ADMIN — Medication 20 MILLIEQUIVALENT(S): at 09:17

## 2019-04-13 RX ADMIN — ATORVASTATIN CALCIUM 80 MILLIGRAM(S): 80 TABLET, FILM COATED ORAL at 22:33

## 2019-04-13 NOTE — DISCHARGE NOTE PROVIDER - NSDCADMDATE_GEN_ALL_CORE_FT
11-Apr-2019 16:50
rehab potential/predicted duration of therapy intervention/risk reduction/prevention/functional limitations in following categories/therapy frequency/anticipated discharge recommendation/anticipated equipment needs at discharge/impairments found

## 2019-04-13 NOTE — DISCHARGE NOTE PROVIDER - NSDCFUADDAPPT_GEN_ALL_CORE_FT
follow up with Diabetic Educator 5/16/19 at [address] [facility].   follow up with Dr. Connell Endocrinologist 6/26/19 at 2pm at [address] [facility].

## 2019-04-13 NOTE — DISCHARGE NOTE PROVIDER - NSDCCPCAREPLAN_GEN_ALL_CORE_FT
PRINCIPAL DISCHARGE DIAGNOSIS  Diagnosis: Hyperglycemia  Assessment and Plan of Treatment: A1C 17, continue basal/bolus insulin, lantus 45 units at bedtime and humalog 18 units 3 x a day before meals, follow up with Endocrine Clinic, check fingersticks, diabetic diet      SECONDARY DISCHARGE DIAGNOSES  Diagnosis: Bipolar disorder  Assessment and Plan of Treatment: continue psychiatric medications as ordered    Diagnosis: NSTEMI (non-ST elevated myocardial infarction)  Assessment and Plan of Treatment: continue Aspirin and Plavix, follow up with cardiologist PRINCIPAL DISCHARGE DIAGNOSIS  Diagnosis: Hyperglycemia  Assessment and Plan of Treatment: A1C 17, ypu were chanegd to mixed insulin to allow for compliance novolog 70/30 50units to be given beofre breakfast and 25 units before dinner, make sure you eat 3 meals per day and do not miss meals,  follow up with Endocrine Clinic, check fingersticks, diabetic diet      SECONDARY DISCHARGE DIAGNOSES  Diagnosis: Bipolar disorder  Assessment and Plan of Treatment: continue psychiatric medications as ordered    Diagnosis: NSTEMI (non-ST elevated myocardial infarction)  Assessment and Plan of Treatment: continue Aspirin and Plavix, follow up with cardiologist

## 2019-04-13 NOTE — DISCHARGE NOTE PROVIDER - HOSPITAL COURSE
The patient is a 30 y/o man with PMH significant for NSTEMI (1/2019 on DAPT), MR, DM2 BIBEMS for hyperglycemia. Patient family not able to help patient with medication administration consistantly, A1C 17. Endocrine evaluated and recc lantus 37u at bedtime and humalog 15u premeal. Social work consulted, patient's mother reportedly requesting group home. The patient is a 28 y/o man with PMH significant for NSTEMI (1/2019 on DAPT), MR, DM2 BIBEMS for hyperglycemia. Patient family not able to help patient with medication administration consistantly, A1C 17. Endocrine evaluated and recc lantus 37u at bedtime and humalog 15u premeal. Social work consulted, patient's mother reportedly requesting group home.  unable to place patient in group from hospital. Patient mother aware that they need to go through the office of disability as outpatient to place patient. The patient is a 28 y/o man with PMH significant for NSTEMI (1/2019 on DAPT), MR, DM2 BIBEMS for hyperglycemia. Patient family not able to help patient with medication administration consistantly, A1C 17. Endocrine evaluated and recc lantus 37u at bedtime and humalog 15u premeal. Social work consulted, patient's mother reportedly requesting group home.  unable to place patient in group from hospital. Patient mother aware that they need to go through the office of disability as outpatient to place patient. For better compliance patient insulin switched to mixed insulin novolog 70/30 BID. Patient stable and cleared for d/c by Dr. Burrows.

## 2019-04-13 NOTE — DISCHARGE NOTE PROVIDER - PROVIDER TOKENS
FREE:[LAST:[You may call [facility] Endocrinology at [phone]],PHONE:[(   )    -],FAX:[(   )    -]] PROVIDER:[TOKEN:[[id number]]]

## 2019-04-13 NOTE — PROGRESS NOTE ADULT - PROBLEM SELECTOR PLAN 4
-pt with NSTEMI in 1/19 on DOAC  -EKG with t-wave inversions in II, V3, V5, V6, consistent with ischemia  -cardiac enzymes not elevate, WNL  -TTE is unremarkable, cardiac event ruled out  -cardiac enzymes are not elevated  -c/w ASA, plavix, home HTN and HLD meds  -DVT ppx SCDs

## 2019-04-13 NOTE — PROGRESS NOTE ADULT - PROBLEM SELECTOR PLAN 3
-chronic, stable, patient with intellectual disability  -mother reportedly requesting group home placement  -social work, to assist with medication adherence, home situation

## 2019-04-13 NOTE — PROGRESS NOTE ADULT - PROBLEM SELECTOR PLAN 1
-FSGs >500 at admission, trending down, >>187, in setting of insulin non-adherence, DM2  -small AG resolved  -unlikely to develop DKA  -increase lantus to 37u/humalog 15u premeal per endocrinology  -DM protocol, ISS, ctm  -social work for medication adherance

## 2019-04-13 NOTE — DISCHARGE NOTE PROVIDER - CARE PROVIDER_API CALL
You may call [facility] Endocrinology at [phone],   Phone: (   )    -  Fax: (   )    -  Follow Up Time: Ambreen Connell (DO)  EndocrinologyMetabDiabetes; Internal Medicine  [address] [facility] [address]  [place] [address]  Phone: [phone]  Fax: [phone]  Follow Up Time:

## 2019-04-13 NOTE — PROGRESS NOTE ADULT - PROBLEM SELECTOR PLAN 1
Increase lantus to 45 units at bedtime and continue Humalog 18U TIDAC  Continue moderate correctional humalog scale before meals and added bedtime moderate scale   Goal glucose 100-180  Discharge on basal/bolus  Social work consult for family assistance  Mother requesting group home placement  Need to clarify insurance. Will recommend f/u at ENDO Office vs [facility] Clinic.

## 2019-04-14 DIAGNOSIS — I21.4 NON-ST ELEVATION (NSTEMI) MYOCARDIAL INFARCTION: ICD-10-CM

## 2019-04-14 DIAGNOSIS — Z29.9 ENCOUNTER FOR PROPHYLACTIC MEASURES, UNSPECIFIED: ICD-10-CM

## 2019-04-14 DIAGNOSIS — F31.9 BIPOLAR DISORDER, UNSPECIFIED: ICD-10-CM

## 2019-04-14 LAB
ANION GAP SERPL CALC-SCNC: 12 MMO/L — SIGNIFICANT CHANGE UP (ref 7–14)
BUN SERPL-MCNC: 6 MG/DL — LOW (ref 7–23)
CALCIUM SERPL-MCNC: 9 MG/DL — SIGNIFICANT CHANGE UP (ref 8.4–10.5)
CHLORIDE SERPL-SCNC: 106 MMOL/L — SIGNIFICANT CHANGE UP (ref 98–107)
CO2 SERPL-SCNC: 23 MMOL/L — SIGNIFICANT CHANGE UP (ref 22–31)
CREAT SERPL-MCNC: 0.67 MG/DL — SIGNIFICANT CHANGE UP (ref 0.5–1.3)
GLUCOSE SERPL-MCNC: 112 MG/DL — HIGH (ref 70–99)
MAGNESIUM SERPL-MCNC: 1.7 MG/DL — SIGNIFICANT CHANGE UP (ref 1.6–2.6)
PHOSPHATE SERPL-MCNC: 5.2 MG/DL — HIGH (ref 2.5–4.5)
POTASSIUM SERPL-MCNC: 3.2 MMOL/L — LOW (ref 3.5–5.3)
POTASSIUM SERPL-SCNC: 3.2 MMOL/L — LOW (ref 3.5–5.3)
SODIUM SERPL-SCNC: 141 MMOL/L — SIGNIFICANT CHANGE UP (ref 135–145)

## 2019-04-14 PROCEDURE — 99233 SBSQ HOSP IP/OBS HIGH 50: CPT

## 2019-04-14 RX ORDER — POTASSIUM CHLORIDE 20 MEQ
40 PACKET (EA) ORAL EVERY 4 HOURS
Qty: 0 | Refills: 0 | Status: COMPLETED | OUTPATIENT
Start: 2019-04-14 | End: 2019-04-14

## 2019-04-14 RX ADMIN — Medication 4: at 13:24

## 2019-04-14 RX ADMIN — DIVALPROEX SODIUM 750 MILLIGRAM(S): 500 TABLET, DELAYED RELEASE ORAL at 06:04

## 2019-04-14 RX ADMIN — CLOPIDOGREL BISULFATE 75 MILLIGRAM(S): 75 TABLET, FILM COATED ORAL at 12:13

## 2019-04-14 RX ADMIN — INSULIN GLARGINE 45 UNIT(S): 100 INJECTION, SOLUTION SUBCUTANEOUS at 22:13

## 2019-04-14 RX ADMIN — ATORVASTATIN CALCIUM 80 MILLIGRAM(S): 80 TABLET, FILM COATED ORAL at 22:13

## 2019-04-14 RX ADMIN — DIVALPROEX SODIUM 750 MILLIGRAM(S): 500 TABLET, DELAYED RELEASE ORAL at 18:22

## 2019-04-14 RX ADMIN — Medication 40 MILLIEQUIVALENT(S): at 12:11

## 2019-04-14 RX ADMIN — Medication 18 UNIT(S): at 18:19

## 2019-04-14 RX ADMIN — Medication 1 MILLIGRAM(S): at 18:21

## 2019-04-14 RX ADMIN — Medication 18 UNIT(S): at 08:57

## 2019-04-14 RX ADMIN — HALOPERIDOL DECANOATE 20 MILLIGRAM(S): 100 INJECTION INTRAMUSCULAR at 22:13

## 2019-04-14 RX ADMIN — Medication 18 UNIT(S): at 13:23

## 2019-04-14 RX ADMIN — Medication 1 MILLIGRAM(S): at 06:04

## 2019-04-14 RX ADMIN — Medication 81 MILLIGRAM(S): at 12:13

## 2019-04-14 RX ADMIN — Medication 40 MILLIEQUIVALENT(S): at 13:25

## 2019-04-14 NOTE — PROGRESS NOTE ADULT - PROBLEM SELECTOR PLAN 1
-FSGs >500 at admission, trending down, >>187, in setting of insulin non-adherence, DM2  -small AG resolved  -unlikely to develop DKA  -increase lantus to 45u/humalog 18u premeal per endocrinology  - FSG qAC, qHS, ISS  -social work for medication adherence/ placement

## 2019-04-14 NOTE — PROGRESS NOTE ADULT - PROBLEM SELECTOR PLAN 3
-pt with hx of NSTEMI in Dec 2018  -EKG with t-wave inversions in II, III, AVF, V2-V6, CE's 12->12, TTE w/o wall motion abnormalities.  -cardiac enzymes not elevate, WNL  -c/w ASA, Plavix, statin

## 2019-04-15 ENCOUNTER — TRANSCRIPTION ENCOUNTER (OUTPATIENT)
Age: 29
End: 2019-04-15

## 2019-04-15 VITALS
DIASTOLIC BLOOD PRESSURE: 78 MMHG | TEMPERATURE: 97 F | OXYGEN SATURATION: 100 % | RESPIRATION RATE: 18 BRPM | SYSTOLIC BLOOD PRESSURE: 108 MMHG | HEART RATE: 100 BPM

## 2019-04-15 LAB
ANION GAP SERPL CALC-SCNC: 13 MMO/L — SIGNIFICANT CHANGE UP (ref 7–14)
BUN SERPL-MCNC: 5 MG/DL — LOW (ref 7–23)
CALCIUM SERPL-MCNC: 9 MG/DL — SIGNIFICANT CHANGE UP (ref 8.4–10.5)
CHLORIDE SERPL-SCNC: 105 MMOL/L — SIGNIFICANT CHANGE UP (ref 98–107)
CO2 SERPL-SCNC: 21 MMOL/L — LOW (ref 22–31)
CREAT SERPL-MCNC: 0.67 MG/DL — SIGNIFICANT CHANGE UP (ref 0.5–1.3)
GLUCOSE SERPL-MCNC: 99 MG/DL — SIGNIFICANT CHANGE UP (ref 70–99)
HCT VFR BLD CALC: 44.2 % — SIGNIFICANT CHANGE UP (ref 39–50)
HGB BLD-MCNC: 14.3 G/DL — SIGNIFICANT CHANGE UP (ref 13–17)
MAGNESIUM SERPL-MCNC: 1.6 MG/DL — SIGNIFICANT CHANGE UP (ref 1.6–2.6)
MCHC RBC-ENTMCNC: 28 PG — SIGNIFICANT CHANGE UP (ref 27–34)
MCHC RBC-ENTMCNC: 32.4 % — SIGNIFICANT CHANGE UP (ref 32–36)
MCV RBC AUTO: 86.7 FL — SIGNIFICANT CHANGE UP (ref 80–100)
NRBC # FLD: 0 K/UL — SIGNIFICANT CHANGE UP (ref 0–0)
PHOSPHATE SERPL-MCNC: 4.8 MG/DL — HIGH (ref 2.5–4.5)
PLATELET # BLD AUTO: 137 K/UL — LOW (ref 150–400)
PMV BLD: 11.8 FL — SIGNIFICANT CHANGE UP (ref 7–13)
POTASSIUM SERPL-MCNC: 3.4 MMOL/L — LOW (ref 3.5–5.3)
POTASSIUM SERPL-SCNC: 3.4 MMOL/L — LOW (ref 3.5–5.3)
RBC # BLD: 5.1 M/UL — SIGNIFICANT CHANGE UP (ref 4.2–5.8)
RBC # FLD: 13.8 % — SIGNIFICANT CHANGE UP (ref 10.3–14.5)
SODIUM SERPL-SCNC: 139 MMOL/L — SIGNIFICANT CHANGE UP (ref 135–145)
WBC # BLD: 6.23 K/UL — SIGNIFICANT CHANGE UP (ref 3.8–10.5)
WBC # FLD AUTO: 6.23 K/UL — SIGNIFICANT CHANGE UP (ref 3.8–10.5)

## 2019-04-15 PROCEDURE — 99239 HOSP IP/OBS DSCHRG MGMT >30: CPT

## 2019-04-15 RX ORDER — INSULIN ASPART 100 [IU]/ML
50 INJECTION, SUSPENSION SUBCUTANEOUS
Qty: 5 | Refills: 0 | OUTPATIENT
Start: 2019-04-15

## 2019-04-15 RX ORDER — PROPRANOLOL HCL 160 MG
1 CAPSULE, EXTENDED RELEASE 24HR ORAL
Qty: 60 | Refills: 0 | OUTPATIENT
Start: 2019-04-15 | End: 2019-05-14

## 2019-04-15 RX ORDER — LISINOPRIL 2.5 MG/1
1 TABLET ORAL
Qty: 30 | Refills: 0 | OUTPATIENT
Start: 2019-04-15 | End: 2019-05-14

## 2019-04-15 RX ORDER — HALOPERIDOL DECANOATE 100 MG/ML
1 INJECTION INTRAMUSCULAR
Qty: 30 | Refills: 0 | OUTPATIENT
Start: 2019-04-15 | End: 2019-05-14

## 2019-04-15 RX ORDER — POTASSIUM CHLORIDE 20 MEQ
40 PACKET (EA) ORAL EVERY 4 HOURS
Qty: 0 | Refills: 0 | Status: COMPLETED | OUTPATIENT
Start: 2019-04-15 | End: 2019-04-15

## 2019-04-15 RX ORDER — BENZTROPINE MESYLATE 1 MG
1 TABLET ORAL
Qty: 60 | Refills: 0 | OUTPATIENT
Start: 2019-04-15 | End: 2019-05-14

## 2019-04-15 RX ORDER — DIVALPROEX SODIUM 500 MG/1
3 TABLET, DELAYED RELEASE ORAL
Qty: 180 | Refills: 0 | OUTPATIENT
Start: 2019-04-15 | End: 2019-05-14

## 2019-04-15 RX ORDER — PROPRANOLOL HCL 160 MG
1 CAPSULE, EXTENDED RELEASE 24HR ORAL
Qty: 0 | Refills: 0 | COMMUNITY

## 2019-04-15 RX ADMIN — Medication 40 MILLIEQUIVALENT(S): at 13:18

## 2019-04-15 RX ADMIN — Medication 40 MILLIEQUIVALENT(S): at 09:16

## 2019-04-15 RX ADMIN — Medication 1 MILLIGRAM(S): at 05:53

## 2019-04-15 RX ADMIN — CLOPIDOGREL BISULFATE 75 MILLIGRAM(S): 75 TABLET, FILM COATED ORAL at 12:17

## 2019-04-15 RX ADMIN — DIVALPROEX SODIUM 750 MILLIGRAM(S): 500 TABLET, DELAYED RELEASE ORAL at 05:53

## 2019-04-15 RX ADMIN — Medication 18 UNIT(S): at 13:18

## 2019-04-15 RX ADMIN — Medication 81 MILLIGRAM(S): at 12:17

## 2019-04-15 RX ADMIN — Medication 18 UNIT(S): at 09:13

## 2019-04-15 NOTE — PROGRESS NOTE ADULT - ASSESSMENT
The patient is a 30 y/o man with PMH significant for NSTEMI (1/2019 on DAPT), MR, DM2 BIBEMS for hyperglycemia in setting of non-compliance.
29 year old man with MR and ketosis prone T2DM c/b CAD, uncontrolled A1C 17, admitted with hyperglycemia in 700s noted to be ketotic not acidotic. Patient is in a poor social situation. Has father with brain cancer and mother (who has poor health literacy) is not able to care for him appropriately.
The patient is a 28 y/o man with PMH significant for NSTEMI (1/2019 on DAPT), MR, DM2 BIBEMS for hyperglycemia.
The patient is a 30 y/o man with PMH significant for NSTEMI (1/2019 on DAPT), MR, DM2 BIBEMS for hyperglycemia in setting of non-compliance.
The patient is a 30 y/o man with PMH significant for NSTEMI (1/2019 on DAPT), MR, DM2 BIBEMS for hyperglycemia. r/o cardiac event.

## 2019-04-15 NOTE — PROGRESS NOTE ADULT - PROBLEM SELECTOR PLAN 2
-chronic, stable, patient with intellectual disability  -mother reportedly requesting group home placement  -social work, to assist with medication adherence, home situation
-chronic, stable, patient with intellectual disability  Per SW, group home placement to be attempted as outpatient. Patient's sister to help with medication administration
See above under hyperglycemia
See above under hyperglycemia

## 2019-04-15 NOTE — PROGRESS NOTE ADULT - PROBLEM SELECTOR PROBLEM 1
Type 2 diabetes mellitus with complication, with long-term current use of insulin
Hyperglycemia
Hyperglycemia
Type 2 diabetes mellitus with complication, with long-term current use of insulin
Type 2 diabetes mellitus with complication, with long-term current use of insulin

## 2019-04-15 NOTE — PROGRESS NOTE ADULT - PROBLEM SELECTOR PROBLEM 3
NSTEMI (non-ST elevated myocardial infarction)
Mental retardation
Mental retardation
NSTEMI (non-ST elevated myocardial infarction)

## 2019-04-15 NOTE — DISCHARGE NOTE NURSING/CASE MANAGEMENT/SOCIAL WORK - NSDCDPATPORTLINK_GEN_ALL_CORE
You can access the [facility] Patient Portal, offered by [facility], by registering with the following website: [email or web address]

## 2019-04-15 NOTE — PROGRESS NOTE ADULT - PROBLEM SELECTOR PLAN 1
-FSGs >500 at admission in setting of insulin non-adherence, DM2  now FS improved  C/w Lantus 45 units at bedtime and continue Humalog 18U TID AC -FSGs >500 at admission in setting of insulin non-adherence, DM2  now FS improved  C/w Lantus 45 units at bedtime and continue Humalog 18U TID AC  Can resume home propanolol and resume lisinopril at 2.5mg (home dose 5mg) given low-nml BP -FSGs >500 at admission in setting of insulin non-adherence, DM2  now FS improved  C/w Lantus 45 units at bedtime and continue Humalog 18U TID AC  f/u Endo recs for insulin for discharge  Can resume home propanolol and resume lisinopril at 2.5mg (home dose 5mg) given low-nml BP

## 2019-04-15 NOTE — PROGRESS NOTE ADULT - PROBLEM SELECTOR PROBLEM 2
Mental retardation
Mental retardation
Type 2 diabetes mellitus with complication, with long-term current use of insulin
Type 2 diabetes mellitus with complication, with long-term current use of insulin

## 2019-04-15 NOTE — DISCHARGE NOTE NURSING/CASE MANAGEMENT/SOCIAL WORK - NSDCFUADDAPPT_GEN_ALL_CORE_FT
follow up with Diabetic Educator 5/16/19 at [address] [facility].   follow up with Dr. Connell Endocrinologist 6/26/19 at 2pm at [address] [facility].

## 2019-04-15 NOTE — PROGRESS NOTE ADULT - SUBJECTIVE AND OBJECTIVE BOX
Hebert Reagan MD PGY3    Pager [phone]/ [phone]    For Night coverage 7pm-7am: NS- page [phone] Team1-3, page [phone] Team4 & Care Model  Sat/Riz[place] Cross Coverage 12pm-7pm: NS- page [phone] for Team1-4, [facility]- pager forwarded to covering Resident    Patient is a 29y old  Male who presents with a chief complaint of hyperglycemia (13 Apr 2019 12:58)      INTERVAL HPI/OVERNIGHT EVENTS: No o/n events, patient seen and examined at the bedside. Currently denies any complaints; no CP, SOB, Abdominal pain, fever/chills, N/V.    MEDICATIONS  (STANDING):  aspirin  chewable 81 milliGRAM(s) Oral daily  atorvastatin 80 milliGRAM(s) Oral at bedtime  benztropine 1 milliGRAM(s) Oral two times a day  clopidogrel Tablet 75 milliGRAM(s) Oral daily  dextrose 5%. 1000 milliLiter(s) (50 mL/Hr) IV Continuous <Continuous>  dextrose 50% Injectable 12.5 Gram(s) IV Push once  dextrose 50% Injectable 25 Gram(s) IV Push once  dextrose 50% Injectable 25 Gram(s) IV Push once  diphtheria/tetanus Vaccine - Adult 0.5 milliLiter(s) IntraMuscular once  diVALproex  milliGRAM(s) Oral two times a day  haloperidol     Tablet 20 milliGRAM(s) Oral at bedtime  insulin glargine Injectable (LANTUS) 45 Unit(s) SubCutaneous at bedtime  insulin lispro (HumaLOG) corrective regimen sliding scale   SubCutaneous three times a day before meals  insulin lispro (HumaLOG) corrective regimen sliding scale   SubCutaneous at bedtime  insulin lispro Injectable (HumaLOG) 18 Unit(s) SubCutaneous three times a day before meals    MEDICATIONS  (PRN):  acetaminophen   Tablet .. 650 milliGRAM(s) Oral once PRN Mild Pain (1 - 3), Moderate Pain (4 - 6)  dextrose 40% Gel 15 Gram(s) Oral once PRN Blood Glucose LESS THAN 70 milliGRAM(s)/deciliter  glucagon  Injectable 1 milliGRAM(s) IntraMuscular once PRN Glucose LESS THAN 70 milligrams/deciliter      Allergies    No Known Allergies    Intolerances        REVIEW OF SYSTEMS:  CONSTITUTIONAL: No fever, chills  EYES: No eye pain, visual disturbances  ENMT:  No difficulty hearing  RESPIRATORY: No cough, wheezing, chills or hemoptysis; No shortness of breath  CARDIOVASCULAR: No chest pain, palpitations, dizziness, or leg swelling  GASTROINTESTINAL: No abdominal or epigastric pain. No nausea, vomiting,   NEUROLOGICAL: No headaches  SKIN: No itching, burning, rashes, or lesions  MUSCULOSKELETAL: No joint pain or swelling; No muscle, back, or extremity pain  PSYCHIATRIC: No depression      Vital Signs Last 24 Hrs  T(C): 36.4 (14 Apr 2019 06:03), Max: 36.9 (13 Apr 2019 13:08)  T(F): 97.6 (14 Apr 2019 06:03), Max: 98.5 (13 Apr 2019 13:08)  HR: 74 (14 Apr 2019 06:03) (70 - 88)  BP: 122/73 (14 Apr 2019 06:03) (104/68 - 122/73)  BP(mean): --  RR: 16 (14 Apr 2019 06:03) (15 - 16)  SpO2: 96% (14 Apr 2019 06:03) (96% - 100%)  I&O's Summary    13 Apr 2019 07:01  -  14 Apr 2019 07:00  --------------------------------------------------------  IN: 100 mL / OUT: 0 mL / NET: 100 mL        PHYSICAL EXAM:  GENERAL: NAD, well-groomed, well-developed  HEAD:  Atraumatic, Normocephalic  EYES: EOMI, PERRLA, conjunctiva and sclera clear  NERVOUS SYSTEM:  Answers yes/no to questions, does not answer orientation questions appropriately. Motor Strength 5/5 B/L upper and lower extremities  CHEST/LUNG: Clear to auscultation bilaterally; No rales, rhonchi, wheezing, or rubs  HEART: Regular rate and rhythm; No murmurs, rubs, or gallops  ABDOMEN: Soft, Nontender, Nondistended; Bowel sounds present  EXTREMITIES:  No edema  SKIN: No rashes or lesions    LABS:    04-13    137  |  104  |  7   ----------------------------<  275<H>  3.5   |  23  |  0.72    Ca    8.4      13 Apr 2019 07:50  Phos  3.6     04-13  Mg     1.8     04-13          CAPILLARY BLOOD GLUCOSE      POCT Blood Glucose.: 202 mg/dL (13 Apr 2019 22:08)  POCT Blood Glucose.: 133 mg/dL (13 Apr 2019 17:46)  POCT Blood Glucose.: 262 mg/dL (13 Apr 2019 12:40)  POCT Blood Glucose.: 297 mg/dL (13 Apr 2019 08:58)      Microbiology        RADIOLOGY & ADDITIONAL TESTS:    Imaging Personally Reviewed:  [ ] YES  [ ] NO    Consultant(s) Notes Reviewed:  [ ] YES  [ ] NO    Care Discussed with Consultants/Other Providers [ ] YES  [ ] NO
***************************************************************  Saida Annjn, PGY1  Internal Medicine   pager [phone]  ***************************************************************    OJ DOMINGUEZ  29y  MRN: [id number]    Patient is a 29y old  Male who presents with a chief complaint of hyperglycemia (11 Apr 2019 19:02)      Subjective: no events ON, sinus rhythm. Denies fever, CP, SOB, abn pain, N/V, dysuria. Tolerating diet. Patient states he is "lonely" and misses his girlfriend.    MEDICATIONS  (STANDING):  aspirin  chewable 81 milliGRAM(s) Oral daily  atorvastatin 80 milliGRAM(s) Oral at bedtime  benztropine 1 milliGRAM(s) Oral two times a day  clopidogrel Tablet 75 milliGRAM(s) Oral daily  dextrose 5%. 1000 milliLiter(s) (50 mL/Hr) IV Continuous <Continuous>  dextrose 50% Injectable 12.5 Gram(s) IV Push once  dextrose 50% Injectable 25 Gram(s) IV Push once  dextrose 50% Injectable 25 Gram(s) IV Push once  diVALproex  milliGRAM(s) Oral two times a day  haloperidol     Tablet 20 milliGRAM(s) Oral at bedtime  insulin lispro (HumaLOG) corrective regimen sliding scale   SubCutaneous three times a day before meals  insulin lispro Injectable (HumaLOG) 15 Unit(s) SubCutaneous three times a day before meals  lactated ringers. 1000 milliLiter(s) (100 mL/Hr) IV Continuous <Continuous>    MEDICATIONS  (PRN):  dextrose 40% Gel 15 Gram(s) Oral once PRN Blood Glucose LESS THAN 70 milliGRAM(s)/deciliter  glucagon  Injectable 1 milliGRAM(s) IntraMuscular once PRN Glucose LESS THAN 70 milligrams/deciliter      Objective:    Vitals: Vital Signs Last 24 Hrs  T(C): 36.7 (04-12-19 @ 05:41), Max: 36.8 (04-11-19 @ 14:17)  T(F): 98 (04-12-19 @ 05:41), Max: 98.2 (04-11-19 @ 14:17)  HR: 76 (04-12-19 @ 05:41) (73 - 86)  BP: 114/76 (04-12-19 @ 05:41) (109/72 - 145/91)  BP(mean): --  RR: 16 (04-12-19 @ 05:41) (16 - 17)  SpO2: 99% (04-12-19 @ 05:41) (96% - 100%)            I&O's Summary      Physical Exam:    GENERAL: NAD  HEENT: PERRL, no oropharyngeal lesions or erythema appreciated  Pulm: normal work of breathing, CTABL  CV: RRR, S1&S2+, no m/r/g appreciated  ABDOMEN: soft, nt, nd, no hepatosplenomegaly  EXTREMITIES:  no appreciable edema in b/l LE  Neuro: answers questions yes or no, sometimes speaks complete sentences SKIN: warm and dry, no visible rash	    LABS:  04-11    138  |  102  |  6<L>  ----------------------------<  189<H>  3.3<L>   |  24  |  0.62  04-11    132<L>  |  96<L>  |  6<L>  ----------------------------<  448<H>  4.3   |  21<L>  |  0.69  04-11    123<L>  |  87<L>  |  6<L>  ----------------------------<  732<HH>  4.4   |  23  |  0.69    Ca    8.9      11 Apr 2019 22:35  Ca    9.4      11 Apr 2019 16:58  Ca    9.9      11 Apr 2019 15:03  Phos  2.3     04-11  Mg     1.7     04-11    TPro  7.4  /  Alb  4.2  /  TBili  0.2  /  DBili  x   /  AST  24  /  ALT  14  /  AlkPhos  72  04-11                                              15.3   7.24  )-----------( 177      ( 11 Apr 2019 15:03 )             45.9     CAPILLARY BLOOD GLUCOSE      POCT Blood Glucose.: 187 mg/dL (11 Apr 2019 21:41)  POCT Blood Glucose.: 181 mg/dL (11 Apr 2019 20:49)  POCT Blood Glucose.: 381 mg/dL (11 Apr 2019 18:03)  POCT Blood Glucose.: 406 mg/dL (11 Apr 2019 17:00)  POCT Blood Glucose.: 550 mg/dL (11 Apr 2019 15:42)  POCT Blood Glucose.: >600 mg/dL (11 Apr 2019 14:25)      RADIOLOGY & ADDITIONAL TESTS:    Imaging Personally Reviewed:  [x ] YES  [ ] NO    Consultants involved in case:   Consultant(s) Notes Reviewed:  [ x] YES  [ ] NO:   Care Discussed with Consultants/Other Providers [x ] YES  [ ] NO
***************************************************************  Saida Annjn, PGY1  Internal Medicine   pager [phone]  ***************************************************************    OJ DOMINGUEZ  29y  MRN: [id number]    Patient is a 29y old  Male who presents with a chief complaint of hyperglycemia (12 Apr 2019 12:54)      Subjective: no events ON. Denies fever, CP, SOB, abn pain, N/V, dysuria. Tolerating diet.     MEDICATIONS  (STANDING):  aspirin  chewable 81 milliGRAM(s) Oral daily  atorvastatin 80 milliGRAM(s) Oral at bedtime  benztropine 1 milliGRAM(s) Oral two times a day  clopidogrel Tablet 75 milliGRAM(s) Oral daily  dextrose 5%. 1000 milliLiter(s) (50 mL/Hr) IV Continuous <Continuous>  dextrose 50% Injectable 12.5 Gram(s) IV Push once  dextrose 50% Injectable 25 Gram(s) IV Push once  dextrose 50% Injectable 25 Gram(s) IV Push once  diphtheria/tetanus Vaccine - Adult 0.5 milliLiter(s) IntraMuscular once  diVALproex  milliGRAM(s) Oral two times a day  haloperidol     Tablet 20 milliGRAM(s) Oral at bedtime  insulin glargine Injectable (LANTUS) 37 Unit(s) SubCutaneous at bedtime  insulin lispro (HumaLOG) corrective regimen sliding scale   SubCutaneous three times a day before meals  insulin lispro Injectable (HumaLOG) 15 Unit(s) SubCutaneous three times a day before meals  potassium chloride   Solution 20 milliEquivalent(s) Oral two times a day    MEDICATIONS  (PRN):  acetaminophen   Tablet .. 650 milliGRAM(s) Oral once PRN Mild Pain (1 - 3), Moderate Pain (4 - 6)  dextrose 40% Gel 15 Gram(s) Oral once PRN Blood Glucose LESS THAN 70 milliGRAM(s)/deciliter  glucagon  Injectable 1 milliGRAM(s) IntraMuscular once PRN Glucose LESS THAN 70 milligrams/deciliter      Objective:    Vitals: Vital Signs Last 24 Hrs  T(C): 36.7 (04-13-19 @ 06:58), Max: 36.7 (04-13-19 @ 06:58)  T(F): 98 (04-13-19 @ 06:58), Max: 98 (04-13-19 @ 06:58)  HR: 62 (04-13-19 @ 06:58) (62 - 102)  BP: 104/62 (04-13-19 @ 06:58) (104/62 - 121/80)  BP(mean): --  RR: 17 (04-13-19 @ 06:58) (16 - 17)  SpO2: 100% (04-13-19 @ 06:58) (98% - 100%)            I&O's Summary    12 Apr 2019 07:01  -  13 Apr 2019 07:00  --------------------------------------------------------  IN: 300 mL / OUT: 0 mL / NET: 300 mL        Physical Exam:    GENERAL: NAD  HEENT: PERRL, no oropharyngeal lesions or erythema appreciated  Pulm: normal work of breathing, CTABL  CV: RRR, S1&S2+, no m/r/g appreciated  ABDOMEN: soft, nt, nd, no hepatosplenomegaly  EXTREMITIES:  no appreciable edema in b/l LE  Neuro: answers questions yes or no, sometimes speaks complete sentences SKIN: warm and dry, no visible rash	    LABS:  04-12    136  |  100  |  7   ----------------------------<  261<H>  3.3<L>   |  24  |  0.66  04-11    138  |  102  |  6<L>  ----------------------------<  189<H>  3.3<L>   |  24  |  0.62  04-11    132<L>  |  96<L>  |  6<L>  ----------------------------<  448<H>  4.3   |  21<L>  |  0.69    Ca    8.8      12 Apr 2019 07:55  Ca    8.9      11 Apr 2019 22:35  Ca    9.4      11 Apr 2019 16:58  Phos  3.3     04-12  Mg     1.6     04-12    TPro  7.4  /  Alb  4.2  /  TBili  0.2  /  DBili  x   /  AST  24  /  ALT  14  /  AlkPhos  72  04-11                                              15.3   7.24  )-----------( 177      ( 11 Apr 2019 15:03 )             45.9     CAPILLARY BLOOD GLUCOSE      POCT Blood Glucose.: 217 mg/dL (12 Apr 2019 22:04)  POCT Blood Glucose.: 234 mg/dL (12 Apr 2019 17:44)  POCT Blood Glucose.: 235 mg/dL (12 Apr 2019 12:35)  POCT Blood Glucose.: 250 mg/dL (12 Apr 2019 09:18)      RADIOLOGY & ADDITIONAL TESTS:    Imaging Personally Reviewed:  [x ] YES  [ ] NO    Consultants involved in case:   Consultant(s) Notes Reviewed:  [ x] YES  [ ] NO:   Care Discussed with Consultants/Other Providers [x ] YES  [ ] NO
Chief Complaint: DM 2 uncontrolled with hyperglycemia    History: Glucose trending over 250 with hyperglycemia     MEDICATIONS  (STANDING):  aspirin  chewable 81 milliGRAM(s) Oral daily  atorvastatin 80 milliGRAM(s) Oral at bedtime  benztropine 1 milliGRAM(s) Oral two times a day  clopidogrel Tablet 75 milliGRAM(s) Oral daily  dextrose 5%. 1000 milliLiter(s) (50 mL/Hr) IV Continuous <Continuous>  dextrose 50% Injectable 12.5 Gram(s) IV Push once  dextrose 50% Injectable 25 Gram(s) IV Push once  dextrose 50% Injectable 25 Gram(s) IV Push once  diphtheria/tetanus Vaccine - Adult 0.5 milliLiter(s) IntraMuscular once  diVALproex  milliGRAM(s) Oral two times a day  haloperidol     Tablet 20 milliGRAM(s) Oral at bedtime  insulin glargine Injectable (LANTUS) 37 Unit(s) SubCutaneous at bedtime  insulin lispro (HumaLOG) corrective regimen sliding scale   SubCutaneous three times a day before meals  insulin lispro Injectable (HumaLOG) 15 Unit(s) SubCutaneous three times a day before meals    MEDICATIONS  (PRN):  acetaminophen   Tablet .. 650 milliGRAM(s) Oral once PRN Mild Pain (1 - 3), Moderate Pain (4 - 6)  dextrose 40% Gel 15 Gram(s) Oral once PRN Blood Glucose LESS THAN 70 milliGRAM(s)/deciliter  glucagon  Injectable 1 milliGRAM(s) IntraMuscular once PRN Glucose LESS THAN 70 milligrams/deciliter          No Known Allergies        Review of Systems:  Constitutional: No fever  Cardiovascular: No chest pain, palpitations  Respiratory: No SOB, no cough  GI: No nausea, vomiting, abdominal pain      PHYSICAL EXAM:  VITALS: T(C): 36.7 (04-13-19 @ 06:58)  T(F): 98 (04-13-19 @ 06:58), Max: 98 (04-13-19 @ 06:58)  HR: 62 (04-13-19 @ 06:58) (62 - 102)  BP: 104/62 (04-13-19 @ 06:58) (104/62 - 121/80)  RR:  (16 - 17)  SpO2:  (98% - 100%)  Wt(kg): --  GENERAL: NAD  EYES: No proptosis, no lid lag, anicteric  HEENT:  Atraumatic, Normocephalic, moist mucous membranes  RESPIRATORY: Non labored      CAPILLARY BLOOD GLUCOSE      POCT Blood Glucose.: 262 mg/dL (13 Apr 2019 12:40)  POCT Blood Glucose.: 297 mg/dL (13 Apr 2019 08:58)  POCT Blood Glucose.: 217 mg/dL (12 Apr 2019 22:04)  POCT Blood Glucose.: 234 mg/dL (12 Apr 2019 17:44)      04-13    137  |  104  |  7   ----------------------------<  275<H>  3.5   |  23  |  0.72    EGFR if : 147  EGFR if non : 127    Ca    8.4      04-13  Mg     1.8     04-13  Phos  3.6     04-13    TPro  7.4  /  Alb  4.2  /  TBili  0.2  /  DBili  x   /  AST  24  /  ALT  14  /  AlkPhos  72  04-11            Hemoglobin A1C, Whole Blood: 17.1 % <H> [4.0 - 5.6] (04-12-19 @ 07:55)  Hemoglobin A1C, Whole Blood: Test not performed CALC? ERROR BY ANALYZER NOTIFIED ,Y % [4.0 - 5.6] (04-11-19 @ 15:03)
Patient is a 29y old  Male who presents with a chief complaint of hyperglycemia (14 Apr 2019 08:22)      SUBJECTIVE / OVERNIGHT EVENTS: No acute events overnight. Denies chest pain, SOB. Wants to go home. Per patient, his sister administers his insulin at home. ROS limited by cognitive disability    MEDICATIONS  (STANDING):  aspirin  chewable 81 milliGRAM(s) Oral daily  atorvastatin 80 milliGRAM(s) Oral at bedtime  benztropine 1 milliGRAM(s) Oral two times a day  clopidogrel Tablet 75 milliGRAM(s) Oral daily  dextrose 5%. 1000 milliLiter(s) (50 mL/Hr) IV Continuous <Continuous>  dextrose 50% Injectable 12.5 Gram(s) IV Push once  dextrose 50% Injectable 25 Gram(s) IV Push once  dextrose 50% Injectable 25 Gram(s) IV Push once  diVALproex  milliGRAM(s) Oral two times a day  haloperidol     Tablet 20 milliGRAM(s) Oral at bedtime  insulin glargine Injectable (LANTUS) 45 Unit(s) SubCutaneous at bedtime  insulin lispro (HumaLOG) corrective regimen sliding scale   SubCutaneous three times a day before meals  insulin lispro (HumaLOG) corrective regimen sliding scale   SubCutaneous at bedtime  insulin lispro Injectable (HumaLOG) 18 Unit(s) SubCutaneous three times a day before meals    MEDICATIONS  (PRN):  acetaminophen   Tablet .. 650 milliGRAM(s) Oral once PRN Mild Pain (1 - 3), Moderate Pain (4 - 6)  dextrose 40% Gel 15 Gram(s) Oral once PRN Blood Glucose LESS THAN 70 milliGRAM(s)/deciliter  glucagon  Injectable 1 milliGRAM(s) IntraMuscular once PRN Glucose LESS THAN 70 milligrams/deciliter      T(C): 36.3 (04-15-19 @ 12:25), Max: 36.7 (04-14-19 @ 21:37)  HR: 100 (04-15-19 @ 12:25) (92 - 100)  BP: 108/78 (04-15-19 @ 12:25) (98/62 - 116/81)  RR: 18 (04-15-19 @ 12:25) (15 - 20)  SpO2: 100% (04-15-19 @ 12:25) (100% - 100%)  CAPILLARY BLOOD GLUCOSE      POCT Blood Glucose.: 103 mg/dL (15 Apr 2019 12:56)  POCT Blood Glucose.: 91 mg/dL (15 Apr 2019 08:38)  POCT Blood Glucose.: 168 mg/dL (14 Apr 2019 21:39)  POCT Blood Glucose.: 147 mg/dL (14 Apr 2019 17:39)    I&O's Summary    14 Apr 2019 07:01  -  15 Apr 2019 07:00  --------------------------------------------------------  IN: 200 mL / OUT: 150 mL / NET: 50 mL        PHYSICAL EXAM:  GENERAL: no apparent distress, on room air  EYES: sclera clear b/l  CHEST/LUNG: Clear to auscultation bilaterally; No wheezing or crackles  HEART: s1/s2, no murmurs, no leg edema b/l  ABDOMEN: Soft, Nontender, Nondistended; Bowel sounds present  EXTREMITIES:  WWP  NEUROLOGY: awake, alert  PSYCH: calm    LABS:                        14.3   6.23  )-----------( 137      ( 15 Apr 2019 07:00 )             44.2     04-15    139  |  105  |  5<L>  ----------------------------<  99  3.4<L>   |  21<L>  |  0.67    Ca    9.0      15 Apr 2019 07:00  Phos  4.8     04-15  Mg     1.6     04-15                RADIOLOGY & ADDITIONAL TESTS:

## 2019-04-18 ENCOUNTER — EMERGENCY (EMERGENCY)
Facility: HOSPITAL | Age: 29
LOS: 1 days | Discharge: HOME | End: 2019-04-18
Attending: EMERGENCY MEDICINE | Admitting: EMERGENCY MEDICINE
Payer: MEDICARE

## 2019-04-18 VITALS
DIASTOLIC BLOOD PRESSURE: 86 MMHG | TEMPERATURE: 98 F | RESPIRATION RATE: 16 BRPM | HEART RATE: 81 BPM | SYSTOLIC BLOOD PRESSURE: 126 MMHG | OXYGEN SATURATION: 100 %

## 2019-04-18 PROCEDURE — 99284 EMERGENCY DEPT VISIT MOD MDM: CPT | Mod: 25
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
## 2019-04-19 PROCEDURE — 70486 CT MAXILLOFACIAL W/O DYE: CPT | Mod: 26

## 2019-04-19 PROCEDURE — 72125 CT NECK SPINE W/O DYE: CPT | Mod: 26

## 2019-04-19 NOTE — ED ADULT NURSE NOTE - NSIMPLEMENTINTERV_GEN_ALL_ED
Implemented All Universal Safety Interventions:  [place] to call system. Call bell, personal items and telephone within reach. Instruct patient to call for assistance. Room bathroom lighting operational. Non-slip footwear when patient is off stretcher. Physically safe environment: no spills, clutter or unnecessary equipment. Stretcher in lowest position, wheels locked, appropriate side rails in place.
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
## 2019-04-19 NOTE — ED PROVIDER NOTE - NS_ ATTENDINGSCRIBEDETAILS _ED_A_ED_FT
I agree with scribe documentation  signed out to Dr. Wang pending CT max face to evaluate for nasal fractures

## 2019-05-16 ENCOUNTER — APPOINTMENT (OUTPATIENT)
Dept: ENDOCRINOLOGY | Facility: CLINIC | Age: 29
End: 2019-05-16

## 2019-05-29 ENCOUNTER — EMERGENCY (EMERGENCY)
Facility: HOSPITAL | Age: 29
LOS: 1 days | Discharge: HOME | End: 2019-05-29
Attending: EMERGENCY MEDICINE | Admitting: EMERGENCY MEDICINE
Payer: MEDICARE

## 2019-05-29 VITALS
HEART RATE: 93 BPM | DIASTOLIC BLOOD PRESSURE: 97 MMHG | RESPIRATION RATE: 18 BRPM | SYSTOLIC BLOOD PRESSURE: 144 MMHG | TEMPERATURE: 98 F | OXYGEN SATURATION: 99 %

## 2019-05-29 VITALS
SYSTOLIC BLOOD PRESSURE: 148 MMHG | DIASTOLIC BLOOD PRESSURE: 107 MMHG | HEART RATE: 98 BPM | TEMPERATURE: 98 F | OXYGEN SATURATION: 99 % | RESPIRATION RATE: 20 BRPM

## 2019-05-29 PROCEDURE — 71046 X-RAY EXAM CHEST 2 VIEWS: CPT | Mod: 26

## 2019-05-29 PROCEDURE — 99283 EMERGENCY DEPT VISIT LOW MDM: CPT | Mod: GC

## 2019-05-29 RX ORDER — FAMOTIDINE 10 MG/ML
20 INJECTION INTRAVENOUS ONCE
Refills: 0 | Status: COMPLETED | OUTPATIENT
Start: 2019-05-29 | End: 2019-05-29

## 2019-05-29 RX ADMIN — Medication 30 MILLILITER(S): at 22:27

## 2019-05-29 RX ADMIN — FAMOTIDINE 20 MILLIGRAM(S): 10 INJECTION INTRAVENOUS at 22:27

## 2019-05-29 NOTE — ED PROVIDER NOTE - ATTENDING CONTRIBUTION TO CARE
I performed a face to face bedside interview with patient regarding history of present illness, review of symptoms and past medical history. I completed an independent physical exam.  I have discussed patient's plan of care.   I agree with note as stated above, having amended the EMR as needed to reflect my findings. I have discussed the assessment and plan of care.  This includes during the time I functioned as the attending physician for this patient.  Attending Contribution to Care: agree with plan of resident. 29M h/o developmental disability p/w CP. Ate multiple (4->6) bowls of macaroni and then felt mid sternal non-radiating CP an hr later. Denies SOB, fever, palpitations, family h/o cardiac disease. pain started after eating macaroni. cxr wnl.. ekg nsr with no ischemic changes. vss. hd stable. stable for d/c.

## 2019-05-29 NOTE — ED PROVIDER NOTE - OBJECTIVE STATEMENT
29M h/o developmental disability p/w CP. Ate multiple (4->6) bowls of macaroni and then felt mid sternal non-radiating CP an hr later. Denies SOB, fever, palpitations, family h/o cardiac disease.

## 2019-05-29 NOTE — ED ADULT NURSE NOTE - OBJECTIVE STATEMENT
30 y/o male aaox3 and ambulatory c/o of chest discomfort. Pt states after eating 6 bowls of macaroni and cheese he started feeling a burning/tightness in his chest. Pt states he feels some pain in the upper 30 y/o male aaox3 and ambulatory c/o of chest discomfort. Pt states after eating 6 bowls of macaroni and cheese he started feeling a burning/tightness in his chest. Pt states he feels some pain in the upper abdomen. Pt denies SOB, HA, vision changes, lightheadedness. PMH MR; pt father at bedside denies any other medical conditions. Vital signs as noted. Call light in reach. Pt medicated as per MD order. Will continue to monitor

## 2019-05-29 NOTE — ED PROVIDER NOTE - NSFOLLOWUPINSTRUCTIONS_ED_ALL_ED_FT
- Follow up with your doctor within 2-3 days.   - Bring results with you to the appointment.   - Consider taking Pepcid and Maalox for continued indigestion, medications are available over the counter.   - Return to the ED for any new or worsening symptoms.     Chest Pain    Chest pain can be caused by many different conditions which may or may not be dangerous. Causes include heartburn, lung infections, heart attack, blood clot in lungs, skin infections, strain or damage to muscle, cartilage, or bones, etc. In addition to a history and physical examination, an electrocardiogram (ECG) or other lab tests may have been performed to determine the cause of your chest pain. Follow up with your primary care provider or with a cardiologist as instructed.     SEEK IMMEDIATE MEDICAL CARE IF YOU HAVE ANY OF THE FOLLOWING SYMPTOMS: worsening chest pain, coughing up blood, unexplained back/neck/jaw pain, severe abdominal pain, dizziness or lightheadedness, fainting, shortness of breath, sweaty or clammy skin, vomiting, or racing heart beat. These symptoms may represent a serious problem that is an emergency. Do not wait to see if the symptoms will go away. Get medical help right away. Call 911 and do not drive yourself to the hospital.

## 2019-05-29 NOTE — ED PROVIDER NOTE - CLINICAL SUMMARY MEDICAL DECISION MAKING FREE TEXT BOX
Pt with CP after eating 4-6 bowls of macaroni. Atypical chest pain not consistent with ACS  1) EKG/CXR/pepcid/maalox  2) Reassess
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
## 2019-05-31 ENCOUNTER — OUTPATIENT (OUTPATIENT)
Dept: OUTPATIENT SERVICES | Facility: HOSPITAL | Age: 29
LOS: 1 days | Discharge: STILL ADMITTED OR ADMINISTRATIVE | End: 2019-05-31
Payer: MEDICARE

## 2019-05-31 PROCEDURE — 99213 OFFICE O/P EST LOW 20 MIN: CPT

## 2019-06-03 DIAGNOSIS — F63.81 INTERMITTENT EXPLOSIVE DISORDER: ICD-10-CM

## 2019-06-03 DIAGNOSIS — F25.0 SCHIZOAFFECTIVE DISORDER, BIPOLAR TYPE: ICD-10-CM

## 2019-06-26 ENCOUNTER — APPOINTMENT (OUTPATIENT)
Dept: ENDOCRINOLOGY | Facility: CLINIC | Age: 29
End: 2019-06-26

## 2019-08-27 ENCOUNTER — OUTPATIENT (OUTPATIENT)
Dept: OUTPATIENT SERVICES | Facility: HOSPITAL | Age: 29
LOS: 1 days | Discharge: HOME | End: 2019-08-27

## 2019-08-27 PROCEDURE — 90839 PSYTX CRISIS INITIAL 60 MIN: CPT

## 2019-08-28 DIAGNOSIS — F25.0 SCHIZOAFFECTIVE DISORDER, BIPOLAR TYPE: ICD-10-CM

## 2019-08-28 DIAGNOSIS — E11.9 TYPE 2 DIABETES MELLITUS WITHOUT COMPLICATIONS: ICD-10-CM

## 2019-08-28 DIAGNOSIS — F63.81 INTERMITTENT EXPLOSIVE DISORDER: ICD-10-CM

## 2019-09-03 NOTE — ED ADULT TRIAGE NOTE - NS ED NURSE BANDS TYPE
[FreeTextEntry8] : passed out today morning\par Reports that she works from home. Had a fruit and walked outside. Felt nauseous and the next thing she knew she was on the floor with a bump on her head and pain at that site.\par She reports her gyn did labs 1/19 and she has no medical Hx.\par Similar episode a few years ago. She went to [facility] at that time. She was driving and felt nauseous with a strong urge to urinate. She reports the w/u was negative and she had T wave inversion in precordial leads which was a variation of normal.
Name band;

## 2019-09-08 ENCOUNTER — EMERGENCY (EMERGENCY)
Facility: HOSPITAL | Age: 29
LOS: 1 days | Discharge: HOME | End: 2019-09-08
Attending: EMERGENCY MEDICINE | Admitting: EMERGENCY MEDICINE
Payer: MEDICARE

## 2019-09-08 VITALS
RESPIRATION RATE: 20 BRPM | HEART RATE: 91 BPM | SYSTOLIC BLOOD PRESSURE: 125 MMHG | OXYGEN SATURATION: 100 % | TEMPERATURE: 97 F | DIASTOLIC BLOOD PRESSURE: 89 MMHG

## 2019-09-08 LAB
ALBUMIN SERPL ELPH-MCNC: 4.2 G/DL — SIGNIFICANT CHANGE UP (ref 3.3–5)
ALP SERPL-CCNC: 69 U/L — SIGNIFICANT CHANGE UP (ref 40–120)
ALT FLD-CCNC: 10 U/L — SIGNIFICANT CHANGE UP (ref 4–41)
ANION GAP SERPL CALC-SCNC: 14 MMO/L — SIGNIFICANT CHANGE UP (ref 7–14)
AST SERPL-CCNC: 13 U/L — SIGNIFICANT CHANGE UP (ref 4–40)
B-OH-BUTYR SERPL-SCNC: < 0 MMOL/L — LOW (ref 0–0.4)
BASE EXCESS BLDV CALC-SCNC: -0.2 MMOL/L — SIGNIFICANT CHANGE UP
BASE EXCESS BLDV CALC-SCNC: 1.9 MMOL/L — SIGNIFICANT CHANGE UP
BASOPHILS # BLD AUTO: 0.03 K/UL — SIGNIFICANT CHANGE UP (ref 0–0.2)
BASOPHILS NFR BLD AUTO: 0.4 % — SIGNIFICANT CHANGE UP (ref 0–2)
BILIRUB SERPL-MCNC: 0.3 MG/DL — SIGNIFICANT CHANGE UP (ref 0.2–1.2)
BLOOD GAS VENOUS - CREATININE: 0.69 MG/DL — SIGNIFICANT CHANGE UP (ref 0.5–1.3)
BLOOD GAS VENOUS - CREATININE: 0.72 MG/DL — SIGNIFICANT CHANGE UP (ref 0.5–1.3)
BLOOD GAS VENOUS - FIO2: 21 — SIGNIFICANT CHANGE UP
BUN SERPL-MCNC: 9 MG/DL — SIGNIFICANT CHANGE UP (ref 7–23)
CALCIUM SERPL-MCNC: 9.6 MG/DL — SIGNIFICANT CHANGE UP (ref 8.4–10.5)
CHLORIDE BLDV-SCNC: 101 MMOL/L — SIGNIFICANT CHANGE UP (ref 96–108)
CHLORIDE BLDV-SCNC: 104 MMOL/L — SIGNIFICANT CHANGE UP (ref 96–108)
CHLORIDE SERPL-SCNC: 96 MMOL/L — LOW (ref 98–107)
CO2 SERPL-SCNC: 21 MMOL/L — LOW (ref 22–31)
CREAT SERPL-MCNC: 0.73 MG/DL — SIGNIFICANT CHANGE UP (ref 0.5–1.3)
EOSINOPHIL # BLD AUTO: 0.02 K/UL — SIGNIFICANT CHANGE UP (ref 0–0.5)
EOSINOPHIL NFR BLD AUTO: 0.3 % — SIGNIFICANT CHANGE UP (ref 0–6)
GAS PNL BLDV: 127 MMOL/L — LOW (ref 136–146)
GAS PNL BLDV: 129 MMOL/L — LOW (ref 136–146)
GLUCOSE BLDV-MCNC: 531 MG/DL — CRITICAL HIGH (ref 70–99)
GLUCOSE BLDV-MCNC: 611 MG/DL — CRITICAL HIGH (ref 70–99)
GLUCOSE SERPL-MCNC: 642 MG/DL — CRITICAL HIGH (ref 70–99)
HCO3 BLDV-SCNC: 23 MMOL/L — SIGNIFICANT CHANGE UP (ref 20–27)
HCO3 BLDV-SCNC: 23 MMOL/L — SIGNIFICANT CHANGE UP (ref 20–27)
HCT VFR BLD CALC: 41.6 % — SIGNIFICANT CHANGE UP (ref 39–50)
HCT VFR BLDV CALC: 45.3 % — SIGNIFICANT CHANGE UP (ref 39–51)
HCT VFR BLDV CALC: 46.4 % — SIGNIFICANT CHANGE UP (ref 39–51)
HGB BLD-MCNC: 14 G/DL — SIGNIFICANT CHANGE UP (ref 13–17)
HGB BLDV-MCNC: 14.8 G/DL — SIGNIFICANT CHANGE UP (ref 13–17)
HGB BLDV-MCNC: 15.1 G/DL — SIGNIFICANT CHANGE UP (ref 13–17)
IMM GRANULOCYTES NFR BLD AUTO: 0.4 % — SIGNIFICANT CHANGE UP (ref 0–1.5)
LACTATE BLDV-MCNC: 3.2 MMOL/L — HIGH (ref 0.5–2)
LACTATE BLDV-MCNC: 4.2 MMOL/L — CRITICAL HIGH (ref 0.5–2)
LYMPHOCYTES # BLD AUTO: 2.77 K/UL — SIGNIFICANT CHANGE UP (ref 1–3.3)
LYMPHOCYTES # BLD AUTO: 36.4 % — SIGNIFICANT CHANGE UP (ref 13–44)
MCHC RBC-ENTMCNC: 28 PG — SIGNIFICANT CHANGE UP (ref 27–34)
MCHC RBC-ENTMCNC: 33.7 % — SIGNIFICANT CHANGE UP (ref 32–36)
MCV RBC AUTO: 83.2 FL — SIGNIFICANT CHANGE UP (ref 80–100)
MONOCYTES # BLD AUTO: 0.51 K/UL — SIGNIFICANT CHANGE UP (ref 0–0.9)
MONOCYTES NFR BLD AUTO: 6.7 % — SIGNIFICANT CHANGE UP (ref 2–14)
NEUTROPHILS # BLD AUTO: 4.25 K/UL — SIGNIFICANT CHANGE UP (ref 1.8–7.4)
NEUTROPHILS NFR BLD AUTO: 55.8 % — SIGNIFICANT CHANGE UP (ref 43–77)
NRBC # FLD: 0 K/UL — SIGNIFICANT CHANGE UP (ref 0–0)
PCO2 BLDV: 44 MMHG — SIGNIFICANT CHANGE UP (ref 41–51)
PCO2 BLDV: 53 MMHG — HIGH (ref 41–51)
PH BLDV: 7.33 PH — SIGNIFICANT CHANGE UP (ref 7.32–7.43)
PH BLDV: 7.36 PH — SIGNIFICANT CHANGE UP (ref 7.32–7.43)
PLATELET # BLD AUTO: 211 K/UL — SIGNIFICANT CHANGE UP (ref 150–400)
PMV BLD: 10.8 FL — SIGNIFICANT CHANGE UP (ref 7–13)
PO2 BLDV: 17 MMHG — LOW (ref 35–40)
PO2 BLDV: 42 MMHG — HIGH (ref 35–40)
POTASSIUM BLDV-SCNC: 4 MMOL/L — SIGNIFICANT CHANGE UP (ref 3.4–4.5)
POTASSIUM BLDV-SCNC: 4.3 MMOL/L — SIGNIFICANT CHANGE UP (ref 3.4–4.5)
POTASSIUM SERPL-MCNC: 4.7 MMOL/L — SIGNIFICANT CHANGE UP (ref 3.5–5.3)
POTASSIUM SERPL-SCNC: 4.7 MMOL/L — SIGNIFICANT CHANGE UP (ref 3.5–5.3)
PROT SERPL-MCNC: 7.4 G/DL — SIGNIFICANT CHANGE UP (ref 6–8.3)
RBC # BLD: 5 M/UL — SIGNIFICANT CHANGE UP (ref 4.2–5.8)
RBC # FLD: 12.6 % — SIGNIFICANT CHANGE UP (ref 10.3–14.5)
SAO2 % BLDV: 18.4 % — LOW (ref 60–85)
SAO2 % BLDV: 74.6 % — SIGNIFICANT CHANGE UP (ref 60–85)
SODIUM SERPL-SCNC: 131 MMOL/L — LOW (ref 135–145)
WBC # BLD: 7.61 K/UL — SIGNIFICANT CHANGE UP (ref 3.8–10.5)
WBC # FLD AUTO: 7.61 K/UL — SIGNIFICANT CHANGE UP (ref 3.8–10.5)

## 2019-09-08 PROCEDURE — 99284 EMERGENCY DEPT VISIT MOD MDM: CPT | Mod: GC

## 2019-09-08 RX ORDER — SODIUM CHLORIDE 9 MG/ML
2000 INJECTION INTRAMUSCULAR; INTRAVENOUS; SUBCUTANEOUS ONCE
Refills: 0 | Status: COMPLETED | OUTPATIENT
Start: 2019-09-08 | End: 2019-09-08

## 2019-09-08 RX ORDER — INSULIN LISPRO 100/ML
8 VIAL (ML) SUBCUTANEOUS ONCE
Refills: 0 | Status: COMPLETED | OUTPATIENT
Start: 2019-09-08 | End: 2019-09-08

## 2019-09-08 RX ORDER — DEXTROSE MONOHYDRATE, SODIUM CHLORIDE, AND POTASSIUM CHLORIDE 50; .745; 4.5 G/1000ML; G/1000ML; G/1000ML
1000 INJECTION, SOLUTION INTRAVENOUS
Refills: 0 | Status: DISCONTINUED | OUTPATIENT
Start: 2019-09-08 | End: 2019-09-09

## 2019-09-08 RX ORDER — INSULIN HUMAN 100 [IU]/ML
8 INJECTION, SOLUTION SUBCUTANEOUS
Qty: 100 | Refills: 0 | Status: DISCONTINUED | OUTPATIENT
Start: 2019-09-08 | End: 2019-09-08

## 2019-09-08 RX ORDER — INSULIN HUMAN 100 [IU]/ML
8 INJECTION, SOLUTION SUBCUTANEOUS
Qty: 50 | Refills: 0 | Status: DISCONTINUED | OUTPATIENT
Start: 2019-09-08 | End: 2019-09-08

## 2019-09-08 RX ORDER — POTASSIUM CHLORIDE 20 MEQ
40 PACKET (EA) ORAL ONCE
Refills: 0 | Status: COMPLETED | OUTPATIENT
Start: 2019-09-08 | End: 2019-09-08

## 2019-09-08 RX ADMIN — SODIUM CHLORIDE 2000 MILLILITER(S): 9 INJECTION INTRAMUSCULAR; INTRAVENOUS; SUBCUTANEOUS at 21:59

## 2019-09-08 NOTE — ED PROVIDER NOTE - PHYSICAL EXAMINATION
PHYSICAL EXAM:    GENERAL: Comfortable, no acute distress   HEAD:  Normocephalic, atraumatic  EYES: EOMI, PERRLA  HEENT: Moist mucous membranes  NECK: Supple, No JVD  NERVOUS SYSTEM: awake and alert, moving all 4 extremities   CHEST/LUNG: Clear to auscultation bilaterally  HEART: Regular rate and rhythm, no murmur   ABDOMEN: +BS, soft, non tender, non distended   EXTREMITIES:   No clubbing, cyanosis, or edema  MUSCULOSKELETAL: No muscle tenderness, no joint tenderness  SKIN: warm and dry, no rash
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
## 2019-09-08 NOTE — ED ADULT NURSE NOTE - NSIMPLEMENTINTERV_GEN_ALL_ED
Implemented All Universal Safety Interventions:  [place] to call system. Call bell, personal items and telephone within reach. Instruct patient to call for assistance. Room bathroom lighting operational. Non-slip footwear when patient is off stretcher. Physically safe environment: no spills, clutter or unnecessary equipment. Stretcher in lowest position, wheels locked, appropriate side rails in place.

## 2019-09-08 NOTE — ED PROVIDER NOTE - CLINICAL SUMMARY MEDICAL DECISION MAKING FREE TEXT BOX
Cabot: 29M with PMH of NSTEMI (1/2019 on DAPT), MR, bipolar d/o, IDDM, brought in by his family for medication non compliance and aggression, also found to have eaten large amounts of mac and cheese and not taken his insulin.  He denies abd pain, N/V/urinary sx.  FS 600s.  On exam, HDS, NAD, MMM, eyes clear, lungs CTAB, heart sounds normal, abd soft, NT, ND, no CVAT, LEs without edema, wwp, skin normal temperature and color, neuro: alert and oriented, no focal deficits.  AG 14, B-OH neg, not in DKA but concern for impending HONK.  Will hydrate, start insulin gtt, consult MICU.

## 2019-09-08 NOTE — ED PROVIDER NOTE - PROGRESS NOTE DETAILS
Misael PGY3: Patient calm and cooperative. Spoke with psychiatry. Psychiatry does not think consult is appropriate given patient's history of MR and bipolar and agitation and aggression are likely to occur occasionally. States they will not change medications acutely in the ED and suggests that patient follow up with outpatient psychiatrist for follow up. Spoke with patient's mother who states patient does have outbursts and the last one was last month. Mom threatened to call police if patient did not take meds and patient complied but this time patient said he didn't care if she called the  so she did. She is comfortable taking him home and having him follow up with his psychiatrist. Spoke with patient and explained importance of taking his medications including insulin and he stated he understood and will take his medications at home.

## 2019-09-08 NOTE — ED PROVIDER NOTE - PATIENT PORTAL LINK FT
You can access the FollowMyHealth Patient Portal offered by [facility] by registering at the following website: [email or web address]. By joining [employer]’s FollowMyHealth portal, you will also be able to view your health information using other applications (apps) compatible with our system.
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
## 2019-09-08 NOTE — ED PROVIDER NOTE - ATTENDING CONTRIBUTION TO CARE
I, Jennifer Cabot, MD, have performed a history and physical exam of the patient and discussed their management with the resident. I reviewed the resident's note and agree with the documented findings and plan of care. My medical decision making and observations are found above.    Cabot: 29M with PMH of NSTEMI (1/2019 on DAPT), MR, amira d/o, IDDM, brought in by his family for medication non compliance and aggression, also found to have eaten large amounts of mac and cheese and not taken his insulin.  He denies abd pain, N/V/urinary sx.  FS 600s.  On exam, HDS, NAD, MMM, eyes clear, lungs CTAB, heart sounds normal, abd soft, NT, ND, no CVAT, LEs without edema, wwp, skin normal temperature and color, neuro: alert and oriented, no focal deficits.  AG 14, B-OH neg, not in DKA but concern for impending HONK.  Will hydrate, start insulin gtt, consult MICU.

## 2019-09-08 NOTE — ED PROVIDER NOTE - CARE PLAN
Principal Discharge DX:	Hyperglycemia Principal Discharge DX:	Hyperglycemia  Assessment and plan of treatment:	Please make sure you take your insulin. Please follow up with your primary care doctor and your psychiatrist in 1 week.

## 2019-09-08 NOTE — ED PROVIDER NOTE - OBJECTIVE STATEMENT
The patient is a 30 y/o man with PMH significant for NSTEMI (1/2019 on DAPT), MR, DM2 BIBEMS for hyperglycemia. The patient states that he went to [facility] ER today and had a cheeseburger with fries and then also had cake. He did not let his mom give him his insulin. He states "it hurts" when she gives it to him so he refused. He denies any fevers, chills, CP, SOB, nausea, vomiting, or diarrhea. He said he had some abdominal pain but it went away after he had a bowel movement. He endorses polyuria.

## 2019-09-08 NOTE — ED ADULT NURSE NOTE - OBJECTIVE STATEMENT
29yom hx MR, bipolar, DM2, schizoaffective presents to ed 22 for hyperglycemia. pt family reportedly called ems 2/2 agitation and aggression, pt calm, cooperative upon ED assessment. pt FS noted to be elevated. pt reports he did not take his insulin because "it hurts." pt denies medical complaints at this time. denies cp, sob, dizziness, lightheadedness, n/v/d, fever/chills. breathing even/unlabored. abdomen soft, nontender, nondistended. skin warm, dry, and intact. 20g iv lock placed to L ac. labs sent. will continue to monitor.

## 2019-09-08 NOTE — ED PROVIDER NOTE - NSFOLLOWUPINSTRUCTIONS_ED_ALL_ED_FT
1) Please follow up with your PMD and psychiatrist in 1 week.    2) It is very important for you to take your medications.

## 2019-09-08 NOTE — ED PROVIDER NOTE - PLAN OF CARE
Please make sure you take your insulin. Please follow up with your primary care doctor and your psychiatrist in 1 week.

## 2019-09-09 VITALS
DIASTOLIC BLOOD PRESSURE: 90 MMHG | RESPIRATION RATE: 17 BRPM | TEMPERATURE: 98 F | HEART RATE: 73 BPM | SYSTOLIC BLOOD PRESSURE: 124 MMHG | OXYGEN SATURATION: 97 %

## 2019-09-09 RX ADMIN — Medication 40 MILLIEQUIVALENT(S): at 00:00

## 2019-09-09 RX ADMIN — Medication 8 UNIT(S): at 00:05

## 2019-09-09 NOTE — ED ADULT NURSE REASSESSMENT NOTE - NS ED NURSE REASSESS COMMENT FT1
report received from mid shift rn. [id number]&OX4 male presents to the ed today for hypertension and non-compliance with medication. pt Is calm and sleeping. FS done.

## 2019-09-29 ENCOUNTER — EMERGENCY (EMERGENCY)
Facility: HOSPITAL | Age: 29
LOS: 0 days | Discharge: HOME | End: 2019-09-30
Attending: EMERGENCY MEDICINE
Payer: MEDICARE

## 2019-09-29 VITALS
TEMPERATURE: 98 F | DIASTOLIC BLOOD PRESSURE: 74 MMHG | HEIGHT: 66 IN | RESPIRATION RATE: 17 BRPM | WEIGHT: 139.99 LBS | HEART RATE: 95 BPM | SYSTOLIC BLOOD PRESSURE: 137 MMHG | OXYGEN SATURATION: 99 %

## 2019-09-29 DIAGNOSIS — M41.9 SCOLIOSIS, UNSPECIFIED: ICD-10-CM

## 2019-09-29 DIAGNOSIS — F60.3 BORDERLINE PERSONALITY DISORDER: ICD-10-CM

## 2019-09-29 DIAGNOSIS — E11.65 TYPE 2 DIABETES MELLITUS WITH HYPERGLYCEMIA: ICD-10-CM

## 2019-09-29 DIAGNOSIS — Z79.4 LONG TERM (CURRENT) USE OF INSULIN: ICD-10-CM

## 2019-09-29 DIAGNOSIS — F25.9 SCHIZOAFFECTIVE DISORDER, UNSPECIFIED: ICD-10-CM

## 2019-09-29 DIAGNOSIS — F79 UNSPECIFIED INTELLECTUAL DISABILITIES: ICD-10-CM

## 2019-09-29 LAB
ACETONE SERPL-MCNC: NEGATIVE — SIGNIFICANT CHANGE UP
BASOPHILS # BLD AUTO: 0.02 K/UL — SIGNIFICANT CHANGE UP (ref 0–0.2)
BASOPHILS NFR BLD AUTO: 0.3 % — SIGNIFICANT CHANGE UP (ref 0–2)
EOSINOPHIL # BLD AUTO: 0.02 K/UL — SIGNIFICANT CHANGE UP (ref 0–0.5)
EOSINOPHIL NFR BLD AUTO: 0.3 % — SIGNIFICANT CHANGE UP (ref 0–6)
GLUCOSE BLDC GLUCOMTR-MCNC: >600 MG/DL — CRITICAL HIGH (ref 70–99)
GLUCOSE BLDC GLUCOMTR-MCNC: >600 MG/DL — CRITICAL HIGH (ref 70–99)
HCT VFR BLD CALC: 44.2 % — SIGNIFICANT CHANGE UP (ref 39–50)
HGB BLD-MCNC: 15.6 G/DL — SIGNIFICANT CHANGE UP (ref 13–17)
IMM GRANULOCYTES NFR BLD AUTO: 0.1 % — SIGNIFICANT CHANGE UP (ref 0–1.5)
LYMPHOCYTES # BLD AUTO: 3.48 K/UL — HIGH (ref 1–3.3)
LYMPHOCYTES # BLD AUTO: 44.8 % — HIGH (ref 13–44)
MCHC RBC-ENTMCNC: 28.7 PG — SIGNIFICANT CHANGE UP (ref 27–34)
MCHC RBC-ENTMCNC: 35.3 GM/DL — SIGNIFICANT CHANGE UP (ref 32–36)
MCV RBC AUTO: 81.4 FL — SIGNIFICANT CHANGE UP (ref 80–100)
MONOCYTES # BLD AUTO: 0.71 K/UL — SIGNIFICANT CHANGE UP (ref 0–0.9)
MONOCYTES NFR BLD AUTO: 9.1 % — SIGNIFICANT CHANGE UP (ref 2–14)
NEUTROPHILS # BLD AUTO: 3.52 K/UL — SIGNIFICANT CHANGE UP (ref 1.8–7.4)
NEUTROPHILS NFR BLD AUTO: 45.4 % — SIGNIFICANT CHANGE UP (ref 43–77)
NRBC # BLD: 0 /100 WBCS — SIGNIFICANT CHANGE UP (ref 0–0)
PLATELET # BLD AUTO: 235 K/UL — SIGNIFICANT CHANGE UP (ref 150–400)
RBC # BLD: 5.43 M/UL — SIGNIFICANT CHANGE UP (ref 4.2–5.8)
RBC # FLD: 12.2 % — SIGNIFICANT CHANGE UP (ref 10.3–14.5)
WBC # BLD: 7.76 K/UL — SIGNIFICANT CHANGE UP (ref 3.8–10.5)
WBC # FLD AUTO: 7.76 K/UL — SIGNIFICANT CHANGE UP (ref 3.8–10.5)

## 2019-09-29 PROCEDURE — 99284 EMERGENCY DEPT VISIT MOD MDM: CPT

## 2019-09-29 RX ORDER — SODIUM CHLORIDE 9 MG/ML
1000 INJECTION INTRAMUSCULAR; INTRAVENOUS; SUBCUTANEOUS ONCE
Refills: 0 | Status: COMPLETED | OUTPATIENT
Start: 2019-09-29 | End: 2019-09-29

## 2019-09-29 RX ORDER — HALOPERIDOL DECANOATE 100 MG/ML
5 INJECTION INTRAMUSCULAR ONCE
Refills: 0 | Status: COMPLETED | OUTPATIENT
Start: 2019-09-29 | End: 2019-09-29

## 2019-09-29 RX ORDER — SODIUM CHLORIDE 9 MG/ML
1000 INJECTION, SOLUTION INTRAVENOUS ONCE
Refills: 0 | Status: COMPLETED | OUTPATIENT
Start: 2019-09-29 | End: 2019-09-29

## 2019-09-29 RX ORDER — INSULIN HUMAN 100 [IU]/ML
8 INJECTION, SOLUTION SUBCUTANEOUS ONCE
Refills: 0 | Status: COMPLETED | OUTPATIENT
Start: 2019-09-29 | End: 2019-09-29

## 2019-09-29 RX ADMIN — Medication 1 MILLIGRAM(S): at 23:30

## 2019-09-29 RX ADMIN — SODIUM CHLORIDE 1000 MILLILITER(S): 9 INJECTION INTRAMUSCULAR; INTRAVENOUS; SUBCUTANEOUS at 23:38

## 2019-09-29 RX ADMIN — SODIUM CHLORIDE 1000 MILLILITER(S): 9 INJECTION, SOLUTION INTRAVENOUS at 23:37

## 2019-09-29 RX ADMIN — INSULIN HUMAN 8 UNIT(S): 100 INJECTION, SOLUTION SUBCUTANEOUS at 23:42

## 2019-09-29 RX ADMIN — HALOPERIDOL DECANOATE 5 MILLIGRAM(S): 100 INJECTION INTRAMUSCULAR at 23:12

## 2019-09-29 NOTE — ED PROVIDER NOTE - PHYSICAL EXAMINATION
Gen: weeping  Head: NC, AT   Eyes: PERRL, EOMI, normal lids/conjunctiva  ENT: normal hearing, patent oropharynx without erythema/exudate, uvula midline  Neck: supple, no tenderness, Trachea midline  Pulm: Bilateral BS, normal resp effort, no wheeze/stridor/retractions  CV: RRR, no M/R/G, 2+ radial and dp pulses bl, no edema  Abd: soft, NT/ND, +BS, no hepatosplenomegaly  Mskel: extremities x4 with normal ROM and no joint effusions. no ctl spine ttp.   Skin: no rash, no bruising   Neuro: moving all extremities. not answering questions. allowing us to undress and treat him though

## 2019-09-29 NOTE — ED ADULT NURSE NOTE - NSIMPLEMENTINTERV_GEN_ALL_ED
Implemented All Universal Safety Interventions:  [place] to call system. Call bell, personal items and telephone within reach. Instruct patient to call for assistance. Room bathroom lighting operational. Non-slip footwear when patient is off stretcher. Physically safe environment: no spills, clutter or unnecessary equipment. Stretcher in lowest position, wheels locked, appropriate side rails in place.

## 2019-09-29 NOTE — ED PROVIDER NOTE - CLINICAL SUMMARY MEDICAL DECISION MAKING FREE TEXT BOX
hyperglycemia. hyperglycemia. blood sugar improved with insulin and fluids. no signs of dka or hnk. patient is calmer now and at baseline psychiatric functioning. ok for dc home.

## 2019-09-29 NOTE — ED PROVIDER NOTE - PROGRESS NOTE DETAILS
Dr. Jaime: I spoke with Sister Lynette who will come to  patient at 1PM. Reports patient follows with Dr. Ha, who manages his insulin dosing. States patient has been refusing to take insulin over past few days which is why his sugars are elevated. Requesting refill of patient's home dose of Haldol 20mg qdaily.

## 2019-09-29 NOTE — ED ADULT NURSE NOTE - OBJECTIVE STATEMENT
patient has been aggressive with his family today. As per EMS, he has been noncompliant with his Propanolol and Haldol for 2 days. he also seems to have ingested an unknown amount of alcohol while also smoking marijuana.

## 2019-09-29 NOTE — ED ADULT NURSE NOTE - NS ED NURSE RECORD ANOTHER VITAL SIGN
Problem: Wound:  Intervention: Assess pain status  See flowsheet  Intervention: Assess wound size, appearance and drainage  See flowsheet  Intervention: Assess pedal pulses bilaterally if patient has a foot or leg ulcer  See flowsheet    Goal: Will show signs of wound healing; wound closure and no evidence of infection  Will show signs of wound healing; wound closure and no evidence of infection  Outcome: Ongoing Yes

## 2019-09-29 NOTE — ED PROVIDER NOTE - OBJECTIVE STATEMENT
Pertinent PMH/PSH/FHx/SHx and Review of Systems contained within:  29M hx of schizophrenia, reportedly non compliant with haldol but on depakote, and intellectual disability and possibly diabetes pw agitation. sister Lynette called ems who brought him here. on arrival to the er, patient only crying and will not talk.  Fh and Sh not otherwise contributory  ROS otherwise negative

## 2019-09-29 NOTE — ED ADULT TRIAGE NOTE - CHIEF COMPLAINT QUOTE
no compliant with haldol , also drinks , suspect marihuana use ,diabeticts, has being aggressive at home, hx MR

## 2019-09-30 VITALS
RESPIRATION RATE: 16 BRPM | SYSTOLIC BLOOD PRESSURE: 119 MMHG | OXYGEN SATURATION: 97 % | HEART RATE: 83 BPM | TEMPERATURE: 97 F | DIASTOLIC BLOOD PRESSURE: 81 MMHG

## 2019-09-30 LAB
ALBUMIN SERPL ELPH-MCNC: 3.8 G/DL — SIGNIFICANT CHANGE UP (ref 3.3–5)
ALP SERPL-CCNC: 82 U/L — SIGNIFICANT CHANGE UP (ref 40–120)
ALT FLD-CCNC: 13 U/L — SIGNIFICANT CHANGE UP (ref 12–78)
AMPHET UR-MCNC: NEGATIVE — SIGNIFICANT CHANGE UP
ANION GAP SERPL CALC-SCNC: 13 MMOL/L — SIGNIFICANT CHANGE UP (ref 5–17)
APPEARANCE UR: CLEAR — SIGNIFICANT CHANGE UP
AST SERPL-CCNC: 14 U/L — LOW (ref 15–37)
BARBITURATES UR SCN-MCNC: NEGATIVE — SIGNIFICANT CHANGE UP
BENZODIAZ UR-MCNC: NEGATIVE — SIGNIFICANT CHANGE UP
BILIRUB SERPL-MCNC: 0.3 MG/DL — SIGNIFICANT CHANGE UP (ref 0.2–1.2)
BILIRUB UR-MCNC: NEGATIVE — SIGNIFICANT CHANGE UP
BUN SERPL-MCNC: 9 MG/DL — SIGNIFICANT CHANGE UP (ref 7–23)
CALCIUM SERPL-MCNC: 8.9 MG/DL — SIGNIFICANT CHANGE UP (ref 8.5–10.1)
CHLORIDE SERPL-SCNC: 94 MMOL/L — LOW (ref 96–108)
CO2 SERPL-SCNC: 22 MMOL/L — SIGNIFICANT CHANGE UP (ref 22–31)
COCAINE METAB.OTHER UR-MCNC: NEGATIVE — SIGNIFICANT CHANGE UP
COLOR SPEC: YELLOW — SIGNIFICANT CHANGE UP
CREAT SERPL-MCNC: 1.24 MG/DL — SIGNIFICANT CHANGE UP (ref 0.5–1.3)
DIFF PNL FLD: NEGATIVE — SIGNIFICANT CHANGE UP
ETHANOL SERPL-MCNC: <10 MG/DL — SIGNIFICANT CHANGE UP (ref 0–10)
GLUCOSE BLDC GLUCOMTR-MCNC: 273 MG/DL — HIGH (ref 70–99)
GLUCOSE BLDC GLUCOMTR-MCNC: 416 MG/DL — HIGH (ref 70–99)
GLUCOSE BLDC GLUCOMTR-MCNC: 437 MG/DL — HIGH (ref 70–99)
GLUCOSE SERPL-MCNC: 678 MG/DL — CRITICAL HIGH (ref 70–99)
GLUCOSE UR QL: 1000 MG/DL
KETONES UR-MCNC: ABNORMAL
LEUKOCYTE ESTERASE UR-ACNC: NEGATIVE — SIGNIFICANT CHANGE UP
MAGNESIUM SERPL-MCNC: 2.1 MG/DL — SIGNIFICANT CHANGE UP (ref 1.6–2.6)
METHADONE UR-MCNC: NEGATIVE — SIGNIFICANT CHANGE UP
NITRITE UR-MCNC: NEGATIVE — SIGNIFICANT CHANGE UP
OPIATES UR-MCNC: NEGATIVE — SIGNIFICANT CHANGE UP
PCP SPEC-MCNC: SIGNIFICANT CHANGE UP
PCP UR-MCNC: NEGATIVE — SIGNIFICANT CHANGE UP
PH UR: 5 — SIGNIFICANT CHANGE UP (ref 5–8)
POTASSIUM SERPL-MCNC: 4.4 MMOL/L — SIGNIFICANT CHANGE UP (ref 3.5–5.3)
POTASSIUM SERPL-SCNC: 4.4 MMOL/L — SIGNIFICANT CHANGE UP (ref 3.5–5.3)
PROT SERPL-MCNC: 8.2 GM/DL — SIGNIFICANT CHANGE UP (ref 6–8.3)
PROT UR-MCNC: NEGATIVE MG/DL — SIGNIFICANT CHANGE UP
SODIUM SERPL-SCNC: 129 MMOL/L — LOW (ref 135–145)
SP GR SPEC: 1 — LOW (ref 1.01–1.02)
THC UR QL: NEGATIVE — SIGNIFICANT CHANGE UP
UROBILINOGEN FLD QL: NEGATIVE MG/DL — SIGNIFICANT CHANGE UP
VALPROATE SERPL-MCNC: 78 UG/ML — SIGNIFICANT CHANGE UP (ref 50–100)

## 2019-09-30 RX ORDER — INSULIN HUMAN 100 [IU]/ML
6 INJECTION, SOLUTION SUBCUTANEOUS ONCE
Refills: 0 | Status: COMPLETED | OUTPATIENT
Start: 2019-09-30 | End: 2019-09-30

## 2019-09-30 RX ORDER — SODIUM CHLORIDE 9 MG/ML
1000 INJECTION, SOLUTION INTRAVENOUS ONCE
Refills: 0 | Status: COMPLETED | OUTPATIENT
Start: 2019-09-30 | End: 2019-09-30

## 2019-09-30 RX ORDER — SODIUM CHLORIDE 9 MG/ML
1000 INJECTION INTRAMUSCULAR; INTRAVENOUS; SUBCUTANEOUS ONCE
Refills: 0 | Status: COMPLETED | OUTPATIENT
Start: 2019-09-30 | End: 2019-09-30

## 2019-09-30 RX ADMIN — SODIUM CHLORIDE 1000 MILLILITER(S): 9 INJECTION, SOLUTION INTRAVENOUS at 02:40

## 2019-09-30 RX ADMIN — INSULIN HUMAN 6 UNIT(S): 100 INJECTION, SOLUTION SUBCUTANEOUS at 02:39

## 2019-09-30 RX ADMIN — SODIUM CHLORIDE 1000 MILLILITER(S): 9 INJECTION INTRAMUSCULAR; INTRAVENOUS; SUBCUTANEOUS at 02:44

## 2019-09-30 RX ADMIN — SODIUM CHLORIDE 1000 MILLILITER(S): 9 INJECTION INTRAMUSCULAR; INTRAVENOUS; SUBCUTANEOUS at 03:13

## 2019-09-30 RX ADMIN — INSULIN HUMAN 6 UNIT(S): 100 INJECTION, SOLUTION SUBCUTANEOUS at 03:09

## 2019-09-30 RX ADMIN — SODIUM CHLORIDE 1000 MILLILITER(S): 9 INJECTION, SOLUTION INTRAVENOUS at 03:13

## 2019-09-30 RX ADMIN — SODIUM CHLORIDE 1000 MILLILITER(S): 9 INJECTION INTRAMUSCULAR; INTRAVENOUS; SUBCUTANEOUS at 02:40

## 2019-09-30 NOTE — ED ADULT NURSE REASSESSMENT NOTE - NS ED NURSE REASSESS COMMENT FT1
SW unable to send anyone asked if office has another number for family per [place] no other number available
awake and cooperative disch pending contacting family Message left on family phone SW contacted

## 2019-10-08 NOTE — DIETITIAN INITIAL EVALUATION ADULT. - PT NOT SOURCE
Discussed with the patient    Medicine are okay Pt With Hx of schizo-affective schizophrenia, Bipolar type I, mild MR, Autism./confused

## 2019-10-12 NOTE — PROGRESS NOTE BEHAVIORAL HEALTH - NS ED BHA MED ROS EYES
No complaints
No
No complaints

## 2019-10-22 ENCOUNTER — EMERGENCY (EMERGENCY)
Facility: HOSPITAL | Age: 29
LOS: 1 days | Discharge: HOME | End: 2019-10-22
Attending: EMERGENCY MEDICINE | Admitting: EMERGENCY MEDICINE
Payer: MEDICARE

## 2019-10-22 VITALS
OXYGEN SATURATION: 97 % | DIASTOLIC BLOOD PRESSURE: 93 MMHG | RESPIRATION RATE: 20 BRPM | SYSTOLIC BLOOD PRESSURE: 142 MMHG | TEMPERATURE: 98 F | HEART RATE: 108 BPM

## 2019-10-22 VITALS
RESPIRATION RATE: 15 BRPM | TEMPERATURE: 98 F | SYSTOLIC BLOOD PRESSURE: 130 MMHG | DIASTOLIC BLOOD PRESSURE: 83 MMHG | HEART RATE: 93 BPM | OXYGEN SATURATION: 100 %

## 2019-10-22 LAB
ALBUMIN SERPL ELPH-MCNC: 4.1 G/DL — SIGNIFICANT CHANGE UP (ref 3.3–5)
ALP SERPL-CCNC: 61 U/L — SIGNIFICANT CHANGE UP (ref 40–120)
ALT FLD-CCNC: 11 U/L — SIGNIFICANT CHANGE UP (ref 4–41)
AMPHET UR-MCNC: NEGATIVE — SIGNIFICANT CHANGE UP
ANION GAP SERPL CALC-SCNC: 15 MMO/L — HIGH (ref 7–14)
APAP SERPL-MCNC: < 15 UG/ML — LOW (ref 15–25)
APPEARANCE UR: CLEAR — SIGNIFICANT CHANGE UP
AST SERPL-CCNC: 17 U/L — SIGNIFICANT CHANGE UP (ref 4–40)
B-OH-BUTYR SERPL-SCNC: 1.6 MMOL/L — HIGH (ref 0–0.4)
BARBITURATES UR SCN-MCNC: NEGATIVE — SIGNIFICANT CHANGE UP
BASE EXCESS BLDV CALC-SCNC: 1 MMOL/L — SIGNIFICANT CHANGE UP
BASOPHILS # BLD AUTO: 0.02 K/UL — SIGNIFICANT CHANGE UP (ref 0–0.2)
BASOPHILS NFR BLD AUTO: 0.3 % — SIGNIFICANT CHANGE UP (ref 0–2)
BENZODIAZ UR-MCNC: NEGATIVE — SIGNIFICANT CHANGE UP
BILIRUB SERPL-MCNC: 0.3 MG/DL — SIGNIFICANT CHANGE UP (ref 0.2–1.2)
BILIRUB UR-MCNC: NEGATIVE — SIGNIFICANT CHANGE UP
BLOOD GAS VENOUS - CREATININE: 0.79 MG/DL — SIGNIFICANT CHANGE UP (ref 0.5–1.3)
BLOOD GAS VENOUS - FIO2: 21 — SIGNIFICANT CHANGE UP
BLOOD UR QL VISUAL: NEGATIVE — SIGNIFICANT CHANGE UP
BUN SERPL-MCNC: 9 MG/DL — SIGNIFICANT CHANGE UP (ref 7–23)
CALCIUM SERPL-MCNC: 9.5 MG/DL — SIGNIFICANT CHANGE UP (ref 8.4–10.5)
CANNABINOIDS UR-MCNC: NEGATIVE — SIGNIFICANT CHANGE UP
CHLORIDE BLDV-SCNC: 106 MMOL/L — SIGNIFICANT CHANGE UP (ref 96–108)
CHLORIDE SERPL-SCNC: 97 MMOL/L — LOW (ref 98–107)
CO2 SERPL-SCNC: 19 MMOL/L — LOW (ref 22–31)
COCAINE METAB.OTHER UR-MCNC: NEGATIVE — SIGNIFICANT CHANGE UP
COLOR SPEC: SIGNIFICANT CHANGE UP
CREAT SERPL-MCNC: 0.78 MG/DL — SIGNIFICANT CHANGE UP (ref 0.5–1.3)
EOSINOPHIL # BLD AUTO: 0.01 K/UL — SIGNIFICANT CHANGE UP (ref 0–0.5)
EOSINOPHIL NFR BLD AUTO: 0.1 % — SIGNIFICANT CHANGE UP (ref 0–6)
ETHANOL BLD-MCNC: < 10 MG/DL — SIGNIFICANT CHANGE UP
GAS PNL BLDV: 131 MMOL/L — LOW (ref 136–146)
GLUCOSE BLDV-MCNC: 397 MG/DL — HIGH (ref 70–99)
GLUCOSE SERPL-MCNC: 487 MG/DL — CRITICAL HIGH (ref 70–99)
GLUCOSE UR-MCNC: >1000 — HIGH
HCO3 BLDV-SCNC: 23 MMOL/L — SIGNIFICANT CHANGE UP (ref 20–27)
HCT VFR BLD CALC: 42.4 % — SIGNIFICANT CHANGE UP (ref 39–50)
HCT VFR BLDV CALC: 49.1 % — SIGNIFICANT CHANGE UP (ref 39–51)
HGB BLD-MCNC: 14 G/DL — SIGNIFICANT CHANGE UP (ref 13–17)
HGB BLDV-MCNC: 16 G/DL — SIGNIFICANT CHANGE UP (ref 13–17)
IMM GRANULOCYTES NFR BLD AUTO: 0.4 % — SIGNIFICANT CHANGE UP (ref 0–1.5)
KETONES UR-MCNC: HIGH
LACTATE BLDV-MCNC: 1.9 MMOL/L — SIGNIFICANT CHANGE UP (ref 0.5–2)
LEUKOCYTE ESTERASE UR-ACNC: NEGATIVE — SIGNIFICANT CHANGE UP
LYMPHOCYTES # BLD AUTO: 2.36 K/UL — SIGNIFICANT CHANGE UP (ref 1–3.3)
LYMPHOCYTES # BLD AUTO: 30.2 % — SIGNIFICANT CHANGE UP (ref 13–44)
MCHC RBC-ENTMCNC: 27.7 PG — SIGNIFICANT CHANGE UP (ref 27–34)
MCHC RBC-ENTMCNC: 33 % — SIGNIFICANT CHANGE UP (ref 32–36)
MCV RBC AUTO: 84 FL — SIGNIFICANT CHANGE UP (ref 80–100)
METHADONE UR-MCNC: NEGATIVE — SIGNIFICANT CHANGE UP
MONOCYTES # BLD AUTO: 0.78 K/UL — SIGNIFICANT CHANGE UP (ref 0–0.9)
MONOCYTES NFR BLD AUTO: 10 % — SIGNIFICANT CHANGE UP (ref 2–14)
NEUTROPHILS # BLD AUTO: 4.62 K/UL — SIGNIFICANT CHANGE UP (ref 1.8–7.4)
NEUTROPHILS NFR BLD AUTO: 59 % — SIGNIFICANT CHANGE UP (ref 43–77)
NITRITE UR-MCNC: NEGATIVE — SIGNIFICANT CHANGE UP
NRBC # FLD: 0 K/UL — SIGNIFICANT CHANGE UP (ref 0–0)
OPIATES UR-MCNC: NEGATIVE — SIGNIFICANT CHANGE UP
OXYCODONE UR-MCNC: NEGATIVE — SIGNIFICANT CHANGE UP
PCO2 BLDV: 52 MMHG — HIGH (ref 41–51)
PCP UR-MCNC: NEGATIVE — SIGNIFICANT CHANGE UP
PH BLDV: 7.33 PH — SIGNIFICANT CHANGE UP (ref 7.32–7.43)
PH UR: 6 — SIGNIFICANT CHANGE UP (ref 5–8)
PLATELET # BLD AUTO: 166 K/UL — SIGNIFICANT CHANGE UP (ref 150–400)
PMV BLD: 11.6 FL — SIGNIFICANT CHANGE UP (ref 7–13)
PO2 BLDV: 26 MMHG — LOW (ref 35–40)
POTASSIUM BLDV-SCNC: 3.6 MMOL/L — SIGNIFICANT CHANGE UP (ref 3.4–4.5)
POTASSIUM SERPL-MCNC: 3.8 MMOL/L — SIGNIFICANT CHANGE UP (ref 3.5–5.3)
POTASSIUM SERPL-SCNC: 3.8 MMOL/L — SIGNIFICANT CHANGE UP (ref 3.5–5.3)
PROT SERPL-MCNC: 6.9 G/DL — SIGNIFICANT CHANGE UP (ref 6–8.3)
PROT UR-MCNC: NEGATIVE — SIGNIFICANT CHANGE UP
RBC # BLD: 5.05 M/UL — SIGNIFICANT CHANGE UP (ref 4.2–5.8)
RBC # FLD: 12.5 % — SIGNIFICANT CHANGE UP (ref 10.3–14.5)
SALICYLATES SERPL-MCNC: < 5 MG/DL — LOW (ref 15–30)
SAO2 % BLDV: 43.7 % — LOW (ref 60–85)
SODIUM SERPL-SCNC: 131 MMOL/L — LOW (ref 135–145)
SP GR SPEC: 1.04 — SIGNIFICANT CHANGE UP (ref 1–1.04)
TSH SERPL-MCNC: 4.13 UIU/ML — SIGNIFICANT CHANGE UP (ref 0.27–4.2)
UROBILINOGEN FLD QL: NORMAL — SIGNIFICANT CHANGE UP
WBC # BLD: 7.82 K/UL — SIGNIFICANT CHANGE UP (ref 3.8–10.5)
WBC # FLD AUTO: 7.82 K/UL — SIGNIFICANT CHANGE UP (ref 3.8–10.5)

## 2019-10-22 PROCEDURE — 90792 PSYCH DIAG EVAL W/MED SRVCS: CPT

## 2019-10-22 PROCEDURE — 99285 EMERGENCY DEPT VISIT HI MDM: CPT

## 2019-10-22 RX ORDER — ASPIRIN/CALCIUM CARB/MAGNESIUM 324 MG
81 TABLET ORAL DAILY
Refills: 0 | Status: DISCONTINUED | OUTPATIENT
Start: 2019-10-22 | End: 2019-10-28

## 2019-10-22 RX ORDER — BENZTROPINE MESYLATE 1 MG
1 TABLET ORAL ONCE
Refills: 0 | Status: COMPLETED | OUTPATIENT
Start: 2019-10-22 | End: 2019-10-22

## 2019-10-22 RX ORDER — HALOPERIDOL DECANOATE 100 MG/ML
15 INJECTION INTRAMUSCULAR ONCE
Refills: 0 | Status: COMPLETED | OUTPATIENT
Start: 2019-10-22 | End: 2019-10-22

## 2019-10-22 RX ORDER — SODIUM CHLORIDE 9 MG/ML
2000 INJECTION INTRAMUSCULAR; INTRAVENOUS; SUBCUTANEOUS ONCE
Refills: 0 | Status: COMPLETED | OUTPATIENT
Start: 2019-10-22 | End: 2019-10-22

## 2019-10-22 RX ORDER — HALOPERIDOL DECANOATE 100 MG/ML
5 INJECTION INTRAMUSCULAR ONCE
Refills: 0 | Status: COMPLETED | OUTPATIENT
Start: 2019-10-22 | End: 2019-10-22

## 2019-10-22 RX ORDER — LISINOPRIL 2.5 MG/1
2.5 TABLET ORAL DAILY
Refills: 0 | Status: DISCONTINUED | OUTPATIENT
Start: 2019-10-22 | End: 2019-10-28

## 2019-10-22 RX ORDER — INSULIN HUMAN 100 [IU]/ML
5 INJECTION, SOLUTION SUBCUTANEOUS ONCE
Refills: 0 | Status: COMPLETED | OUTPATIENT
Start: 2019-10-22 | End: 2019-10-22

## 2019-10-22 RX ORDER — POTASSIUM CHLORIDE 20 MEQ
40 PACKET (EA) ORAL ONCE
Refills: 0 | Status: COMPLETED | OUTPATIENT
Start: 2019-10-22 | End: 2019-10-22

## 2019-10-22 RX ORDER — DIVALPROEX SODIUM 500 MG/1
750 TABLET, DELAYED RELEASE ORAL ONCE
Refills: 0 | Status: COMPLETED | OUTPATIENT
Start: 2019-10-22 | End: 2019-10-22

## 2019-10-22 RX ADMIN — Medication 1 MILLIGRAM(S): at 19:10

## 2019-10-22 RX ADMIN — DIVALPROEX SODIUM 750 MILLIGRAM(S): 500 TABLET, DELAYED RELEASE ORAL at 19:10

## 2019-10-22 RX ADMIN — SODIUM CHLORIDE 2000 MILLILITER(S): 9 INJECTION INTRAMUSCULAR; INTRAVENOUS; SUBCUTANEOUS at 13:42

## 2019-10-22 RX ADMIN — HALOPERIDOL DECANOATE 15 MILLIGRAM(S): 100 INJECTION INTRAMUSCULAR at 19:10

## 2019-10-22 RX ADMIN — Medication 2 MILLIGRAM(S): at 10:05

## 2019-10-22 RX ADMIN — HALOPERIDOL DECANOATE 5 MILLIGRAM(S): 100 INJECTION INTRAMUSCULAR at 10:05

## 2019-10-22 RX ADMIN — INSULIN HUMAN 5 UNIT(S): 100 INJECTION, SOLUTION SUBCUTANEOUS at 13:55
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
## 2019-10-22 NOTE — ED ADULT NURSE REASSESSMENT NOTE - NS ED NURSE REASSESS COMMENT FT1
pt left calmly and cooperate in NAD, pt was picked up by family member. pt belongings returned by PES.

## 2019-10-22 NOTE — ED ADULT NURSE REASSESSMENT NOTE - NS ED NURSE REASSESS COMMENT FT1
Received pt from day RN Paty, pt sleeping, respirations even and unlabored b/l. Rpt . NAD at this time. Awaiting dispo. Will continue to monitor.

## 2019-10-22 NOTE — ED BEHAVIORAL HEALTH ASSESSMENT NOTE - RISK ASSESSMENT
Risk factors include aggression history, noncompliance, past hospitalizations.  Protective factors include supportive family, calm and cooperative in the ED, in outpatient treatment. Pt has chronic baseline risk given hx of aggressive outbursts at home, impulsivity, and poor frustration tolerance in keeping with low intellectual functioning and mental retardation. Inpatient psychiatric hospitalization is unlikely to target these chronic risks. Pt is not at an acutely elevated risk, he does not pose an imminent danger to self or others and does not meet criteria for involuntary psychiatric admission. Low Acute Suicide Risk

## 2019-10-22 NOTE — ED BEHAVIORAL HEALTH ASSESSMENT NOTE - SAFETY PLAN ADDT'L DETAILS
Education provided regarding environmental safety / lethal means restriction/Provision of National Suicide Prevention Lifeline 4-071-478-SSLU (4165)/Safety plan discussed with...

## 2019-10-22 NOTE — ED PROVIDER NOTE - PHYSICAL EXAMINATION
HDS, tearful, agitated, MMM, eyes clear, lungs CTAB, heart sounds normal, abd soft, NT, ND, no CVAT, LEs without edema, wwp, skin normal temperature and color, neuro: alert and awake, no focal deficits

## 2019-10-22 NOTE — ED BEHAVIORAL HEALTH ASSESSMENT NOTE - SUICIDE PROTECTIVE FACTORS
Supportive social network of family or friends/Identifies reasons for living/Has future plans/Responsibility to family and others

## 2019-10-22 NOTE — ED ADULT NURSE NOTE - OBJECTIVE STATEMENT
Pt from  for hyperglycemia. Pt mediated before arrival to main ER. Pt sedated but arousable. IVL placed. Bloods drawn. Fluids hung. Will continue to monitor.

## 2019-10-22 NOTE — ED PROVIDER NOTE - OBJECTIVE STATEMENT
Cabot: 29M with PMH of MR, bipolar, and schizophrenia brought here by his sister for tearfulness and agitation in the setting of drinking ETOH and smoking marijuana last night.  He refused his home medications, which include haldol, divalproex, benztropine, lisinopril, and propanolol, and refused to go to the crisis center, where he goes once per month to get medication prescriptions.  No F/C/N/V/D/urinary sx/cough.  Of note, patient drinks ETOH daily. Cabot: 29M with PMH of MR, IDDM, bipolar d/o, and schizophrenia brought here by his sister for tearfulness and agitation in the setting of drinking ETOH and smoking marijuana last night.  He refused his home medications, which include haldol, divalproex, benztropine, lisinopril, and propanolol, and refused to go to the crisis center, where he goes once per month to get medication prescriptions.  No F/C/N/V/D/urinary sx/cough.  Of note, patient drinks ETOH daily.

## 2019-10-22 NOTE — ED PROVIDER NOTE - CLINICAL SUMMARY MEDICAL DECISION MAKING FREE TEXT BOX
Cabot: 29M with PMH of MR, bipolar, and schizophrenia brought here by his sister for tearfulness and agitation in the setting of drinking ETOH and smoking marijuana last night.  Exam remarkable for tearfulness, no signs of trauma or illness.  Will give haldol, ativan, reassess when calm.

## 2019-10-22 NOTE — ED BEHAVIORAL HEALTH ASSESSMENT NOTE - REFERRAL / APPOINTMENT DETAILS
Follow up at [facility] - Discussed with pt and sister plan for continued bridging of care until [facility]JAN appointment. Discussed that pt should be calm prior to leaving for appointment and not intoxicated.

## 2019-10-22 NOTE — ED BEHAVIORAL HEALTH ASSESSMENT NOTE - CASE SUMMARY
Patient is a 29 yo single, never  man of [place] descent with no dependents, domiciled at home with parents who are his caregivers, unemployed, diagnosed with Intermittent Explosive Disorder, mental retardation (IQ=40), and questionable dx of Schizoaffective d/o, multiple inpatient psychiatric admissions last at [facility] in 2015, with a long history of aggression toward family and chronic poor frustration tolerance; no history of suicide, no legal problems, +THC and ETOH use, no abuse history, PMH DM2 (poorly controlled) BIB sister for medication non-compliance and agitation at [facility].     On interview, pt is cooperative, pleasant with clear limited intellectual functioning. Pt's speech is dysarthric (consistent with baseline and prior evaluations) but not pressured or rapid. Pt is euthymic with concrete thought process and childlike demeanor. Denies SI/I/P or HI/I/P, denies delusions or AVH. He remained in good behavioral control throughout several hours stay in ED after initial IM medication. Pt is not presenting as acutely manic, psychotic or depressed. He has a chronic hx of aggressive outbursts at home, impulsivity, and poor frustration tolerance in keeping with low intellectual functioning and mental retardation. Inpatient psychiatric hospitalization is unlikely to target these chronic behaviors, and there is better evidence for the efficacy of behavioral management treatment in the community setting, pt and family may also need more supports in the community for this persistent challenging behavior. Pt has a follow up at [facility] in February - encouraged sister to call back and request earlier appointment with ER visit as evidence for need. Sister has no acute safety concerns and states pt's father will pick him up. Pt accepted PO medications this AM and again prior to discharge and voiced intent to remain compliant at home. No indication for admission at this time.

## 2019-10-22 NOTE — ED BEHAVIORAL HEALTH ASSESSMENT NOTE - SUMMARY
Patient is a 27 yo single, never  man of [place] descent with no dependents, domiciled at home with parents who are his caregivers, unemployed, diagnosed with Intermittent Explosive Disorder, mental retardation (IQ=40), and questionable dx of Schizoaffective d/o, multiple inpatient psychiatric admissions last at [facility] in 2015, with a long history of aggression toward family and chronic poor frustration tolerance; no history of suicide, no legal problems, +THC and ETOH use, no abuse history, PMH DM2 (poorly controlled) BIB sister for medication non-compliance and agitation at [facility].     On interview, pt is cooperative, pleasant with clear limited intellectual functioning. Pt's speech is dysarthric (consistent with baseline and prior evaluations) but not pressured or rapid. Pt is euthymic with concrete thought process and childlike demeanor. Denies SI/I/P or HI/I/P, denies delusions or AVH. He remained in good behavioral control throughout several hours stay in ED after initial IM medication. Pt is not presenting as acutely manic, psychotic or depressed. He has a chronic hx of aggressive outbursts at home, impulsivity, and poor frustration tolerance in keeping with low intellectual functioning and mental retardation. Inpatient psychiatric hospitalization is unlikely to target these chronic behaviors, and there is better evidence for the efficacy of behavioral management treatment in the community setting, pt and family may also need more supports in the community for this persistent challenging behavior. Pt has a follow up at [facility] in February - encouraged sister to call back and request earlier appointment with ER visit as evidence for need. Sister has no acute safety concerns and states pt's father will pick him up. Pt accepted PO medications this AM and again prior to discharge and voiced intent to remain compliant at home. No indication for admission at this time.

## 2019-10-22 NOTE — ED ADULT NURSE NOTE - NSIMPLEMENTINTERV_GEN_ALL_ED
Implemented All Fall Risk Interventions:  [place] to call system. Call bell, personal items and telephone within reach. Instruct patient to call for assistance. Room bathroom lighting operational. Non-slip footwear when patient is off stretcher. Physically safe environment: no spills, clutter or unnecessary equipment. Stretcher in lowest position, wheels locked, appropriate side rails in place. Provide visual cue, wrist band, yellow gown, etc. Monitor gait and stability. Monitor for mental status changes and reorient to person, place, and time. Review medications for side effects contributing to fall risk. Reinforce activity limits and safety measures with patient and family.

## 2019-10-22 NOTE — ED ADULT NURSE NOTE - NS ED NOTE  TALK SOMEONE YN
Principal Discharge DX:	Pneumomediastinum  Secondary Diagnosis:	Non-traumatic rhabdomyolysis No Principal Discharge DX:	Pneumomediastinum  Goal:	improved  Instructions for follow-up, activity and diet:	follow up with dr marks  Secondary Diagnosis:	Non-traumatic rhabdomyolysis  Goal:	improving  Instructions for follow-up, activity and diet:	told to inc hydration and bed rest for a few days

## 2019-10-22 NOTE — ED BEHAVIORAL HEALTH ASSESSMENT NOTE - OTHER
per HPI Sister Childlike Chronically impaired - normal in ER Dysarthric at baseline "I'm ok" [place] Impoverished

## 2019-10-22 NOTE — ED ADULT NURSE REASSESSMENT NOTE - NS ED NURSE REASSESS COMMENT FT1
Pt sleeping, arousable to verbal stimuli, resps even and unlabored b/l. Rpt fs 275. Pt medically cleared, pt to return to  per MD Agosto. IVL dc'ed.  RN Curt aware. Pt to be transported to  with PCA.

## 2019-10-22 NOTE — ED BEHAVIORAL HEALTH ASSESSMENT NOTE - PAST PSYCHOTROPIC MEDICATION
prolixin, prolixin dec, haldol dec. CORTEZ was discontinued as pt would get agitated in days preceded CORTEZ administration

## 2019-10-22 NOTE — ED BEHAVIORAL HEALTH ASSESSMENT NOTE - DESCRIPTION (FIRST USE, LAST USE, QUANTITY, FREQUENCY, DURATION)
Drank last night, pt does not drink daily but will engage in drinking occasionally with family Will smoke on occasion but unclear when was last use. Utox negative today

## 2019-10-22 NOTE — ED PROVIDER NOTE - PROGRESS NOTE DETAILS
Dr. Agosto: Pt was signed out to me with noted elevated glucose and treatment ordered with Psych eval pending.

## 2019-10-22 NOTE — ED ADULT NURSE REASSESSMENT NOTE - NS ED NURSE REASSESS COMMENT FT1
Critical lab value: Blood glucose 487 mg/dl.  Awaiting transfer to main area.  Dr. Cabot aware of pt's current blood glucose.

## 2019-10-22 NOTE — ED PROVIDER NOTE - NSFOLLOWUPINSTRUCTIONS_ED_ALL_ED_FT
1.  Please return to care for feelings of wanting to harm yourself or others.   2.  Take medications as prescribed.

## 2019-10-22 NOTE — ED BEHAVIORAL HEALTH ASSESSMENT NOTE - DESCRIPTION
Pt initially agitated and received IM medications, however spent several hours in the medical and  ED and remained in behavioral control without further agitation or aggression.     Vital Signs Last 24 Hrs  T(C): 36.9 (22 Oct 2019 15:52), Max: 36.9 (22 Oct 2019 15:52)  T(F): 98.4 (22 Oct 2019 15:52), Max: 98.4 (22 Oct 2019 15:52)  HR: 76 (22 Oct 2019 15:52) (75 - 108)  BP: 126/79 (22 Oct 2019 15:52) (123/83 - 146/101)  BP(mean): --  RR: 14 (22 Oct 2019 15:52) (14 - 20)  SpO2: 98% (22 Oct 2019 15:52) (97% - 98%) DM Lives with parents and siblings, disabled/unemployed, non-caregiver

## 2019-10-24 LAB
BACTERIA UR CULT: SIGNIFICANT CHANGE UP
SPECIMEN SOURCE: SIGNIFICANT CHANGE UP

## 2019-11-02 NOTE — CHART NOTE - NSCHARTNOTEFT_GEN_A_CORE
On 10/22 while in the ED, the patient was a danger to himself and others.  4 point restraints were ordered.  The patient soon thereafter met conditions to have the restraints discontinued, and they were taken off.

## 2019-11-08 ENCOUNTER — EMERGENCY (EMERGENCY)
Facility: HOSPITAL | Age: 29
LOS: 1 days | Discharge: HOME | End: 2019-11-08
Admitting: EMERGENCY MEDICINE
Payer: MEDICARE

## 2019-11-08 VITALS — SYSTOLIC BLOOD PRESSURE: 96 MMHG | DIASTOLIC BLOOD PRESSURE: 64 MMHG | HEART RATE: 74 BPM

## 2019-11-08 VITALS
SYSTOLIC BLOOD PRESSURE: 124 MMHG | OXYGEN SATURATION: 99 % | DIASTOLIC BLOOD PRESSURE: 93 MMHG | RESPIRATION RATE: 18 BRPM | TEMPERATURE: 98 F | HEART RATE: 97 BPM

## 2019-11-08 PROCEDURE — 99283 EMERGENCY DEPT VISIT LOW MDM: CPT

## 2019-11-08 RX ORDER — CHLORPROMAZINE HCL 10 MG
25 TABLET ORAL ONCE
Refills: 0 | Status: COMPLETED | OUTPATIENT
Start: 2019-11-08 | End: 2019-11-08

## 2019-11-08 RX ORDER — CHLORPROMAZINE HCL 10 MG
25 TABLET ORAL ONCE
Refills: 0 | Status: DISCONTINUED | OUTPATIENT
Start: 2019-11-08 | End: 2019-11-08

## 2019-11-08 RX ADMIN — Medication 25 MILLIGRAM(S): at 16:04

## 2019-11-08 NOTE — ED ADULT NURSE REASSESSMENT NOTE - NS ED NURSE REASSESS COMMENT FT1
Pt is awake, calm at baseline mental status. [facility] for DC to home by Anel PADILLA via EMS.

## 2019-11-08 NOTE — ED ADULT NURSE NOTE - CHIEF COMPLAINT QUOTE
Patient brought to ER from home by EMS. Pt was drinking rum last night. Today patient has been violent, throwing things, and non compliant with meds.

## 2019-11-08 NOTE — ED PROVIDER NOTE - PATIENT PORTAL LINK FT
You can access the FollowMyHealth Patient Portal offered by [facility] by registering at the following website: [email or web address]. By joining [employer]’s FollowMyHealth portal, you will also be able to view your health information using other applications (apps) compatible with our system.

## 2019-11-08 NOTE — ED PROVIDER NOTE - NSFOLLOWUPCLINICS_GEN_ALL_ED_FT
[facility]  Behavioral Health  [address] [place] [address]  Phone: [phone]  Fax:   Follow Up Time:

## 2019-11-08 NOTE — ED PROVIDER NOTE - CARE PLAN
Principal Discharge DX:	Impulse control disorder  Secondary Diagnosis:	Mental retardation  Secondary Diagnosis:	Bipolar 1 disorder
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
## 2019-11-08 NOTE — ED PROVIDER NOTE - CLINICAL SUMMARY MEDICAL DECISION MAKING FREE TEXT BOX
28 y/o M hx  MR, Bipolar    Medication offered.  Tolerated same well.    Medical evaluation performed. There is no clinical evidence of intoxication or any acute medical problem requiring immediate intervention.  D/C home via EMS. Recommend following up with [facility].

## 2019-11-08 NOTE — ED ADULT TRIAGE NOTE - CHIEF COMPLAINT QUOTE
Patient brought to ER from home by EMS. Pt was drinking rum last night. Today patient has been violent, throwing things, and non compliant with meds Patient brought to ER from home by EMS. Pt was drinking rum last night. Today patient has been violent, throwing things, and non compliant with meds.

## 2019-11-08 NOTE — ED BEHAVIORAL HEALTH NOTE - BEHAVIORAL HEALTH NOTE
ANNEMARIE spoke with patient's sister Lynette Caraballo, [phone] to inform of discharge via EMS. Nursing staff to set up transportation home.

## 2019-11-08 NOTE — ED PROVIDER NOTE - PSYCHIATRIC AFFECT
HEIGHTENED
56 y/o M w/ acute on chronic back pain/sciatica. Plan: Will give lidocaine, NSAIDs, muscle relaxants and reassess.

## 2019-11-11 ENCOUNTER — OUTPATIENT (OUTPATIENT)
Dept: OUTPATIENT SERVICES | Facility: HOSPITAL | Age: 29
LOS: 1 days | Discharge: HOME | End: 2019-11-11

## 2019-11-12 DIAGNOSIS — F25.9 SCHIZOAFFECTIVE DISORDER, UNSPECIFIED: ICD-10-CM

## 2019-11-20 NOTE — ED ADULT NURSE NOTE - NSSISCREENINGQ3_ED_A_ED
Discussed with patient that during a routine audit we discovered a variation in the temperature of our medication refrigerator which may have affected the potency and effectiveness of our vaccines. The vaccine was not harmful but may have been ineffective so we recommend revaccination. Appointment set up for revaccination. He verbalized understanding and all questions were answered.    No

## 2020-05-05 ENCOUNTER — OUTPATIENT (OUTPATIENT)
Dept: OUTPATIENT SERVICES | Facility: HOSPITAL | Age: 30
LOS: 1 days | Discharge: STILL ADMITTED OR ADMINISTRATIVE | End: 2020-05-05
Payer: MEDICARE

## 2020-05-09 DIAGNOSIS — F25.9 SCHIZOAFFECTIVE DISORDER, UNSPECIFIED: ICD-10-CM

## 2020-07-02 NOTE — PROGRESS NOTE ADULT - PROBLEM SELECTOR PLAN 2
The patient is calling back to schedule an appointment, but cannot get in until the end of August.  Please call her back so she can get in sooner.     Resolved. Initially occurred likely in setting of poorly uncontrolled DM and non-compliance with diet. no clear infectious source. UA negative. CXR clear.     FS still not controlled.  -Endocrine recs appreciated Lantus increased to 26 daily. Pt should get  this every morning.   -Will increase Humalog to 14/14/14 breakfast/lunch/dinner.

## 2020-07-14 ENCOUNTER — OUTPATIENT (OUTPATIENT)
Dept: OUTPATIENT SERVICES | Facility: HOSPITAL | Age: 30
LOS: 1 days | Discharge: STILL ADMITTED OR ADMINISTRATIVE | End: 2020-07-14

## 2020-07-14 PROCEDURE — 90839 PSYTX CRISIS INITIAL 60 MIN: CPT

## 2020-07-15 DIAGNOSIS — F71 MODERATE INTELLECTUAL DISABILITIES: ICD-10-CM

## 2020-07-15 DIAGNOSIS — F25.9 SCHIZOAFFECTIVE DISORDER, UNSPECIFIED: ICD-10-CM

## 2020-07-15 DIAGNOSIS — F63.81 INTERMITTENT EXPLOSIVE DISORDER: ICD-10-CM

## 2020-07-21 ENCOUNTER — APPOINTMENT (OUTPATIENT)
Dept: PSYCHIATRY | Facility: CLINIC | Age: 30
End: 2020-07-21
Payer: MEDICARE

## 2020-07-21 DIAGNOSIS — Z91.89 OTHER SPECIFIED PERSONAL RISK FACTORS, NOT ELSEWHERE CLASSIFIED: ICD-10-CM

## 2020-07-21 DIAGNOSIS — Z79.4 TYPE 2 DIABETES MELLITUS WITH OTHER SPECIFIED COMPLICATION: ICD-10-CM

## 2020-07-21 DIAGNOSIS — E11.69 TYPE 2 DIABETES MELLITUS WITH OTHER SPECIFIED COMPLICATION: ICD-10-CM

## 2020-07-21 DIAGNOSIS — Z91.14 PATIENT'S OTHER NONCOMPLIANCE WITH MEDICATION REGIMEN: ICD-10-CM

## 2020-07-21 PROCEDURE — 99215 OFFICE O/P EST HI 40 MIN: CPT

## 2020-07-22 PROBLEM — Z91.14 HISTORY OF MEDICATION NONCOMPLIANCE: Status: ACTIVE | Noted: 2020-07-22

## 2020-07-22 PROBLEM — E11.69 TYPE 2 DIABETES MELLITUS WITH OTHER SPECIFIED COMPLICATION, WITH LONG-TERM CURRENT USE OF INSULIN: Status: ACTIVE | Noted: 2020-07-22

## 2020-07-22 PROBLEM — Z91.89 AT RISK FOR MEDICATION NONCOMPLIANCE: Status: ACTIVE | Noted: 2020-07-22

## 2020-08-11 ENCOUNTER — APPOINTMENT (OUTPATIENT)
Dept: PSYCHIATRY | Facility: CLINIC | Age: 30
End: 2020-08-11
Payer: MEDICARE

## 2020-08-11 PROCEDURE — 99213 OFFICE O/P EST LOW 20 MIN: CPT

## 2020-10-07 NOTE — ED ADULT NURSE NOTE - PAIN RATING/NUMBER SCALE (0-10): ACTIVITY
0 Keystone Flap Text: The defect edges were debeveled with a #15 scalpel blade.  Given the location of the defect, shape of the defect a keystone flap was deemed most appropriate.  Using a sterile surgical marker, an appropriate keystone flap was drawn incorporating the defect, outlining the appropriate donor tissue and placing the expected incisions within the relaxed skin tension lines where possible. The area thus outlined was incised deep to adipose tissue with a #15 scalpel blade.  The skin margins were undermined to an appropriate distance in all directions around the primary defect and laterally outward around the flap utilizing iris scissors.

## 2020-10-13 NOTE — CONSULT NOTE ADULT - ATTENDING COMMENTS
Agree with plan as outlined above. Challenging social situation. Will need to be dc on basal bolus insulin. No

## 2020-10-28 ENCOUNTER — APPOINTMENT (OUTPATIENT)
Dept: PSYCHIATRY | Facility: CLINIC | Age: 30
End: 2020-10-28
Payer: MEDICARE

## 2020-10-28 PROCEDURE — 99214 OFFICE O/P EST MOD 30 MIN: CPT

## 2020-11-10 ENCOUNTER — APPOINTMENT (OUTPATIENT)
Dept: PSYCHIATRY | Facility: CLINIC | Age: 30
End: 2020-11-10

## 2020-12-14 ENCOUNTER — RX RENEWAL (OUTPATIENT)
Age: 30
End: 2020-12-14

## 2021-01-14 ENCOUNTER — APPOINTMENT (OUTPATIENT)
Dept: PSYCHIATRY | Facility: CLINIC | Age: 31
End: 2021-01-14

## 2021-01-26 ENCOUNTER — APPOINTMENT (OUTPATIENT)
Dept: PSYCHIATRY | Facility: CLINIC | Age: 31
End: 2021-01-26
Payer: MEDICARE

## 2021-01-26 DIAGNOSIS — F31.9 BIPOLAR DISORDER, UNSPECIFIED: ICD-10-CM

## 2021-01-26 PROCEDURE — 99214 OFFICE O/P EST MOD 30 MIN: CPT

## 2021-04-20 ENCOUNTER — APPOINTMENT (OUTPATIENT)
Dept: PSYCHIATRY | Facility: CLINIC | Age: 31
End: 2021-04-20

## 2021-05-13 ENCOUNTER — APPOINTMENT (OUTPATIENT)
Dept: PSYCHIATRY | Facility: CLINIC | Age: 31
End: 2021-05-13

## 2021-05-26 ENCOUNTER — APPOINTMENT (OUTPATIENT)
Dept: PSYCHIATRY | Facility: CLINIC | Age: 31
End: 2021-05-26
Payer: MEDICARE

## 2021-05-26 DIAGNOSIS — F25.0 SCHIZOAFFECTIVE DISORDER, BIPOLAR TYPE: ICD-10-CM

## 2021-05-26 DIAGNOSIS — F79 UNSPECIFIED INTELLECTUAL DISABILITIES: ICD-10-CM

## 2021-05-26 PROCEDURE — 99214 OFFICE O/P EST MOD 30 MIN: CPT

## 2021-05-26 RX ORDER — HALOPERIDOL 20 MG/1
20 TABLET ORAL
Qty: 90 | Refills: 0 | Status: ACTIVE | COMMUNITY
Start: 2020-09-11 | End: 1900-01-01

## 2021-05-26 RX ORDER — BENZTROPINE MESYLATE 1 MG/1
1 TABLET ORAL
Qty: 180 | Refills: 0 | Status: ACTIVE | COMMUNITY
Start: 2020-09-11 | End: 1900-01-01

## 2021-05-26 RX ORDER — DIVALPROEX SODIUM 250 MG/1
250 TABLET, EXTENDED RELEASE ORAL
Qty: 630 | Refills: 0 | Status: ACTIVE | COMMUNITY
Start: 2020-09-11 | End: 1900-01-01

## 2021-05-27 PROBLEM — F79 INTELLECTUAL DISABILITY: Status: ACTIVE | Noted: 2020-07-22

## 2021-05-27 PROBLEM — F25.0 SCHIZOAFFECTIVE DISORDER, BIPOLAR TYPE: Status: ACTIVE | Noted: 2020-07-22

## 2021-06-28 NOTE — ED PROVIDER NOTE - NSCAREINITIATED _GEN_ER
Detail Level: Simple Render Risk Assessment In Note?: yes Cabot, Jennifer(Attending) Patient Management Risk Assessment: Low Additional Notes: Patient was screened for COVID prior to being able to enter clinic for medical visit:\\n1. Temperature taken and documented\\n2. Questions asked:\\n*Recently traveled outside the community in the last 14 days?\\n*Do you have a cough, fever or shortness of breath?\\n*Any noted changes in sense of smell and taste?\\n*Been in close proximity to someone that has tested positive for COVID in the last 3 weeks?\\n*Have you had a positive COVID test?\\n3. They are required to wear a mask at all times in the clinic and wash hands with  or wear gloves throughout\\nthe visit.\\nResults are negative unless a positive finding is recorded. Any positive screening will be noted - and patient will be\\nnot be seen in the clinic today however they will be advised to go home and contact their primary care provider.

## 2021-08-06 NOTE — ED ADULT NURSE NOTE - DOES PATIENT HAVE ADVANCE DIRECTIVE
Stop potassium supplements for now. Obtain blood work and office will call with results. Will reassess potassium supplementation after lab work results.   
No

## 2021-08-25 ENCOUNTER — APPOINTMENT (OUTPATIENT)
Dept: PSYCHIATRY | Facility: CLINIC | Age: 31
End: 2021-08-25
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
## 2022-03-23 NOTE — ED ADULT NURSE NOTE - CHIEF COMPLAINT QUOTE
pt MR, c/o Chest pain. given asa and 2 nitro sprays by EMS. pt arrives with no family. on 2L O2 by EMS.  f/s 431
Nursing

## 2022-05-23 NOTE — PROGRESS NOTE ADULT - ASSESSMENT
28 year old man with schizophrenia, DM 2 uncontrolled, admitted with DKA. HbA1c 13.8%, also found to have NSTEMI, medically managed.  pt and family unable to give insulin at home, dc planning ongoing with plan for group home. Yes

## 2022-09-28 NOTE — ED PROVIDER NOTE - CADM POA URETHRAL CATHETER
Patient presents to the device clinic today for a programming evaluation for her defibrillator. Patient has a history of stroke, AF, A Flutter, DCM, and VT. Takes Eliquis and Toprol XL. Last device interrogation was on 8/1. Since then, no arrhythmias recorded. TI near baseline. P wave: 4.4 mV  R wave: 13.6 mV    AP 21.3%  96.8%    All sensing and pacing parameters are within normal range. No changes need to be made at this time. Patient will see MALENA Littlejohn in office today. Patient education was provided about device functionality, in home monitoring, and any other patient questions and/or concerns were addressed. Patient voices understanding. Patient will follow up in 3 months in office or remotely. Please see interrogation for more detail - Paceart report located under the Cardiology tab.
No

## 2022-11-15 NOTE — ED PROVIDER NOTE - PATIENT PORTAL LINK FT
ESTABLISHED PRIMARY CARE VISIT    11/15/22  Name: Gucci Parker   : 1948   Age: 69 y.o. Sex: female        Assessment & Plan:     Problem List Items Addressed This Visit          Circulatory    Essential hypertension - Primary     Chronic, controlled  Continue lisinopril-HCTZ 20-12.5 mg daily         Relevant Medications    lisinopril-hydroCHLOROthiazide (PRINZIDE;ZESTORETIC) 20-12.5 MG per tablet       Musculoskeletal and Integument    Primary osteoarthritis involving multiple joints     Stopped naproxen due to CKD 3B  Continue topical diclofenac         Relevant Medications    diclofenac sodium (VOLTAREN) 1 % GEL       Genitourinary    Chronic kidney disease, stage 3b (HCC)     Previously stage 3a  Recheck kidney function  Consider nephro referral, declined previously         Relevant Orders    Basic Metabolic Panel (Completed)       Other    Fatigue     Counseled again on lifestyle changes         Relevant Medications    Cyanocobalamin (B-12) 1000 MCG TABS    B12 deficiency     Recheck  Continue supplement         Relevant Medications    Cyanocobalamin (B-12) 1000 MCG TABS    Other Relevant Orders    Vitamin B12 & Folate (Completed)    Low serum thyroid stimulating hormone (TSH)     Recheck  Previously normal free T4 and T3         Relevant Orders    TSH (Completed)    Mixed hyperlipidemia     LDL elevated  10-year ASCVD risk score 17.4%  Increase atorvastatin 40 mg daily  Recheck lipids and liver function next visit         Relevant Medications    atorvastatin (LIPITOR) 40 MG tablet    lisinopril-hydroCHLOROthiazide (PRINZIDE;ZESTORETIC) 20-12.5 MG per tablet     Other Visit Diagnoses       Other specified disorders of thyroid         Relevant Orders    TSH (Completed)          Counseled patient regarding above diagnosis, including possible risks and complications, especially if left uncontrolled.   Counseled patient as appropriate and relevant regarding any possible side effects, risks, and alternatives to treatment; the patient verbalizes understanding, and is in agreement with the plan as detailed above. All educational materials and instructions were discussed and included on the After Visit Summary. All questions answered to the patient's satisfaction. The patient was advised to call for any concerns prior to next appointment. Return in about 6 months (around 5/15/2023) for follow-up. Subjective:     Chief Complaint   Patient presents with    3 Month Follow-Up     For kidney and thyroid      Patient returns for follow-up  Still tired  Sleeping 12 hours daily out of boredom  Topical diclofenac helps some    Review of Systems   Constitutional:  Positive for fatigue (improved). Negative for chills, diaphoresis and fever. Eyes:  Negative for visual disturbance. Respiratory:  Negative for cough and shortness of breath. Cardiovascular:  Negative for chest pain, palpitations and leg swelling. Gastrointestinal:  Positive for diarrhea (chronic, improved). Negative for constipation. Genitourinary:  Negative for difficulty urinating and dysuria. Musculoskeletal:  Positive for arthralgias (right shoulder pain). Skin:  Negative for rash. Neurological:  Negative for weakness, light-headedness, numbness and headaches. Hematological:  Negative for adenopathy. Does not bruise/bleed easily. Psychiatric/Behavioral:  Negative for dysphoric mood and sleep disturbance. The patient is not nervous/anxious.       Medications:     Current Outpatient Medications:     atorvastatin (LIPITOR) 20 MG tablet, Take 1 tablet by mouth daily, Disp: 90 tablet, Rfl: 1    lisinopril-hydroCHLOROthiazide (PRINZIDE;ZESTORETIC) 20-12.5 MG per tablet, Take 1 tablet by mouth daily, Disp: 90 tablet, Rfl: 1    diclofenac sodium (VOLTAREN) 1 % GEL, Apply 4 g topically 4 times daily as needed for Pain, Disp: 350 g, Rfl: 1    Cyanocobalamin (B-12) 1000 MCG TABS, Take 1 tablet by mouth daily, Disp: 90 tablet, Rfl: 1 aspirin 81 MG chewable tablet, Take 81 mg by mouth daily, Disp: , Rfl:     Multiple Vitamin (MULTIVITAMIN ADULT PO), Take by mouth (Patient not taking: No sig reported), Disp: , Rfl:     Physical Exam:     Vitals:    11/15/22 1001   BP: 130/84   Site: Right Upper Arm   Position: Sitting   Cuff Size: Large Adult   Pulse: 68   Temp: 97.5 °F (36.4 °C)   SpO2: 98%   Weight: 165 lb (74.8 kg)   Height: 5' 1\" (1.549 m)     BP Readings from Last 3 Encounters:   11/15/22 130/84   06/09/22 116/80   06/09/22 116/80     Wt Readings from Last 3 Encounters:   11/15/22 165 lb (74.8 kg)   06/09/22 159 lb 12.8 oz (72.5 kg)   06/09/22 159 lb 12.8 oz (72.5 kg)     Physical Exam  Vitals and nursing note reviewed. Constitutional:       General: She is not in acute distress. Appearance: Normal appearance. She is obese. She is not ill-appearing or diaphoretic. Cardiovascular:      Rate and Rhythm: Normal rate and regular rhythm. Heart sounds: Normal heart sounds. Pulmonary:      Effort: Pulmonary effort is normal. No respiratory distress. Breath sounds: Normal breath sounds. Musculoskeletal:      Right lower leg: No edema. Left lower leg: No edema. Skin:     General: Skin is warm and dry. Neurological:      Mental Status: She is alert and oriented to person, place, and time. Psychiatric:         Mood and Affect: Mood normal.         Behavior: Behavior normal.     Testing:     Orders Placed This Encounter   Procedures    TSH     Standing Status:   Future     Number of Occurrences:   1     Standing Expiration Date:   87/87/5144    Basic Metabolic Panel     Standing Status:   Future     Number of Occurrences:   1     Standing Expiration Date:   11/15/2023    Vitamin B12 & Folate     Standing Status:   Future     Number of Occurrences:   1     Standing Expiration Date:   11/15/2023      No results found for this or any previous visit (from the past 24 hour(s)). You can access the FollowMyHealth Patient Portal offered by [facility] by registering at the following website: [email or web address]. By joining [employer]’s FollowMyHealth portal, you will also be able to view your health information using other applications (apps) compatible with our system.

## 2023-05-16 NOTE — PROGRESS NOTE ADULT - PROBLEM SELECTOR PLAN 2
Encouraged low fat diet and agree with statin
Encouraged low fat diet and agree with statin
Increase lipitor to 40mg
Lipitor 40mg
calm ,coop
calm ,coop
Encouraged low fat diet and agree with statin
calm ,coop
Satisfactory

## 2023-05-30 NOTE — ED ADULT NURSE NOTE - CAS TRG GEN SKIN COLOR
Normal for race Wartpeel Counseling:  I discussed with the patient the risks of Wartpeel including but not limited to erythema, scaling, itching, weeping, crusting, and pain.
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
## 2023-08-18 NOTE — PROGRESS NOTE ADULT - ASSESSMENT
28 year old man history of uncontrolled T2DM on oral Metformin, intellectual disability, bipolar d/o, schizophrenia presents to the ED on 12/22 with complaints of chest pain with Type II NSTEMI w/o indication for cath.  Also presented with NBNB; found to be with elevated glucose with concern for mild DKA; now resolved. 28 year old man history of uncontrolled T2DM on oral Metformin, intellectual disability, bipolar vs schizophrenia presents to the ED on 12/22 with complaints of chest pain with Type II NSTEMI w/o indication for cath.  Also presented with NBNB; found to be with elevated glucose with concern for mild DKA; now resolved. normal

## 2024-02-14 NOTE — ED ADULT NURSE NOTE - NS ED NURSE DISCH DISPOSITION
Lvm to call and ask if wants to schedule office visit w/Jeff before 3/1 [facility] colonoscopy per Elsie's message.  
Discharged

## 2024-08-13 NOTE — PATIENT PROFILE ADULT - NSPROPTRIGHTSUPPORTPERSON_GEN_A_NUR
From: Jase Marshall  To: MARIAA Solares  Sent: 8/13/2024 12:59 PM CDT  Subject: Physical form    Hello, I leave for college on the 26th of this month. I was previously a patient of Dr Swenson who no longer practices there. I plan to participate in athletics at [employer], but I need my physical form completed. Would you please be able complete the attached form? I have an appointment to see you today at 3pm   information could not be obtained
